# Patient Record
Sex: MALE | Race: WHITE | Employment: FULL TIME | URBAN - NONMETROPOLITAN AREA
[De-identification: names, ages, dates, MRNs, and addresses within clinical notes are randomized per-mention and may not be internally consistent; named-entity substitution may affect disease eponyms.]

---

## 2019-05-09 ENCOUNTER — OFFICE VISIT (OUTPATIENT)
Dept: FAMILY MEDICINE CLINIC | Age: 49
End: 2019-05-09

## 2019-05-09 VITALS
TEMPERATURE: 98.5 F | DIASTOLIC BLOOD PRESSURE: 100 MMHG | OXYGEN SATURATION: 95 % | SYSTOLIC BLOOD PRESSURE: 170 MMHG | HEIGHT: 71 IN | BODY MASS INDEX: 36.68 KG/M2 | WEIGHT: 262 LBS | HEART RATE: 105 BPM | RESPIRATION RATE: 16 BRPM

## 2019-05-09 DIAGNOSIS — L03.011 CELLULITIS OF RIGHT INDEX FINGER: ICD-10-CM

## 2019-05-09 DIAGNOSIS — T14.8XXA BLOOD BLISTER: Primary | ICD-10-CM

## 2019-05-09 PROCEDURE — 99212 OFFICE O/P EST SF 10 MIN: CPT | Performed by: NURSE PRACTITIONER

## 2019-05-09 RX ORDER — SULFAMETHOXAZOLE AND TRIMETHOPRIM 800; 160 MG/1; MG/1
1 TABLET ORAL 2 TIMES DAILY
Qty: 14 TABLET | Refills: 0 | Status: SHIPPED | OUTPATIENT
Start: 2019-05-09 | End: 2019-05-09 | Stop reason: CLARIF

## 2019-05-09 RX ORDER — CLINDAMYCIN HYDROCHLORIDE 300 MG/1
300 CAPSULE ORAL 3 TIMES DAILY
Qty: 30 CAPSULE | Refills: 0 | Status: SHIPPED | OUTPATIENT
Start: 2019-05-09 | End: 2019-05-19

## 2019-05-09 ASSESSMENT — PATIENT HEALTH QUESTIONNAIRE - PHQ9
SUM OF ALL RESPONSES TO PHQ QUESTIONS 1-9: 0
SUM OF ALL RESPONSES TO PHQ9 QUESTIONS 1 & 2: 0
1. LITTLE INTEREST OR PLEASURE IN DOING THINGS: 0
SUM OF ALL RESPONSES TO PHQ QUESTIONS 1-9: 0
2. FEELING DOWN, DEPRESSED OR HOPELESS: 0

## 2019-05-09 NOTE — PROGRESS NOTES
Subjective  Wilfred Weiss, 51 y.o. male presents today with:  Chief Complaint   Patient presents with    Finger Injury     infection on left pointer finger started off as an ingrown nail and oww has a bubble around finger . HPI   Patient is here index finger on right hand. It is full of fluid, pain and can feel heartbeat. Started off as an ingrown nail, got full of fluid and turning red over the past week. Patient has no systemic symptoms at this time. Has not done anything to treat finger. A little fluid came out, but it closed up again. Review of Systems   Constitutional: Negative for activity change, appetite change, chills, diaphoresis and fever. Objective    Vitals:    05/09/19 1427   BP: (!) 170/100   Site: Left Upper Arm   Position: Sitting   Cuff Size: Large Adult   Pulse: 105   Resp: 16   Temp: 98.5 °F (36.9 °C)   TempSrc: Tympanic   SpO2: 95%   Weight: 262 lb (118.8 kg)   Height: 5' 10.5\" (1.791 m)       Physical Exam   Constitutional: He appears well-developed and well-nourished. No distress. HENT:   Right Ear: External ear normal.   Left Ear: External ear normal.   Mouth/Throat: Oropharynx is clear and moist.   Eyes: Conjunctivae are normal. Right eye exhibits no discharge. Left eye exhibits no discharge. Cardiovascular: Normal rate, regular rhythm and normal heart sounds. Pulmonary/Chest: Effort normal and breath sounds normal.   Musculoskeletal: He exhibits no edema. Lymphadenopathy:     He has no cervical adenopathy. Skin: No rash noted. He is not diaphoretic. No erythema. No pallor. Right index finger from DIP joint to tip filled with dark brownish, malodorous fluid on palmar side. Erythema and warmth to dorsal side of hand. Vitals reviewed. POC Testing Today:No results found for this visit on 05/09/19. Assessment & Plan    Diagnosis Orders   1.  Blood blister  DISCONTINUED: sulfamethoxazole-trimethoprim (BACTRIM DS;SEPTRA DS) 800-160 MG per tablet   2. Cellulitis of right index finger  clindamycin (CLEOCIN) 300 MG capsule    DISCONTINUED: sulfamethoxazole-trimethoprim (BACTRIM DS;SEPTRA DS) 800-160 MG per tablet       No orders of the defined types were placed in this encounter. Finger cleansed with iodine, sprayed with pain ease , then puncture with sterile 18g needle. Pressure applied, large amount of malodorous drainage expressed approx 3cc. Nonadherent gauze applied and finger wrapped in Keflex. Recommended keeping finger open to air unless exposed to possible sources of infection. Patient verbalized understanding. Antibiotic Instructions:   Complete the full course of antibiotics as ordered. To prevent antibiotic resistances please take medication as ordered and for the full duration even if you start to feel better. Take each dose with a small snack or meal to lessen potential GI upset. Consider intake of yogurt or probiotic during antibiotic use and for a few days after to help reduce the risk of developing a secondary infection. Take the yogurt or probiotic at least 2 hours after taking the antibiotic. Avoid alcohol while taking antibiotics. Return if symptoms worsen or fail to improve, for follow up with your PCP. Side effects and adverse effects of any medication prescribed today, as well as treatment plan/rationale, follow-up care, and result expectations have been discussed with the patient. Expresses understanding and desires to proceed with treatment plan. Discussed signs and symptoms which require immediate follow-up in ED/call to 911. Understanding verbalized. I have reviewed and updated the electronic medical record.     Chikis Daniel, APRN - CNP

## 2021-05-11 LAB
ALBUMIN SERPL-MCNC: 4.2 G/DL
ALP BLD-CCNC: 67 U/L
ALT SERPL-CCNC: 23 U/L
ANION GAP SERPL CALCULATED.3IONS-SCNC: 13 MMOL/L
AST SERPL-CCNC: 14 U/L
AVERAGE GLUCOSE: NORMAL
BILIRUB SERPL-MCNC: 0.3 MG/DL (ref 0.1–1.4)
BUN BLDV-MCNC: 22 MG/DL
CALCIUM SERPL-MCNC: 23 MG/DL
CHLORIDE BLD-SCNC: 98 MMOL/L
CHOLESTEROL, TOTAL: 176 MG/DL
CHOLESTEROL/HDL RATIO: NORMAL
CO2: 24 MMOL/L
CREAT SERPL-MCNC: NORMAL MG/DL
GFR CALCULATED: >60
GLUCOSE BLD-MCNC: 318 MG/DL
HBA1C MFR BLD: 11.8 %
HDLC SERPL-MCNC: 42 MG/DL (ref 35–70)
LDL CHOLESTEROL CALCULATED: 98 MG/DL (ref 0–160)
NONHDLC SERPL-MCNC: NORMAL MG/DL
POTASSIUM SERPL-SCNC: 4.7 MMOL/L
SODIUM BLD-SCNC: 135 MMOL/L
TOTAL PROTEIN: 7.1
TRIGL SERPL-MCNC: 182 MG/DL
TSH SERPL DL<=0.05 MIU/L-ACNC: 1.34 UIU/ML
VLDLC SERPL CALC-MCNC: NORMAL MG/DL

## 2021-07-07 ENCOUNTER — OFFICE VISIT (OUTPATIENT)
Dept: FAMILY MEDICINE CLINIC | Age: 51
End: 2021-07-07

## 2021-07-07 VITALS
HEIGHT: 69 IN | WEIGHT: 242 LBS | HEART RATE: 98 BPM | BODY MASS INDEX: 35.84 KG/M2 | DIASTOLIC BLOOD PRESSURE: 80 MMHG | OXYGEN SATURATION: 98 % | SYSTOLIC BLOOD PRESSURE: 160 MMHG

## 2021-07-07 DIAGNOSIS — E11.42 TYPE 2 DIABETES MELLITUS WITH DIABETIC POLYNEUROPATHY, WITHOUT LONG-TERM CURRENT USE OF INSULIN (HCC): Primary | ICD-10-CM

## 2021-07-07 DIAGNOSIS — I10 ESSENTIAL HYPERTENSION: ICD-10-CM

## 2021-07-07 PROCEDURE — 99203 OFFICE O/P NEW LOW 30 MIN: CPT | Performed by: NURSE PRACTITIONER

## 2021-07-07 RX ORDER — PRAVASTATIN SODIUM 20 MG
20 TABLET ORAL DAILY
Qty: 30 TABLET | Refills: 5 | Status: SHIPPED | OUTPATIENT
Start: 2021-07-07 | End: 2021-08-02 | Stop reason: SDUPTHER

## 2021-07-07 RX ORDER — LISINOPRIL 10 MG/1
10 TABLET ORAL DAILY
Qty: 30 TABLET | Refills: 5 | Status: SHIPPED | OUTPATIENT
Start: 2021-07-07 | End: 2021-08-02 | Stop reason: SDUPTHER

## 2021-07-07 SDOH — ECONOMIC STABILITY: TRANSPORTATION INSECURITY
IN THE PAST 12 MONTHS, HAS THE LACK OF TRANSPORTATION KEPT YOU FROM MEDICAL APPOINTMENTS OR FROM GETTING MEDICATIONS?: NO

## 2021-07-07 SDOH — ECONOMIC STABILITY: FOOD INSECURITY: WITHIN THE PAST 12 MONTHS, YOU WORRIED THAT YOUR FOOD WOULD RUN OUT BEFORE YOU GOT MONEY TO BUY MORE.: NEVER TRUE

## 2021-07-07 SDOH — ECONOMIC STABILITY: TRANSPORTATION INSECURITY
IN THE PAST 12 MONTHS, HAS LACK OF TRANSPORTATION KEPT YOU FROM MEETINGS, WORK, OR FROM GETTING THINGS NEEDED FOR DAILY LIVING?: NO

## 2021-07-07 SDOH — ECONOMIC STABILITY: FOOD INSECURITY: WITHIN THE PAST 12 MONTHS, THE FOOD YOU BOUGHT JUST DIDN'T LAST AND YOU DIDN'T HAVE MONEY TO GET MORE.: NEVER TRUE

## 2021-07-07 ASSESSMENT — ENCOUNTER SYMPTOMS
SHORTNESS OF BREATH: 0
DIARRHEA: 0
COUGH: 0
CONSTIPATION: 0

## 2021-07-07 ASSESSMENT — PATIENT HEALTH QUESTIONNAIRE - PHQ9
1. LITTLE INTEREST OR PLEASURE IN DOING THINGS: 0
2. FEELING DOWN, DEPRESSED OR HOPELESS: 0
SUM OF ALL RESPONSES TO PHQ QUESTIONS 1-9: 0
SUM OF ALL RESPONSES TO PHQ9 QUESTIONS 1 & 2: 0

## 2021-07-07 ASSESSMENT — SOCIAL DETERMINANTS OF HEALTH (SDOH): HOW HARD IS IT FOR YOU TO PAY FOR THE VERY BASICS LIKE FOOD, HOUSING, MEDICAL CARE, AND HEATING?: NOT HARD AT ALL

## 2021-07-07 NOTE — PROGRESS NOTES
Subjective  Chief Complaint   Patient presents with   [facility] Maintenance     pt declined colonoscopy     Established New Doctor     pt states sugar has been running high x 3 months, has been exercising and dieting. HPI     Diabetes- has been trying to control with diet and exercise. Has lost some weight since watching diet. First diagnosis in 2015. Lost insurance. Was on medication for short period of time. Peripheral neuropathy present. No vision. No polydipsia, polyphagia. States that he is ready to \"get this under control\"    Bp has been running 140's-150's/80's. Denies any symptoms     Came in with [facility] blood work done in May. No other current complaints    There are no problems to display for this patient. Past Medical History:   Diagnosis Date    Hypertension     Type 2 diabetes mellitus ([address]      No past surgical history on file.   Family History   Problem Relation Age of Onset    Rheum Arthritis Mother     Osteoarthritis Mother     Hypertension Mother     Cancer Mother         uterine cancer    Other Father         myocardial infarction    Diabetes Father      Social History     Socioeconomic History    Marital status: Single     Spouse name: None    Number of children: None    Years of education: None    Highest education level: None   Occupational History    None   Tobacco Use    Smoking status: Never Smoker    Smokeless tobacco: Never Used   Substance and Sexual Activity    Alcohol use: No     Alcohol/week: 0.0 standard drinks    Drug use: No    Sexual activity: None   Other Topics Concern    None   Social History Narrative    None     Social Determinants of Health     Financial Resource Strain: Low Risk     Difficulty of Paying Living Expenses: Not hard at all   Food Insecurity: No Food Insecurity    Worried About Running Out of Food in the Last Year: Never true    Tai of Food in the Last Year: Never true   Transportation Needs: No regular rhythm. Pulses: Normal pulses. Heart sounds: Normal heart sounds. Pulmonary:      Effort: Pulmonary effort is normal.      Breath sounds: Normal breath sounds. Skin:     General: Skin is warm. Neurological:      General: No focal deficit present. Mental Status: He is alert and oriented to person, place, and time. Mental status is at baseline. Psychiatric:         Mood and Affect: Mood normal.         Behavior: Behavior normal.         Thought Content: Thought content normal.         Judgment: Judgment normal.         Assessment & Plan     Diagnosis Orders   1. Type 2 diabetes mellitus with diabetic polyneuropathy, without long-term current use of insulin (HCC)  metFORMIN (GLUCOPHAGE) 500 MG tablet    pravastatin (PRAVACHOL) 20 MG tablet    lisinopril (PRINIVIL;ZESTRIL) 10 MG tablet   2. Essential hypertension  lisinopril (PRINIVIL;ZESTRIL) 10 MG tablet       No orders of the defined types were placed in this encounter. Orders Placed This Encounter   Medications    metFORMIN (GLUCOPHAGE) 500 MG tablet     Sig: Take 2 tablets by mouth 2 times daily (with meals)     Dispense:  120 tablet     Refill:  5    pravastatin (PRAVACHOL) 20 MG tablet     Sig: Take 1 tablet by mouth daily     Dispense:  30 tablet     Refill:  5    lisinopril (PRINIVIL;ZESTRIL) 10 MG tablet     Sig: Take 1 tablet by mouth daily     Dispense:  30 tablet     Refill:  5     Side effects, adverse effects of the medication prescribed today, as well as treatment plan/ rationale and result expectations have been discussed with the patient who expresses understanding and desires to proceed. Close follow up to evaluate treatment results and for coordination of care. I have reviewed the patient's medical history in detail and updated the computerized patient record. As always, patient is advised that if symptoms worsen in any way they will proceed to the nearest emergency room.      Diabetes education provided today:    Diabetic Neuropathy: signs and therapy. Diabetic retinopathy: the most frequent cause of blindness in [place] currently. Measures to prevent that. Diabetic nephropathy: Kidney function, microalbumin as a sign of diabetic nephropathy. Stages of kidney disease. Different diabetic medications. Return in about 4 weeks (around 8/4/2021).     SALTY Mann - CNP

## 2021-08-02 ENCOUNTER — OFFICE VISIT (OUTPATIENT)
Dept: FAMILY MEDICINE CLINIC | Age: 51
End: 2021-08-02

## 2021-08-02 VITALS
DIASTOLIC BLOOD PRESSURE: 70 MMHG | BODY MASS INDEX: 33.08 KG/M2 | SYSTOLIC BLOOD PRESSURE: 140 MMHG | HEART RATE: 94 BPM | WEIGHT: 224 LBS | OXYGEN SATURATION: 97 % | TEMPERATURE: 99 F

## 2021-08-02 DIAGNOSIS — J06.9 VIRAL URI: ICD-10-CM

## 2021-08-02 DIAGNOSIS — I10 ESSENTIAL HYPERTENSION: ICD-10-CM

## 2021-08-02 DIAGNOSIS — J06.9 VIRAL URI: Primary | ICD-10-CM

## 2021-08-02 DIAGNOSIS — E11.42 TYPE 2 DIABETES MELLITUS WITH DIABETIC POLYNEUROPATHY, WITHOUT LONG-TERM CURRENT USE OF INSULIN (HCC): ICD-10-CM

## 2021-08-02 PROCEDURE — 99213 OFFICE O/P EST LOW 20 MIN: CPT

## 2021-08-02 RX ORDER — LISINOPRIL 10 MG/1
10 TABLET ORAL DAILY
Qty: 90 TABLET | Refills: 1 | Status: SHIPPED | OUTPATIENT
Start: 2021-08-02 | End: 2021-08-18

## 2021-08-02 RX ORDER — BENZONATATE 100 MG/1
100-200 CAPSULE ORAL 3 TIMES DAILY PRN
Qty: 42 CAPSULE | Refills: 0 | Status: SHIPPED | OUTPATIENT
Start: 2021-08-02 | End: 2021-08-09

## 2021-08-02 RX ORDER — PRAVASTATIN SODIUM 20 MG
20 TABLET ORAL DAILY
Qty: 90 TABLET | Refills: 1 | Status: ON HOLD | OUTPATIENT
Start: 2021-08-02 | End: 2022-01-05 | Stop reason: HOSPADM

## 2021-08-02 RX ORDER — FLUTICASONE PROPIONATE 50 MCG
2 SPRAY, SUSPENSION (ML) NASAL DAILY
Qty: 1 BOTTLE | Refills: 0 | Status: SHIPPED | OUTPATIENT
Start: 2021-08-02

## 2021-08-02 RX ORDER — LORATADINE 10 MG
2 CAPSULE ORAL 4 TIMES DAILY
Qty: 168 CAPSULE | Refills: 0 | Status: SHIPPED | OUTPATIENT
Start: 2021-08-02 | End: 2021-12-30

## 2021-08-02 ASSESSMENT — ENCOUNTER SYMPTOMS
EYE DISCHARGE: 0
EYE REDNESS: 0
COLOR CHANGE: 0
CHEST TIGHTNESS: 1
COUGH: 1
BACK PAIN: 0
ABDOMINAL PAIN: 0
EYE ITCHING: 0
SHORTNESS OF BREATH: 0
SINUS PAIN: 0
EYE PAIN: 0
FLU SYMPTOMS: 1
NAUSEA: 0
SINUS PRESSURE: 0
SORE THROAT: 0
SWOLLEN GLANDS: 0
RHINORRHEA: 1

## 2021-08-02 NOTE — PATIENT INSTRUCTIONS
Patient Education        Viral Respiratory Infection: Care Instructions  Your Care Instructions     Viruses are very small organisms. They grow in number after they enter your body. There are many types that cause different illnesses, such as colds and the mumps. The symptoms of a viral respiratory infection often start quickly. They include a fever, sore throat, and runny nose. You may also just not feel well. Or you may not want to eat much. Most viral respiratory infections are not serious. They usually get better with time and self-care. Antibiotics are not used to treat a viral infection. That's because antibiotics will not help cure a viral illness. In some cases, antiviral medicine can help your body fight a serious viral infection. Follow-up care is a key part of your treatment and safety. Be sure to make and go to all appointments, and call your doctor if you are having problems. It's also a good idea to know your test results and keep a list of the medicines you take. How can you care for yourself at home? · Rest as much as possible until you feel better. · Be safe with medicines. Take your medicine exactly as prescribed. Call your doctor if you think you are having a problem with your medicine. You will get more details on the specific medicine your doctor prescribes. · Take an over-the-counter pain medicine, such as acetaminophen (Tylenol), ibuprofen (Advil, Motrin), or naproxen (Aleve), as needed for pain and fever. Read and follow all instructions on the label. Do not give aspirin to anyone younger than 20. It has been linked to Reye syndrome, a serious illness. · Drink plenty of fluids. Hot fluids, such as tea or soup, may help relieve congestion in your nose and throat. If you have kidney, heart, or liver disease and have to limit fluids, talk with your doctor before you increase the amount of fluids you drink. · Try to clear mucus from your lungs by breathing deeply and coughing.   · Gargle with warm salt water once an hour. This can help reduce swelling and throat pain. Use 1 teaspoon of salt mixed in 1 cup of warm water. · Do not smoke or allow others to smoke around you. If you need help quitting, talk to your doctor about stop-smoking programs and medicines. These can increase your chances of quitting for good. To avoid spreading the virus  · Cough or sneeze into a tissue. Then throw the tissue away. · If you don't have a tissue, use your hand to cover your cough or sneeze. Then clean your hand. You can also cough into your sleeve. · Wash your hands often. Use soap and warm water. Wash for 15 to 20 seconds each time. · If you don't have soap and water near you, you can clean your hands with alcohol wipes or gel. When should you call for help? Call your doctor now or seek immediate medical care if:    · You have a new or higher fever.     · Your fever lasts more than 48 hours.     · You have trouble breathing.     · You have a fever with a stiff neck or a severe headache.     · You are sensitive to light.     · You feel very sleepy or confused. Watch closely for changes in your health, and be sure to contact your doctor if:    · You do not get better as expected. Where can you learn more? Go to [email or web address] and sign in to your [employer] account. Enter I661 in the [facility] box to learn more about \"Viral Respiratory Infection: Care Instructions. \"     If you do not have an account, please click on the \"Sign Up Now\" link. Current as of: October 26, 2020               Content Version: 12.9  © 5139-0138 [employer]. Care instructions adapted under license by [facility] ([facility]). If you have questions about a medical condition or this instruction, always ask your healthcare professional. [place] [address] any warranty or liability for your use of this information.

## 2021-08-02 NOTE — TELEPHONE ENCOUNTER
Pt calling for refills will be using [facility] no ins      Metformin and lisinopril are for 180 day  Pravastatin 90 day

## 2021-08-02 NOTE — PROGRESS NOTES
[facility] Encounter  CHIEF COMPLAINT       Chief Complaint   Patient presents with    Influenza     chills, some fever, no taste, smell seems to be ok    Nasal Congestion     since last wed    Chest Congestion     non productive cough       HISTORY OF PRESENT ILLNESS   Edna Oliveira is a 45 y.o. male who presents with: Influenza  This is a new problem. Episode onset: Starting wednesday. The problem occurs constantly. The problem has been gradually improving. Associated symptoms include arthralgias, chills, congestion, coughing, fatigue, a fever, headaches and myalgias. Pertinent negatives include no abdominal pain, anorexia, chest pain, diaphoresis, joint swelling, nausea, numbness, rash, sore throat, swollen glands or weakness. Nothing aggravates the symptoms. Treatments tried: mucinex. The treatment provided mild relief. REVIEW OF SYSTEMS     Review of Systems   Constitutional: Positive for chills, fatigue and fever. Negative for diaphoresis. HENT: Positive for congestion and rhinorrhea. Negative for ear discharge, ear pain, postnasal drip, sinus pressure, sinus pain and sore throat. Eyes: Negative for pain, discharge, redness and itching. Respiratory: Positive for cough and chest tightness. Negative for shortness of breath. Cardiovascular: Negative for chest pain and leg swelling. Gastrointestinal: Negative for abdominal pain, anorexia and nausea. Endocrine: Negative for polydipsia, polyphagia and polyuria. Genitourinary: Negative for difficulty urinating, enuresis and flank pain. Musculoskeletal: Positive for arthralgias and myalgias. Negative for back pain, gait problem, joint swelling and neck stiffness. Skin: Negative for color change and rash. Neurological: Positive for light-headedness and headaches. Negative for dizziness, seizures, speech difficulty, weakness and numbness. Psychiatric/Behavioral: Negative.   Negative for agitation. PAST MEDICAL HISTORY         Diagnosis Date    Hypertension     Type 2 diabetes mellitus ([facility] [address]      SURGICAL HISTORY     Patient  has no past surgical history on file. CURRENT MEDICATIONS       Previous Medications    No medications on file     ALLERGIES     Patient is is allergic to penicillins. FAMILY HISTORY     Patient'sfamily history includes Cancer in his mother; Diabetes in his father; Hypertension in his mother; Osteoarthritis in his mother; Other in his father; Rheum Arthritis in his mother. HISTORY     Patient  reports that he has never smoked. He has never used smokeless tobacco. He reports that he does not drink alcohol and does not use drugs. PHYSICAL EXAM     VITALS  BP: (!) 140/70, Temp: 99 °F (37.2 °C), Pulse: 94,  , SpO2: 97 %  Physical Exam  Constitutional:       General: He is not in acute distress. Appearance: Normal appearance. He is ill-appearing. He is not toxic-appearing. HENT:      Head: Normocephalic. Right Ear: Tympanic membrane, ear canal and external ear normal. There is no impacted cerumen. Left Ear: Tympanic membrane, ear canal and external ear normal. There is no impacted cerumen. Nose: Rhinorrhea present. No congestion. Right Turbinates: Not swollen. Left Turbinates: Not swollen. Mouth/Throat:      Mouth: Mucous membranes are moist.      Pharynx: Posterior oropharyngeal erythema present. Tonsils: No tonsillar exudate or tonsillar abscesses. 1+ on the right. 1+ on the left. Eyes:      General:         Right eye: No discharge. Left eye: No discharge. Conjunctiva/sclera: Conjunctivae normal.   Cardiovascular:      Rate and Rhythm: Normal rate and regular rhythm. Pulses: Normal pulses. Heart sounds: Normal heart sounds. No murmur heard. No gallop. Pulmonary:      Effort: Pulmonary effort is normal. No respiratory distress. Breath sounds: Normal breath sounds. No stridor.  No wheezing, rhonchi or rales. Chest:      Chest wall: No tenderness. Abdominal:      General: Abdomen is flat. Palpations: Abdomen is soft. Musculoskeletal:         General: Normal range of motion. Cervical back: Normal range of motion and neck supple. No rigidity or tenderness. Lymphadenopathy:      Cervical: No cervical adenopathy. Skin:     General: Skin is warm and dry. Capillary Refill: Capillary refill takes less than 2 seconds. Coloration: Skin is not pale. Neurological:      Mental Status: He is alert and oriented to person, place, and time. Mental status is at baseline. Psychiatric:         Mood and Affect: Mood normal.       READY CARE COURSE     Orders Placed This Encounter   Procedures    COVID-19     Standing Status:   Future     Standing Expiration Date:   8/2/2022     Scheduling Instructions:      1) Due to current limited availability of the COVID-19 test, tests will be prioritized based on responses to questions above. Testing may be delayed due to volume. 2) Print and instruct patient to adhere to CDC home isolation program. (Link Above)              3) Set up or refer patient for a monitoring program.              4) Have patient sign up for and leverage [employer] (if not previously done). Order Specific Question:   Is this test for diagnosis or screening? Answer:   Diagnosis of ill patient     Order Specific Question:   Symptomatic for COVID-19 as defined by CDC? Answer:   Yes     Order Specific Question:   Date of Symptom Onset     Answer:   7/28/2021     Order Specific Question:   Hospitalized for COVID-19? Answer:   No     Order Specific Question:   Admitted to ICU for COVID-19? Answer:   No     Order Specific Question:   Employed in healthcare setting? Answer:   No     Order Specific Question:   Resident in a congregate (group) care setting? Answer:   No     Order Specific Question:   Pregnant:      Answer:   No     Order Specific Question: Previously tested for COVID-19? Answer:   No        Labs:  No results found for this visit on 08/02/21. IMAGING:  No orders to display     Scheduled Meds:  Continuous Infusions:  PRN Meds:. PROCEDURES:  FINAL IMPRESSION      1. Viral URI        DISPOSITION/PLAN   46 YO male reporting congestion cough starting last Wednesday. Patient has not had COVID vaccination and was out of town last week. Physical exam significant for rhinorrhea enlarged nasal turbinates erythema to pharynx without tonsillar enlargement or exudate. NO cervical adenopathy LS clear. COVID test performed. Pt will be discharged with symptom relief. Coricidin HBP cough and cold and Tessalon capsules. Patient started on Flonase nasal spray daily. Educated to return if symptoms are not improving in 2-4 days or if they worsen. Advised to monitor for respiratory symptoms such as chest pain, SOB or fever that can not be controled. PATIENT REFERRED TO:  Return if symptoms worsen or fail to improve, for Follow up with PCP. DISCHARGE MEDICATIONS:  New Prescriptions    BENZONATATE (TESSALON) 100 MG CAPSULE    Take 1-2 capsules by mouth 3 times daily as needed for Cough    DEXTROMETHORPHAN-GUAIFENESIN (CORICIDIN HBP CONGESTION/COUGH)  MG CAPS    Take 2 tablets by mouth 4 times daily    FLUTICASONE (FLONASE) 50 MCG/ACT NASAL SPRAY    2 sprays by Each Nostril route daily     Cannot display discharge medications since this is not an admission.        Brandon Fuentes, APRN - CNP

## 2021-08-03 LAB — SARS-COV-2, PCR: DETECTED

## 2021-08-18 ENCOUNTER — OFFICE VISIT (OUTPATIENT)
Dept: FAMILY MEDICINE CLINIC | Age: 51
End: 2021-08-18

## 2021-08-18 VITALS
HEART RATE: 96 BPM | DIASTOLIC BLOOD PRESSURE: 86 MMHG | SYSTOLIC BLOOD PRESSURE: 146 MMHG | BODY MASS INDEX: 34.66 KG/M2 | HEIGHT: 69 IN | WEIGHT: 234 LBS | OXYGEN SATURATION: 96 %

## 2021-08-18 DIAGNOSIS — I10 ESSENTIAL HYPERTENSION: Primary | ICD-10-CM

## 2021-08-18 DIAGNOSIS — E11.42 TYPE 2 DIABETES MELLITUS WITH DIABETIC POLYNEUROPATHY, WITHOUT LONG-TERM CURRENT USE OF INSULIN (HCC): ICD-10-CM

## 2021-08-18 PROCEDURE — 99213 OFFICE O/P EST LOW 20 MIN: CPT | Performed by: NURSE PRACTITIONER

## 2021-08-18 RX ORDER — LISINOPRIL 20 MG/1
20 TABLET ORAL DAILY
Qty: 30 TABLET | Refills: 5 | Status: SHIPPED | OUTPATIENT
Start: 2021-08-18 | End: 2021-12-20 | Stop reason: DRUGHIGH

## 2021-08-18 ASSESSMENT — ENCOUNTER SYMPTOMS
DIARRHEA: 0
COUGH: 0
CONSTIPATION: 0
SHORTNESS OF BREATH: 0

## 2021-08-18 NOTE — PROGRESS NOTES
Subjective  Chief Complaint   Patient presents with   [facility] Maintenance     pt declined colonoscopy     Follow-up     diabetes visit. HPI    Had covid since I saw him last. He notes that he was quite sick. HTN- 140's/80's. Taking lisionpril daily. Tolerating it ok. No symptoms of high BP    DM- Has not been able to check his BS on his own. Has noticed that his neuropathy has improved significantly since starting his medications. Otherwise, no current concerns or questions. Overall he is feeling well    There are no problems to display for this patient. Past Medical History:   Diagnosis Date    Hypertension     Type 2 diabetes mellitus ([address]      No past surgical history on file. Family History   Problem Relation Age of Onset    Rheum Arthritis Mother     Osteoarthritis Mother     Hypertension Mother     Cancer Mother         uterine cancer    Other Father         myocardial infarction    Diabetes Father      Social History     Socioeconomic History    Marital status: Single     Spouse name: None    Number of children: None    Years of education: None    Highest education level: None   Occupational History    None   Tobacco Use    Smoking status: Never Smoker    Smokeless tobacco: Never Used   Substance and Sexual Activity    Alcohol use: No     Alcohol/week: 0.0 standard drinks    Drug use: No    Sexual activity: None   Other Topics Concern    None   Social History Narrative    None     Social Determinants of Health     Financial Resource Strain: Low Risk     Difficulty of Paying Living Expenses: Not hard at all   Food Insecurity: No Food Insecurity    Worried About Running Out of Food in the Last Year: Never true    Tai of Food in the Last Year: Never true   Transportation Needs: No Transportation Needs    Lack of Transportation (Medical): No    Lack of Transportation (Non-Medical):  No   Physical Activity:     Days of Exercise per Week:     Minutes of Exercise per Session:    Stress:     Feeling of Stress :    Social Connections:     Frequency of Communication with Friends and Family:     Frequency of Social Gatherings with Friends and Family:     Attends Islam Services:     Active Member of Clubs or Organizations:     Attends Club or Organization Meetings:     Marital Status:    Intimate Partner Violence:     Fear of Current or Ex-Partner:     Emotionally Abused:     Physically Abused:     Sexually Abused:      Current Outpatient Medications on File Prior to Visit   Medication Sig Dispense Refill    metFORMIN (GLUCOPHAGE) 500 MG tablet Take 2 tablets by mouth 2 times daily (with meals) 360 tablet 1    pravastatin (PRAVACHOL) 20 MG tablet Take 1 tablet by mouth daily 90 tablet 1    Dextromethorphan-guaiFENesin (CORICIDIN HBP CONGESTION/COUGH)  MG CAPS Take 2 tablets by mouth 4 times daily 168 capsule 0    fluticasone (FLONASE) 50 MCG/ACT nasal spray 2 sprays by Each Nostril route daily 1 Bottle 0     No current facility-administered medications on file prior to visit. Allergies   Allergen Reactions    Penicillins Hives and Swelling       Review of Systems   Constitutional: Negative for fatigue. Respiratory: Negative for cough and shortness of breath. Cardiovascular: Negative for chest pain. Gastrointestinal: Negative for constipation and diarrhea. Objective  Vitals:    08/18/21 1251 08/18/21 1254   BP: (!) 152/84 (!) 146/86   Site: Right Upper Arm Left Upper Arm   Position: Sitting Sitting   Cuff Size: Large Adult Large Adult   Pulse: 96    SpO2: 96%    Weight: 234 lb (106.1 kg)    Height: 5' 9\" (1.753 m)      Physical Exam  Vitals and nursing note reviewed. Constitutional:       Appearance: Normal appearance. He is normal weight. HENT:      Head: Normocephalic. Nose: Nose normal.      Mouth/Throat:      Mouth: Mucous membranes are moist.   Eyes:      Extraocular Movements: Extraocular movements intact. Conjunctiva/sclera: Conjunctivae normal.      Pupils: Pupils are equal, round, and reactive to light. Cardiovascular:      Rate and Rhythm: Normal rate and regular rhythm. Pulses: Normal pulses. Heart sounds: Normal heart sounds. Pulmonary:      Effort: Pulmonary effort is normal.      Breath sounds: Normal breath sounds. Skin:     General: Skin is warm. Neurological:      General: No focal deficit present. Mental Status: He is alert and oriented to person, place, and time. Mental status is at baseline. Psychiatric:         Mood and Affect: Mood normal.         Behavior: Behavior normal.         Thought Content: Thought content normal.         Judgment: Judgment normal.       Assessment & Plan     Diagnosis Orders   1. Essential hypertension  lisinopril (PRINIVIL;ZESTRIL) 20 MG tablet   2. Type 2 diabetes mellitus with diabetic polyneuropathy, without long-term current use of insulin (HCC)  Continue on current metformin dose. Try to check BS on his own. No orders of the defined types were placed in this encounter. Orders Placed This Encounter   Medications    lisinopril (PRINIVIL;ZESTRIL) 20 MG tablet     Sig: Take 1 tablet by mouth daily     Dispense:  30 tablet     Refill:  5     Side effects, adverse effects of the medication prescribed today, as well as treatment plan/ rationale and result expectations have been discussed with the patient who expresses understanding and desires to proceed. Close follow up to evaluate treatment results and for coordination of care. I have reviewed the patient's medical history in detail and updated the computerized patient record. As always, patient is advised that if symptoms worsen in any way they will proceed to the nearest emergency room. Fu in October for next A1c.     SALTY Dueñas - ROJELIO

## 2021-10-20 ENCOUNTER — OFFICE VISIT (OUTPATIENT)
Dept: FAMILY MEDICINE CLINIC | Age: 51
End: 2021-10-20

## 2021-10-20 VITALS
DIASTOLIC BLOOD PRESSURE: 88 MMHG | SYSTOLIC BLOOD PRESSURE: 138 MMHG | BODY MASS INDEX: 36.29 KG/M2 | HEIGHT: 69 IN | OXYGEN SATURATION: 97 % | HEART RATE: 88 BPM | WEIGHT: 245 LBS

## 2021-10-20 DIAGNOSIS — E11.42 TYPE 2 DIABETES MELLITUS WITH DIABETIC POLYNEUROPATHY, WITHOUT LONG-TERM CURRENT USE OF INSULIN (HCC): Primary | ICD-10-CM

## 2021-10-20 LAB — HBA1C MFR BLD: 10.1 %

## 2021-10-20 PROCEDURE — 99213 OFFICE O/P EST LOW 20 MIN: CPT | Performed by: NURSE PRACTITIONER

## 2021-10-20 PROCEDURE — 83036 HEMOGLOBIN GLYCOSYLATED A1C: CPT | Performed by: NURSE PRACTITIONER

## 2021-10-20 RX ORDER — GLIPIZIDE 5 MG/1
5 TABLET ORAL 2 TIMES DAILY
Qty: 60 TABLET | Refills: 5 | Status: SHIPPED | OUTPATIENT
Start: 2021-10-20 | End: 2021-12-20

## 2021-10-20 ASSESSMENT — ENCOUNTER SYMPTOMS
COUGH: 0
SHORTNESS OF BREATH: 0

## 2021-10-20 NOTE — PROGRESS NOTES
Subjective  Chief Complaint   Patient presents with    Follow-up     2 MONTH F/U    Health Maintenance     PT DECLINED FLU VACCINE, COLON SCREENING. HPI    Here for diabetes follow up. We had increased the metformin at the last visit. Blood sugar has been trending down. Foot tingling has improved some. Still running consistently in the 250's. No other issues/concerns currently. There are no problems to display for this patient. Past Medical History:   Diagnosis Date    Hypertension     Type 2 diabetes mellitus ([address]      No past surgical history on file. Family History   Problem Relation Age of Onset    Rheum Arthritis Mother     Osteoarthritis Mother     Hypertension Mother     Cancer Mother         uterine cancer    Other Father         myocardial infarction    Diabetes Father      Social History     Socioeconomic History    Marital status: Single     Spouse name: None    Number of children: None    Years of education: None    Highest education level: None   Occupational History    None   Tobacco Use    Smoking status: Never Smoker    Smokeless tobacco: Never Used   Substance and Sexual Activity    Alcohol use: No     Alcohol/week: 0.0 standard drinks    Drug use: No    Sexual activity: None   Other Topics Concern    None   Social History Narrative    None     Social Determinants of Health     Financial Resource Strain: Low Risk     Difficulty of Paying Living Expenses: Not hard at all   Food Insecurity: No Food Insecurity    Worried About Running Out of Food in the Last Year: Never true    Tai of Food in the Last Year: Never true   Transportation Needs: No Transportation Needs    Lack of Transportation (Medical): No    Lack of Transportation (Non-Medical):  No   Physical Activity:     Days of Exercise per Week:     Minutes of Exercise per Session:    Stress:     Feeling of Stress :    Social Connections:     Frequency of Communication with Friends and Family:     Frequency of Social Gatherings with Friends and Family:     Attends [employer] Services:     Active Member of Clubs or Organizations:     Attends Club or Organization Meetings:     Marital Status:    Intimate Partner Violence:     Fear of Current or Ex-Partner:     Emotionally Abused:     Physically Abused:     Sexually Abused:      Current Outpatient Medications on File Prior to Visit   Medication Sig Dispense Refill    lisinopril (PRINIVIL;ZESTRIL) 20 MG tablet Take 1 tablet by mouth daily 30 tablet 5    metFORMIN (GLUCOPHAGE) 500 MG tablet Take 2 tablets by mouth 2 times daily (with meals) 360 tablet 1    pravastatin (PRAVACHOL) 20 MG tablet Take 1 tablet by mouth daily 90 tablet 1    fluticasone (FLONASE) 50 MCG/ACT nasal spray 2 sprays by Each Nostril route daily 1 Bottle 0    Dextromethorphan-guaiFENesin (CORICIDIN HBP CONGESTION/COUGH)  MG CAPS Take 2 tablets by mouth 4 times daily (Patient not taking: Reported on 10/20/2021) 168 capsule 0     No current facility-administered medications on file prior to visit. Allergies   Allergen Reactions    Penicillins Hives and Swelling       Review of Systems   Constitutional: Negative for fatigue. Respiratory: Negative for cough and shortness of breath. Cardiovascular: Negative for chest pain. Objective  Vitals:    10/20/21 1249   BP: 138/88   Pulse: 88   SpO2: 97%   Weight: 245 lb (111.1 kg)   Height: 5' 9\" (1.753 m)     Physical Exam  Vitals and nursing note reviewed. Constitutional:       Appearance: Normal appearance. HENT:      Head: Normocephalic. Nose: Nose normal.      Mouth/Throat:      Mouth: Mucous membranes are moist.      Pharynx: Oropharynx is clear. Cardiovascular:      Rate and Rhythm: Normal rate and regular rhythm. Pulses: Normal pulses. Heart sounds: Normal heart sounds. Pulmonary:      Effort: Pulmonary effort is normal.      Breath sounds: Normal breath sounds.    Skin: General: Skin is warm. Neurological:      General: No focal deficit present. Mental Status: He is alert and oriented to person, place, and time. Mental status is at baseline. Psychiatric:         Mood and Affect: Mood normal.         Behavior: Behavior normal.         Thought Content: Thought content normal.         Judgment: Judgment normal.         Assessment & Plan     Diagnosis Orders   1. Type 2 diabetes mellitus with diabetic polyneuropathy, without long-term current use of insulin (HCC)  Hemoglobin A1C    glipiZIDE (GLUCOTROL) 5 MG tablet    POCT glycosylated hemoglobin (Hb A1C)       Orders Placed This Encounter   Procedures    Hemoglobin A1C     Standing Status:   Future     Standing Expiration Date:   10/20/2022    POCT glycosylated hemoglobin (Hb A1C)       Orders Placed This Encounter   Medications    glipiZIDE (GLUCOTROL) 5 MG tablet     Sig: Take 1 tablet by mouth 2 times daily     Dispense:  60 tablet     Refill:  5     Side effects, adverse effects of the medication prescribed today, as well as treatment plan/ rationale and result expectations have been discussed with the patient who expresses understanding and desires to proceed. Close follow up to evaluate treatment results and for coordination of care. I have reviewed the patient's medical history in detail and updated the computerized patient record. As always, patient is advised that if symptoms worsen in any way they will proceed to the nearest emergency room. Return in about 2 months (around 12/20/2021).     SALTY Ozuna - CNP

## 2021-12-20 ENCOUNTER — OFFICE VISIT (OUTPATIENT)
Dept: FAMILY MEDICINE CLINIC | Age: 51
End: 2021-12-20

## 2021-12-20 VITALS
TEMPERATURE: 97.7 F | HEART RATE: 92 BPM | SYSTOLIC BLOOD PRESSURE: 138 MMHG | OXYGEN SATURATION: 95 % | DIASTOLIC BLOOD PRESSURE: 82 MMHG

## 2021-12-20 DIAGNOSIS — E11.42 TYPE 2 DIABETES MELLITUS WITH DIABETIC POLYNEUROPATHY, WITHOUT LONG-TERM CURRENT USE OF INSULIN (HCC): Primary | ICD-10-CM

## 2021-12-20 DIAGNOSIS — I10 ESSENTIAL HYPERTENSION: ICD-10-CM

## 2021-12-20 LAB — HBA1C MFR BLD: 9.1 %

## 2021-12-20 PROCEDURE — 99213 OFFICE O/P EST LOW 20 MIN: CPT | Performed by: NURSE PRACTITIONER

## 2021-12-20 PROCEDURE — 83036 HEMOGLOBIN GLYCOSYLATED A1C: CPT | Performed by: NURSE PRACTITIONER

## 2021-12-20 RX ORDER — GLIPIZIDE 10 MG/1
10 TABLET ORAL 2 TIMES DAILY
Qty: 180 TABLET | Refills: 1 | Status: SHIPPED | OUTPATIENT
Start: 2021-12-20 | End: 2021-12-30 | Stop reason: SDUPTHER

## 2021-12-20 RX ORDER — LISINOPRIL 10 MG/1
20 TABLET ORAL DAILY
COMMUNITY
Start: 2021-11-29 | End: 2021-12-30

## 2021-12-20 ASSESSMENT — ENCOUNTER SYMPTOMS
CONSTIPATION: 0
COUGH: 0
SHORTNESS OF BREATH: 0
DIARRHEA: 0

## 2021-12-20 NOTE — PROGRESS NOTES
Subjective  Chief Complaint   Patient presents with    Follow-up     2 month f/u    [facility] Maintenance     pt declined flu, colon screening        Diabetes  He presents for his follow-up diabetic visit. He has type 2 diabetes mellitus. His disease course has been improving. There are no hypoglycemic associated symptoms. Associated symptoms include foot paresthesias. Pertinent negatives for diabetes include no chest pain and no fatigue. There are no hypoglycemic complications. Symptoms are stable. Diabetic complications include peripheral neuropathy. Risk factors for coronary artery disease include male sex. Current diabetic treatment includes oral agent (dual therapy). He is compliant with treatment all of the time. There is no change in his home blood glucose trend. Lab Results   Component Value Date    CHOL 176 05/11/2021    TRIG 182 05/11/2021    HDL 42 05/11/2021    ALT 23 05/11/2021    AST 14 05/11/2021     05/11/2021    K 4.7 05/11/2021    CL 98 05/11/2021    CREATININE .0.66 05/11/2021    BUN 22 05/11/2021    CO2 24 05/11/2021    TSH 1.340 05/11/2021    LABA1C 9.1 12/20/2021         There are no problems to display for this patient. Past Medical History:   Diagnosis Date    Hypertension     Type 2 diabetes mellitus ([address]      No past surgical history on file.   Family History   Problem Relation Age of Onset    Rheum Arthritis Mother     Osteoarthritis Mother     Hypertension Mother     Cancer Mother         uterine cancer    Other Father         myocardial infarction    Diabetes Father      Social History     Socioeconomic History    Marital status: Single     Spouse name: Not on file    Number of children: Not on file    Years of education: Not on file    Highest education level: Not on file   Occupational History    Not on file   Tobacco Use    Smoking status: Never Smoker    Smokeless tobacco: Never Used   Substance and Sexual Activity    Alcohol use: No     Alcohol/week: 0.0 standard drinks    Drug use: No    Sexual activity: Not on file   Other Topics Concern    Not on file   Social History Narrative    Not on file     Social Determinants of Health     Financial Resource Strain: Low Risk     Difficulty of Paying Living Expenses: Not hard at all   Food Insecurity: No Food Insecurity    Worried About Running Out of Food in the Last Year: Never true    Tai of Food in the Last Year: Never true   Transportation Needs: No Transportation Needs    Lack of Transportation (Medical): No    Lack of Transportation (Non-Medical):  No   Physical Activity:     Days of Exercise per Week: Not on file    Minutes of Exercise per Session: Not on file   Stress:     Feeling of Stress : Not on file   Social Connections:     Frequency of Communication with Friends and Family: Not on file    Frequency of Social Gatherings with Friends and Family: Not on file    Attends Yarsani Services: Not on file    Active Member of [address] [place] [address] or Organizations: Not on file    Attends Club or Organization Meetings: Not on file    Marital Status: Not on file   Intimate Partner Violence:     Fear of Current or Ex-Partner: Not on file    Emotionally Abused: Not on file    Physically Abused: Not on file    Sexually Abused: Not on file   Housing Stability:     Unable to Pay for Housing in the Last Year: Not on file    Number of [place] in the Last Year: Not on file    Unstable Housing in the Last Year: Not on file     Current Outpatient Medications on File Prior to Visit   Medication Sig Dispense Refill    lisinopril (PRINIVIL;ZESTRIL) 10 MG tablet TAKE ONE TABLET BY MOUTH DAILY      metFORMIN (GLUCOPHAGE) 500 MG tablet Take 2 tablets by mouth 2 times daily (with meals) 360 tablet 1    pravastatin (PRAVACHOL) 20 MG tablet Take 1 tablet by mouth daily 90 tablet 1    fluticasone (FLONASE) 50 MCG/ACT nasal spray 2 sprays by Each Nostril route daily 1 Bottle 0    Dextromethorphan-guaiFENesin (CORICIDIN HBP CONGESTION/COUGH)  MG CAPS Take 2 tablets by mouth 4 times daily (Patient not taking: Reported on 10/20/2021) 168 capsule 0     No current facility-administered medications on file prior to visit. Allergies   Allergen Reactions    Penicillins Hives and Swelling       Review of Systems   Constitutional: Negative for fatigue. Respiratory: Negative for cough and shortness of breath. Cardiovascular: Negative for chest pain. Gastrointestinal: Negative for constipation and diarrhea. Objective  Vitals:    12/20/21 1343   BP: 138/82   Pulse: 92   Temp: 97.7 °F (36.5 °C)   SpO2: 95%     Physical Exam  Vitals and nursing note reviewed. Constitutional:       Appearance: Normal appearance. HENT:      Head: Normocephalic. Nose: Nose normal.      Mouth/Throat:      Mouth: Mucous membranes are moist.      Pharynx: Oropharynx is clear. Eyes:      Extraocular Movements: Extraocular movements intact. Conjunctiva/sclera: Conjunctivae normal.      Pupils: Pupils are equal, round, and reactive to light. Cardiovascular:      Rate and Rhythm: Normal rate and regular rhythm. Pulses: Normal pulses. Heart sounds: Normal heart sounds. Pulmonary:      Effort: Pulmonary effort is normal.      Breath sounds: Normal breath sounds. Skin:     General: Skin is warm. Neurological:      General: No focal deficit present. Mental Status: He is alert and oriented to person, place, and time. Mental status is at baseline. Psychiatric:         Mood and Affect: Mood normal.         Behavior: Behavior normal.         Thought Content: Thought content normal.         Judgment: Judgment normal.           Assessment & Plan     Diagnosis Orders   1. Type 2 diabetes mellitus with diabetic polyneuropathy, without long-term current use of insulin ([facility])  POCT glycosylated hemoglobin (Hb A1C)    glipiZIDE (GLUCOTROL) 10 MG tablet   2.  Essential hypertension         Orders Placed This

## 2021-12-30 ENCOUNTER — HOSPITAL ENCOUNTER (INPATIENT)
Age: 51
LOS: 5 days | Discharge: HOME | DRG: 065 | End: 2022-01-05
Attending: INTERNAL MEDICINE | Admitting: INTERNAL MEDICINE
Payer: MEDICAID

## 2021-12-30 ENCOUNTER — APPOINTMENT (OUTPATIENT)
Dept: CT IMAGING | Age: 51
DRG: 065 | End: 2021-12-30

## 2021-12-30 ENCOUNTER — APPOINTMENT (OUTPATIENT)
Dept: ULTRASOUND IMAGING | Age: 51
DRG: 065 | End: 2021-12-30

## 2021-12-30 ENCOUNTER — APPOINTMENT (OUTPATIENT)
Dept: GENERAL RADIOLOGY | Age: 51
DRG: 065 | End: 2021-12-30

## 2021-12-30 DIAGNOSIS — E11.42 TYPE 2 DIABETES MELLITUS WITH DIABETIC POLYNEUROPATHY, WITHOUT LONG-TERM CURRENT USE OF INSULIN (HCC): ICD-10-CM

## 2021-12-30 DIAGNOSIS — I63.9 ACUTE CVA (CEREBROVASCULAR ACCIDENT) (HCC): ICD-10-CM

## 2021-12-30 DIAGNOSIS — R47.01 APHASIA: ICD-10-CM

## 2021-12-30 DIAGNOSIS — G45.9 TIA (TRANSIENT ISCHEMIC ATTACK): Primary | ICD-10-CM

## 2021-12-30 DIAGNOSIS — I63.232 CEREBROVASCULAR ACCIDENT (CVA) DUE TO STENOSIS OF LEFT CAROTID ARTERY (HCC): ICD-10-CM

## 2021-12-30 DIAGNOSIS — R27.8 INCOORDINATION OF EXTREMITY: ICD-10-CM

## 2021-12-30 LAB
ALBUMIN SERPL-MCNC: 4.4 G/DL (ref 3.5–4.6)
ALP BLD-CCNC: 57 U/L (ref 35–104)
ALT SERPL-CCNC: 25 U/L (ref 0–41)
ANION GAP SERPL CALCULATED.3IONS-SCNC: 10 MEQ/L (ref 9–15)
APTT: 33.8 SEC (ref 24.4–36.8)
AST SERPL-CCNC: 19 U/L (ref 0–40)
BACTERIA: NEGATIVE /HPF
BASOPHILS ABSOLUTE: 0 K/UL (ref 0–0.2)
BASOPHILS RELATIVE PERCENT: 0.8 %
BILIRUB SERPL-MCNC: 0.3 MG/DL (ref 0.2–0.7)
BILIRUBIN URINE: NEGATIVE
BLOOD, URINE: NEGATIVE
BUN BLDV-MCNC: 15 MG/DL (ref 6–20)
CALCIUM SERPL-MCNC: 9.9 MG/DL (ref 8.5–9.9)
CHLORIDE BLD-SCNC: 100 MEQ/L (ref 95–107)
CLARITY: CLEAR
CO2: 28 MEQ/L (ref 20–31)
COLOR: YELLOW
CREAT SERPL-MCNC: 0.62 MG/DL (ref 0.7–1.2)
EOSINOPHILS ABSOLUTE: 0.1 K/UL (ref 0–0.7)
EOSINOPHILS RELATIVE PERCENT: 2.5 %
EPITHELIAL CELLS, UA: NORMAL /HPF (ref 0–5)
GFR AFRICAN AMERICAN: >60
GFR NON-AFRICAN AMERICAN: >60
GLOBULIN: 3.3 G/DL (ref 2.3–3.5)
GLUCOSE BLD-MCNC: 228 MG/DL (ref 70–99)
GLUCOSE URINE: 500 MG/DL
HCT VFR BLD CALC: 42.5 % (ref 42–52)
HEMOGLOBIN: 14.5 G/DL (ref 14–18)
HYALINE CASTS: NORMAL /HPF (ref 0–5)
INR BLD: 1
KETONES, URINE: NEGATIVE MG/DL
LACTIC ACID: 2.4 MMOL/L (ref 0.5–2.2)
LEUKOCYTE ESTERASE, URINE: NEGATIVE
LYMPHOCYTES ABSOLUTE: 1.1 K/UL (ref 1–4.8)
LYMPHOCYTES RELATIVE PERCENT: 20.7 %
MAGNESIUM: 1.7 MG/DL (ref 1.7–2.4)
MCH RBC QN AUTO: 30 PG (ref 27–31.3)
MCHC RBC AUTO-ENTMCNC: 34 % (ref 33–37)
MCV RBC AUTO: 88.1 FL (ref 80–100)
MONOCYTES ABSOLUTE: 0.8 K/UL (ref 0.2–0.8)
MONOCYTES RELATIVE PERCENT: 14.1 %
NEUTROPHILS ABSOLUTE: 3.4 K/UL (ref 1.4–6.5)
NEUTROPHILS RELATIVE PERCENT: 61.9 %
NITRITE, URINE: NEGATIVE
PDW BLD-RTO: 12.5 % (ref 11.5–14.5)
PH UA: 5.5 (ref 5–9)
PLATELET # BLD: 209 K/UL (ref 130–400)
POTASSIUM SERPL-SCNC: 4.6 MEQ/L (ref 3.4–4.9)
PROTEIN UA: 100 MG/DL
PROTHROMBIN TIME: 13.1 SEC (ref 12.3–14.9)
RBC # BLD: 4.82 M/UL (ref 4.7–6.1)
RBC UA: NORMAL /HPF (ref 0–2)
SODIUM BLD-SCNC: 138 MEQ/L (ref 135–144)
SPECIFIC GRAVITY UA: 1.03 (ref 1–1.03)
TOTAL CK: 88 U/L (ref 0–190)
TOTAL PROTEIN: 7.7 G/DL (ref 6.3–8)
TROPONIN: <0.01 NG/ML (ref 0–0.01)
URINE REFLEX TO CULTURE: ABNORMAL
UROBILINOGEN, URINE: 1 E.U./DL
WBC # BLD: 5.4 K/UL (ref 4.8–10.8)
WBC UA: NORMAL /HPF (ref 0–5)

## 2021-12-30 PROCEDURE — 83605 ASSAY OF LACTIC ACID: CPT

## 2021-12-30 PROCEDURE — 85730 THROMBOPLASTIN TIME PARTIAL: CPT

## 2021-12-30 PROCEDURE — 71046 X-RAY EXAM CHEST 2 VIEWS: CPT

## 2021-12-30 PROCEDURE — 70450 CT HEAD/BRAIN W/O DYE: CPT

## 2021-12-30 PROCEDURE — 85610 PROTHROMBIN TIME: CPT

## 2021-12-30 PROCEDURE — 84484 ASSAY OF TROPONIN QUANT: CPT

## 2021-12-30 PROCEDURE — 99283 EMERGENCY DEPT VISIT LOW MDM: CPT

## 2021-12-30 PROCEDURE — 81001 URINALYSIS AUTO W/SCOPE: CPT

## 2021-12-30 PROCEDURE — 93880 EXTRACRANIAL BILAT STUDY: CPT

## 2021-12-30 PROCEDURE — 82550 ASSAY OF CK (CPK): CPT

## 2021-12-30 PROCEDURE — 6370000000 HC RX 637 (ALT 250 FOR IP): Performed by: PHYSICIAN ASSISTANT

## 2021-12-30 PROCEDURE — 83036 HEMOGLOBIN GLYCOSYLATED A1C: CPT

## 2021-12-30 PROCEDURE — 36415 COLL VENOUS BLD VENIPUNCTURE: CPT

## 2021-12-30 PROCEDURE — 83735 ASSAY OF MAGNESIUM: CPT

## 2021-12-30 PROCEDURE — G0378 HOSPITAL OBSERVATION PER HR: HCPCS

## 2021-12-30 PROCEDURE — 80061 LIPID PANEL: CPT

## 2021-12-30 PROCEDURE — 85025 COMPLETE CBC W/AUTO DIFF WBC: CPT

## 2021-12-30 PROCEDURE — 80053 COMPREHEN METABOLIC PANEL: CPT

## 2021-12-30 PROCEDURE — 6370000000 HC RX 637 (ALT 250 FOR IP): Performed by: INTERNAL MEDICINE

## 2021-12-30 RX ORDER — HYDRALAZINE HYDROCHLORIDE 20 MG/ML
10 INJECTION INTRAMUSCULAR; INTRAVENOUS EVERY 6 HOURS PRN
Status: DISCONTINUED | OUTPATIENT
Start: 2021-12-30 | End: 2022-01-05 | Stop reason: HOSPADM

## 2021-12-30 RX ORDER — GLIPIZIDE 5 MG/1
5 TABLET ORAL
Status: ON HOLD | COMMUNITY
End: 2022-01-05 | Stop reason: HOSPADM

## 2021-12-30 RX ORDER — ATORVASTATIN CALCIUM 40 MG/1
40 TABLET, FILM COATED ORAL NIGHTLY
Status: DISCONTINUED | OUTPATIENT
Start: 2021-12-30 | End: 2022-01-05 | Stop reason: HOSPADM

## 2021-12-30 RX ORDER — ASPIRIN 325 MG
325 TABLET ORAL ONCE
Status: COMPLETED | OUTPATIENT
Start: 2021-12-30 | End: 2021-12-30

## 2021-12-30 RX ORDER — ASPIRIN 81 MG/1
81 TABLET ORAL DAILY
Status: DISCONTINUED | OUTPATIENT
Start: 2021-12-30 | End: 2022-01-05 | Stop reason: HOSPADM

## 2021-12-30 RX ORDER — LISINOPRIL 20 MG/1
20 TABLET ORAL DAILY
COMMUNITY
End: 2022-01-28

## 2021-12-30 RX ORDER — PANTOPRAZOLE SODIUM 40 MG/1
40 TABLET, DELAYED RELEASE ORAL
Status: DISCONTINUED | OUTPATIENT
Start: 2021-12-31 | End: 2022-01-05 | Stop reason: HOSPADM

## 2021-12-30 RX ORDER — GLIPIZIDE 10 MG/1
10 TABLET ORAL 2 TIMES DAILY
Qty: 180 TABLET | Refills: 1 | Status: SHIPPED | OUTPATIENT
Start: 2021-12-30 | End: 2021-12-30

## 2021-12-30 RX ADMIN — ATORVASTATIN CALCIUM 40 MG: 40 TABLET, FILM COATED ORAL at 22:00

## 2021-12-30 RX ADMIN — ASPIRIN 325 MG: 325 TABLET, FILM COATED ORAL at 18:39

## 2021-12-30 ASSESSMENT — ENCOUNTER SYMPTOMS
ABDOMINAL PAIN: 0
APNEA: 0
EYE PAIN: 0
TROUBLE SWALLOWING: 0
ALLERGIC/IMMUNOLOGIC NEGATIVE: 1
COLOR CHANGE: 0
SHORTNESS OF BREATH: 0

## 2021-12-30 NOTE — ED PROVIDER NOTES
[address] [facility] ED  EMERGENCY DEPARTMENT ENCOUNTER      Pt Name: Ambar Allan  MRN: [id number]  [place] 1970  Date of evaluation: 12/30/2021  Provider: Latasha Pereira PA-C    CHIEF COMPLAINT       Chief Complaint   Patient presents with    Extremity Weakness     pt c/o RUE/RLE weakness and slurred speech that started approx 1500 today         HISTORY OF PRESENT ILLNESS   (Location/Symptom, Timing/Onset, Context/Setting, Quality, Duration, Modifying Factors, Severity)  Note limiting factors. Ambar Allan is a 45 y.o. male who presents to the emergency department with dysarthria, right upper extremity weakness and right lower extremity weakness. Patient states that the symptoms began mildly yesterday with some intermittent dysarthria. Patient states that approximately 1-1/2 hours ago he was feeling well and went to sit in the bath and his right side became more pronouncedly weak and wife states that he was stumbling on his words. On emergency department arrival, patient's symptoms had improved with the entire event lasting approximately 1 hour. No history of the same in the past.  No change or loss of vision or hearing    HPI    Nursing Notes were reviewed. REVIEW OF SYSTEMS    (2-9 systems for level 4, 10 or more for level 5)     Review of Systems   Constitutional: Negative for diaphoresis and fever. HENT: Negative for hearing loss and trouble swallowing. Eyes: Negative for pain. Respiratory: Negative for apnea and shortness of breath. Cardiovascular: Negative for chest pain. Gastrointestinal: Negative for abdominal pain. Endocrine: Negative. Genitourinary: Negative for hematuria. Musculoskeletal: Negative for neck pain and neck stiffness. Skin: Negative for color change. Allergic/Immunologic: Negative. Neurological: Positive for speech difficulty and weakness. Negative for dizziness and numbness. Hematological: Negative. Psychiatric/Behavioral: Negative. All other systems reviewed and are negative. Except as noted above the remainder of the review of systems was reviewed and negative. PAST MEDICAL HISTORY     Past Medical History:   Diagnosis Date    Hypertension     Type 2 diabetes mellitus ([facility] [address]          SURGICAL HISTORY     No past surgical history on file.       CURRENT MEDICATIONS       Previous Medications    DEXTROMETHORPHAN-GUAIFENESIN (CORICIDIN HBP CONGESTION/COUGH)  MG CAPS    Take 2 tablets by mouth 4 times daily    FLUTICASONE (FLONASE) 50 MCG/ACT NASAL SPRAY    2 sprays by Each Nostril route daily    GLIPIZIDE (GLUCOTROL) 10 MG TABLET    Take 1 tablet by mouth 2 times daily    LISINOPRIL (PRINIVIL;ZESTRIL) 10 MG TABLET    TAKE ONE TABLET BY MOUTH DAILY    METFORMIN (GLUCOPHAGE) 500 MG TABLET    Take 2 tablets by mouth 2 times daily (with meals)    PRAVASTATIN (PRAVACHOL) 20 MG TABLET    Take 1 tablet by mouth daily       ALLERGIES     Penicillins    FAMILY HISTORY       Family History   Problem Relation Age of Onset    Rheum Arthritis Mother     Osteoarthritis Mother     Hypertension Mother     Cancer Mother         uterine cancer    Other Father         myocardial infarction    Diabetes Father           SOCIAL HISTORY       Social History     Socioeconomic History    Marital status: Single     Spouse name: Not on file    Number of children: Not on file    Years of education: Not on file    Highest education level: Not on file   Occupational History    Not on file   Tobacco Use    Smoking status: Never Smoker    Smokeless tobacco: Never Used   Substance and Sexual Activity    Alcohol use: No     Alcohol/week: 0.0 standard drinks    Drug use: No    Sexual activity: Not on file   Other Topics Concern    Not on file   Social History Narrative    Not on file     Social Determinants of Health     Financial Resource Strain: Low Risk     Difficulty of Paying Living Expenses: Not hard at all   Food Insecurity: No Food Insecurity    Worried About [address] [place] [address] in the Last Year: Never true    Tai of Food in the Last Year: Never true   Transportation Needs: No Transportation Needs    Lack of Transportation (Medical): No    Lack of Transportation (Non-Medical): No   Physical Activity:     Days of Exercise per Week: Not on file    Minutes of Exercise per Session: Not on file   Stress:     Feeling of Stress : Not on file   Social Connections:     Frequency of Communication with Friends and Family: Not on file    Frequency of Social Gatherings with Friends and Family: Not on file    Attends Yazdanism Services: Not on file    Active Member of [address] [place] [address] [employer] or Organizations: Not on file    Attends Club or Organization Meetings: Not on file    Marital Status: Not on file   Intimate Partner Violence:     Fear of Current or Ex-Partner: Not on file    Emotionally Abused: Not on file    Physically Abused: Not on file    Sexually Abused: Not on file   Housing Stability:     Unable to Pay for Housing in the Last Year: Not on file    Number of [place] in the Last Year: Not on file    Unstable Housing in the Last Year: Not on file       SCREENINGS   NIH Stroke Scale  Interval: Baseline  Level of Consciousness (1a. ): Alert  LOC Questions (1b. ):  Answers both correctly  LOC Commands (1c. ): Performs both tasks correctly  Best Gaze (2. ): Normal  Visual (3. ): No visual loss  Facial Palsy (4. ): Normal symmetrical movement  Motor Arm, Left (5a. ): No drift  Motor Arm, Right (5b. ): No drift  Motor Leg, Left (6a. ): No drift  Motor Leg, Right (6b. ): No drift  Limb Ataxia (7. ): Absent  Sensory (8. ): Normal  Best Language (9. ): No aphasia  Dysarthria (10. ): Mild to moderate, slurs some words  Extinction and Inattention (11): No abnormality  Total: 1                    PHYSICAL EXAM    (up to 7 for level 4, 8 or more for level 5)     ED Triage Vitals [12/30/21 1635]   BP Temp Temp Source Pulse Resp SpO2 Height Weight   (!) 165/89 97.4 °F (36.3 °C) Oral 102 16 98 % 5' 9\" (1.753 m) 240 lb (108.9 kg)       Physical Exam  Vitals and nursing note reviewed. Constitutional:       General: He is not in acute distress. Appearance: He is well-developed. He is not diaphoretic. HENT:      Head: Normocephalic and atraumatic. Mouth/Throat:      Pharynx: No oropharyngeal exudate. Eyes:      General: No scleral icterus. Conjunctiva/sclera: Conjunctivae normal.      Pupils: Pupils are equal, round, and reactive to light. Neck:      Trachea: No tracheal deviation. Cardiovascular:      Rate and Rhythm: Normal rate. Heart sounds: Normal heart sounds. Pulmonary:      Effort: Pulmonary effort is normal. No respiratory distress. Breath sounds: Normal breath sounds. Abdominal:      General: Bowel sounds are normal. There is no distension. Palpations: Abdomen is soft. Musculoskeletal:         General: Normal range of motion. Cervical back: Normal range of motion and neck supple. Skin:     General: Skin is warm and dry. Findings: No erythema or rash. Neurological:      Mental Status: He is alert and oriented to person, place, and time. Cranial Nerves: Dysarthria present. No cranial nerve deficit. Motor: No abnormal muscle tone. Psychiatric:         Behavior: Behavior normal.         Thought Content:  Thought content normal.         Judgment: Judgment normal.         DIAGNOSTIC RESULTS     EKG: All EKG's are interpreted by the Emergency Department Physician who either signs or Co-signs this chart in the absence of a cardiologist.      RADIOLOGY:   Non-plain film images such as CT, Ultrasound and MRI are read by the radiologist. Plain radiographic images are visualized and preliminarily interpreted by the emergency physician with the below findings:      Interpretation per the Radiologist below, if available at the time of this note:    [facility] Final Result      XR CHEST (2 VW)    (Results Pending)         ED BEDSIDE ULTRASOUND:   Performed by ED Physician - none    LABS:  Labs Reviewed   COMPREHENSIVE METABOLIC PANEL - Abnormal; Notable for the following components:       Result Value    Glucose 228 (*)     CREATININE 0.62 (*)     All other components within normal limits   LACTIC ACID, PLASMA - Abnormal; Notable for the following components:    Lactic Acid 2.4 (*)     All other components within normal limits   PROTIME-INR   APTT   CBC WITH AUTO DIFFERENTIAL   MAGNESIUM   TROPONIN   CK   URINE RT REFLEX TO CULTURE       All other labs were within normal range or not returned as of this dictation. EMERGENCY DEPARTMENT COURSE and DIFFERENTIAL DIAGNOSIS/MDM:   Vitals:    Vitals:    12/30/21 1635   BP: (!) 165/89   Pulse: 102   Resp: 16   Temp: 97.4 °F (36.3 °C)   TempSrc: Oral   SpO2: 98%   Weight: 240 lb (108.9 kg)   Height: 5' 9\" (1.753 m)           MDM      REASSESSMENT       Patient presented the emergency department after an episode of right-sided weakness and dysarthria. Symptoms had resolved on emergency department arrival other than some minimal dysarthria. CT of the head is negative and laboratory testing is normal however patient does describe. Of symptoms concerning for TIA. Patient was given 325 mg of aspirin and will be admitted to the hospital for further evaluation and care        CONSULTS:  None    PROCEDURES:  Unless otherwise noted below, none     Procedures        FINAL IMPRESSION      1. TIA (transient ischemic attack)          DISPOSITION/PLAN   DISPOSITION Decision To Admit 12/30/2021 06:24:51 PM      PATIENT REFERRED TO:  No follow-up provider specified. DISCHARGE MEDICATIONS:  New Prescriptions    No medications on file     Controlled Substances Monitoring:     No flowsheet data found.     (Please note that portions of this note were completed with a voice recognition program.  Efforts were made to edit the dictations but occasionally words are mis-transcribed.)    Franck Pretty PA-C (electronically signed)  Attending Emergency Physician            Franck Pretty PA-C  12/30/21 [id number]

## 2021-12-30 NOTE — Clinical Note
Patient Class: Observation [104]   REQUIRED: Diagnosis: TIA (transient ischemic attack) [542681]   Estimated Length of Stay: Estimated stay of less than 2 midnights   Admitting Provider: Heaven Beth [[id number]]

## 2021-12-31 ENCOUNTER — APPOINTMENT (OUTPATIENT)
Dept: CT IMAGING | Age: 51
DRG: 065 | End: 2021-12-31

## 2021-12-31 ENCOUNTER — APPOINTMENT (OUTPATIENT)
Dept: MRI IMAGING | Age: 51
DRG: 065 | End: 2021-12-31

## 2021-12-31 PROBLEM — I63.9 ACUTE CVA (CEREBROVASCULAR ACCIDENT) (HCC): Status: ACTIVE | Noted: 2021-12-31

## 2021-12-31 LAB
ALBUMIN SERPL-MCNC: 3.7 G/DL (ref 3.5–4.6)
ALBUMIN SERPL-MCNC: 3.8 G/DL (ref 3.5–4.6)
ALP BLD-CCNC: 44 U/L (ref 35–104)
ALP BLD-CCNC: 47 U/L (ref 35–104)
ALT SERPL-CCNC: 20 U/L (ref 0–41)
ALT SERPL-CCNC: 22 U/L (ref 0–41)
ANION GAP SERPL CALCULATED.3IONS-SCNC: 10 MEQ/L (ref 9–15)
ANION GAP SERPL CALCULATED.3IONS-SCNC: 12 MEQ/L (ref 9–15)
AST SERPL-CCNC: 15 U/L (ref 0–40)
AST SERPL-CCNC: 17 U/L (ref 0–40)
BASOPHILS ABSOLUTE: 0 K/UL (ref 0–0.2)
BASOPHILS ABSOLUTE: 0 K/UL (ref 0–0.2)
BASOPHILS RELATIVE PERCENT: 0.4 %
BASOPHILS RELATIVE PERCENT: 0.5 %
BILIRUB SERPL-MCNC: 0.3 MG/DL (ref 0.2–0.7)
BILIRUB SERPL-MCNC: <0.2 MG/DL (ref 0.2–0.7)
BUN BLDV-MCNC: 13 MG/DL (ref 6–20)
BUN BLDV-MCNC: 17 MG/DL (ref 6–20)
CALCIUM SERPL-MCNC: 8.5 MG/DL (ref 8.5–9.9)
CALCIUM SERPL-MCNC: 9 MG/DL (ref 8.5–9.9)
CHLORIDE BLD-SCNC: 102 MEQ/L (ref 95–107)
CHLORIDE BLD-SCNC: 104 MEQ/L (ref 95–107)
CHOLESTEROL, TOTAL: 127 MG/DL (ref 0–199)
CO2: 24 MEQ/L (ref 20–31)
CO2: 25 MEQ/L (ref 20–31)
CREAT SERPL-MCNC: 0.52 MG/DL (ref 0.7–1.2)
CREAT SERPL-MCNC: 0.56 MG/DL (ref 0.7–1.2)
EOSINOPHILS ABSOLUTE: 0.2 K/UL (ref 0–0.7)
EOSINOPHILS ABSOLUTE: 0.2 K/UL (ref 0–0.7)
EOSINOPHILS RELATIVE PERCENT: 3.2 %
EOSINOPHILS RELATIVE PERCENT: 4.1 %
GFR AFRICAN AMERICAN: >60
GFR AFRICAN AMERICAN: >60
GFR NON-AFRICAN AMERICAN: >60
GFR NON-AFRICAN AMERICAN: >60
GLOBULIN: 2.7 G/DL (ref 2.3–3.5)
GLOBULIN: 3 G/DL (ref 2.3–3.5)
GLUCOSE BLD-MCNC: 139 MG/DL (ref 60–115)
GLUCOSE BLD-MCNC: 142 MG/DL (ref 60–115)
GLUCOSE BLD-MCNC: 205 MG/DL (ref 60–115)
GLUCOSE BLD-MCNC: 211 MG/DL (ref 70–99)
GLUCOSE BLD-MCNC: 215 MG/DL (ref 70–99)
HBA1C MFR BLD: 9 % (ref 4.8–5.9)
HCT VFR BLD CALC: 36.7 % (ref 42–52)
HCT VFR BLD CALC: 38.5 % (ref 42–52)
HDLC SERPL-MCNC: 40 MG/DL (ref 40–59)
HEMOGLOBIN: 12.3 G/DL (ref 14–18)
HEMOGLOBIN: 13 G/DL (ref 14–18)
LDL CHOLESTEROL CALCULATED: 53 MG/DL (ref 0–129)
LYMPHOCYTES ABSOLUTE: 1.5 K/UL (ref 1–4.8)
LYMPHOCYTES ABSOLUTE: 1.5 K/UL (ref 1–4.8)
LYMPHOCYTES RELATIVE PERCENT: 24.8 %
LYMPHOCYTES RELATIVE PERCENT: 30.4 %
MCH RBC QN AUTO: 29.8 PG (ref 27–31.3)
MCH RBC QN AUTO: 29.9 PG (ref 27–31.3)
MCHC RBC AUTO-ENTMCNC: 33.6 % (ref 33–37)
MCHC RBC AUTO-ENTMCNC: 33.8 % (ref 33–37)
MCV RBC AUTO: 88.2 FL (ref 80–100)
MCV RBC AUTO: 88.9 FL (ref 80–100)
MONOCYTES ABSOLUTE: 0.6 K/UL (ref 0.2–0.8)
MONOCYTES ABSOLUTE: 0.7 K/UL (ref 0.2–0.8)
MONOCYTES RELATIVE PERCENT: 11.5 %
MONOCYTES RELATIVE PERCENT: 12.7 %
NEUTROPHILS ABSOLUTE: 2.6 K/UL (ref 1.4–6.5)
NEUTROPHILS ABSOLUTE: 3.6 K/UL (ref 1.4–6.5)
NEUTROPHILS RELATIVE PERCENT: 52.3 %
NEUTROPHILS RELATIVE PERCENT: 60.1 %
PDW BLD-RTO: 12.3 % (ref 11.5–14.5)
PDW BLD-RTO: 12.5 % (ref 11.5–14.5)
PERFORMED ON: ABNORMAL
PLATELET # BLD: 175 K/UL (ref 130–400)
PLATELET # BLD: 198 K/UL (ref 130–400)
POTASSIUM SERPL-SCNC: 4 MEQ/L (ref 3.4–4.9)
POTASSIUM SERPL-SCNC: 4.4 MEQ/L (ref 3.4–4.9)
RBC # BLD: 4.13 M/UL (ref 4.7–6.1)
RBC # BLD: 4.36 M/UL (ref 4.7–6.1)
SARS-COV-2, NAAT: NOT DETECTED
SODIUM BLD-SCNC: 138 MEQ/L (ref 135–144)
SODIUM BLD-SCNC: 139 MEQ/L (ref 135–144)
TOTAL PROTEIN: 6.4 G/DL (ref 6.3–8)
TOTAL PROTEIN: 6.8 G/DL (ref 6.3–8)
TRIGL SERPL-MCNC: 170 MG/DL (ref 0–150)
WBC # BLD: 5 K/UL (ref 4.8–10.8)
WBC # BLD: 6.1 K/UL (ref 4.8–10.8)

## 2021-12-31 PROCEDURE — 36415 COLL VENOUS BLD VENIPUNCTURE: CPT

## 2021-12-31 PROCEDURE — 85025 COMPLETE CBC W/AUTO DIFF WBC: CPT

## 2021-12-31 PROCEDURE — 70551 MRI BRAIN STEM W/O DYE: CPT

## 2021-12-31 PROCEDURE — 6370000000 HC RX 637 (ALT 250 FOR IP): Performed by: INTERNAL MEDICINE

## 2021-12-31 PROCEDURE — 1210000000 HC MED SURG R&B

## 2021-12-31 PROCEDURE — 6360000002 HC RX W HCPCS: Performed by: INTERNAL MEDICINE

## 2021-12-31 PROCEDURE — 70498 CT ANGIOGRAPHY NECK: CPT

## 2021-12-31 PROCEDURE — 70496 CT ANGIOGRAPHY HEAD: CPT

## 2021-12-31 PROCEDURE — 96374 THER/PROPH/DIAG INJ IV PUSH: CPT

## 2021-12-31 PROCEDURE — 80053 COMPREHEN METABOLIC PANEL: CPT

## 2021-12-31 PROCEDURE — 6360000004 HC RX CONTRAST MEDICATION: Performed by: PSYCHIATRY & NEUROLOGY

## 2021-12-31 PROCEDURE — 87635 SARS-COV-2 COVID-19 AMP PRB: CPT

## 2021-12-31 PROCEDURE — 99291 CRITICAL CARE FIRST HOUR: CPT | Performed by: PSYCHIATRY & NEUROLOGY

## 2021-12-31 PROCEDURE — 93005 ELECTROCARDIOGRAM TRACING: CPT

## 2021-12-31 RX ORDER — LORAZEPAM 2 MG/ML
1 INJECTION INTRAMUSCULAR ONCE
Status: COMPLETED | OUTPATIENT
Start: 2021-12-31 | End: 2021-12-31

## 2021-12-31 RX ORDER — GLIPIZIDE 10 MG/1
10 TABLET ORAL
Status: DISCONTINUED | OUTPATIENT
Start: 2021-12-31 | End: 2022-01-05 | Stop reason: HOSPADM

## 2021-12-31 RX ORDER — LISINOPRIL 10 MG/1
10 TABLET ORAL DAILY
Status: DISCONTINUED | OUTPATIENT
Start: 2021-12-31 | End: 2022-01-05

## 2021-12-31 RX ORDER — GLIPIZIDE 5 MG/1
5 TABLET ORAL
Status: DISCONTINUED | OUTPATIENT
Start: 2021-12-31 | End: 2021-12-31

## 2021-12-31 RX ADMIN — LISINOPRIL 10 MG: 10 TABLET ORAL at 18:08

## 2021-12-31 RX ADMIN — LORAZEPAM 1 MG: 2 INJECTION INTRAMUSCULAR; INTRAVENOUS at 10:51

## 2021-12-31 RX ADMIN — PANTOPRAZOLE SODIUM 40 MG: 40 TABLET, DELAYED RELEASE ORAL at 08:29

## 2021-12-31 RX ADMIN — IOPAMIDOL 100 ML: 612 INJECTION, SOLUTION INTRAVENOUS at 14:42

## 2021-12-31 RX ADMIN — ATORVASTATIN CALCIUM 40 MG: 40 TABLET, FILM COATED ORAL at 23:14

## 2021-12-31 RX ADMIN — GLIPIZIDE 10 MG: 10 TABLET ORAL at 17:13

## 2021-12-31 NOTE — PROGRESS NOTES
Progress Note  Date:2021       Room:  Patient Lyubov May     YOB: 1970     Age:51 y.o. Subjective    Subjective:  Symptoms:  No shortness of breath, malaise, cough, chest pain, weakness, headache, chest pressure, anorexia, diarrhea or anxiety. Diet:  No nausea or vomiting. Review of Systems   Respiratory: Negative for cough and shortness of breath. Cardiovascular: Negative for chest pain. Gastrointestinal: Negative for anorexia, diarrhea, nausea and vomiting. Neurological: Negative for weakness. Objective         Vitals Last 24 Hours:  TEMPERATURE:  Temp  Av.4 °F (36.3 °C)  Min: 97.4 °F (36.3 °C)  Max: 97.4 °F (36.3 °C)  RESPIRATIONS RANGE: Resp  Avg: 15.5  Min: 14  Max: 18  PULSE OXIMETRY RANGE: SpO2  Av.4 %  Min: 93 %  Max: 98 %  PULSE RANGE: Pulse  Av.1  Min: 81  Max: 102  BLOOD PRESSURE RANGE: Systolic (10OHQ), BX , Min:113 , YVN:707   ; Diastolic (02RGL), QFK:60, Min:74, Max:89    I/O (24Hr): No intake or output data in the 24 hours ending 21 1305  Objective:  General Appearance:  Comfortable, well-appearing and in no acute distress. Vital signs: (most recent): Blood pressure 132/82, pulse 90, temperature 97.4 °F (36.3 °C), temperature source Oral, resp. rate 18, height 5' 9\" (1.753 m), weight 240 lb (108.9 kg), SpO2 97 %. HEENT: Normal HEENT exam.    Lungs:  Normal effort. Heart: Normal rate. Abdomen: Abdomen is soft. Bowel sounds are normal.     Pulses: Distal pulses are intact. Pupils:  Pupils are equal, round, and reactive to light. Skin:  Warm.       Labs/Imaging/Diagnostics    Labs:  CBC:Recent Labs     21  1654 21  0600   WBC 5.4 5.0   RBC 4.82 4.13*   HGB 14.5 12.3*   HCT 42.5 36.7*   MCV 88.1 88.9   RDW 12.5 12.5    175     CHEMISTRIES:  Recent Labs     21  1654 21  0600    139   K 4.6 4.0    104   CO2 28 25   BUN 15 17   CREATININE 0.62* 0.52* GLUCOSE 228* 215*   MG 1.7  --      PT/INR:  Recent Labs     12/30/21  1654   PROTIME 13.1   INR 1.0     APTT:  Recent Labs     12/30/21  1654   APTT 33.8     LIVER PROFILE:  Recent Labs     12/30/21  1654 12/31/21  0600   AST 19 15   ALT 25 20   BILITOT 0.3 <0.2   ALKPHOS 57 44       Imaging Last 24 Hours:  XR CHEST (2 VW)    Result Date: 12/31/2021  EXAMINATION: XR CHEST (2 VW) CLINICAL HISTORY: DYSARTHRIA COMPARISONS: None available. FINDINGS: Osseous structures are intact. Cardiopericardial silhouette is normal. Pulmonary vasculature is normal. Lungs are clear. NO ACUTE CARDIOPULMONARY DISEASE. CT HEAD WO CONTRAST    Result Date: 12/30/2021  EXAMINATION: CT of the brain without contrast HISTORY: Stroke. Dizziness. Slurred speech. COMPARISON: None available TECHNIQUE: Multiple contiguous axial images were obtained of the brain from the skull base through the vertex. Multiplanar reformats were obtained. FINDINGS: Prominence of the sulci and ventricles compatible with mild generalized parenchymal volume loss. Areas of bilateral supratentorial white matter hypoattenuation are nonspecific but most likely related to chronic small vessel ischemic changes in a patient of this age. Gray-white matter differentiation is preserved. No acute hemorrhage or abnormal extra-axial fluid collection. No mass effect or midline shift. Mild paranasal sinus mucosal thickening. The mastoid air cells are clear. Calvarium is intact. Two right frontal scalp lesions measure approximately 10 mm and 15 mm respectively. The posterior left scalp lesion measures approximately 5 mm and a left frontal scalp lesion measures approximately 5 mm. Clinical correlation is recommended. No acute intracranial process. All CT scans at this facility use dose modulation, iterative reconstruction, and/or weight based dosing when appropriate to reduce radiation dose to as low as reasonably achievable.     MRI BRAIN WO CONTRAST    Result Date: 12/31/2021  EXAMINATION:  MRI BRAIN WO CONTRAST HISTORY:  New onset slurred speech TECHNIQUE:  MRI brain routine protocol without contrast. COMPARISON:  CT brain 12/30/2021 RESULT: Acute Change:  Multiple foci of restricted diffusion along the left centrum semiovale, corona radiata and frontal operculum compatible with acute/subacute left MCA distribution infarcts. Hemorrhage:  No evidence of prior parenchymal hemorrhage on the susceptibility weighted sequences. Mass Lesion/ Mass Effect:  No evidence of an intracranial mass or extra-axial fluid collection. No significant mass effect. Chronic Change:  Increased T2/FLAIR signal within the area of acute infarct in the left frontal lobe. Parenchyma:  No significant parenchymal volume loss for age. Ventricles:  Normal caliber and morphology. Skull Base:  Hypothalamic and pituitary region are grossly normal. Craniocervical junction is normal. No significant marrow replacement process. Vasculature:  Major intracranial arteries and dural venous sinuses demonstrate typical flow voids, suggesting patency by spin echo criteria. Other:  Bilateral maxillary sinus mucus retention cysts. Mild paranasal sinus mucosal thickening otherwise. Mastoid air cells are clear. The orbits are unremarkable. Multiple cutaneous soft tissue lesions predominantly along the right face. Multiple foci of left frontal lobe restricted diffusion compatible with acute/subacute Left MCA distribution infarct. No hemorrhage. COMMUNICATION:  Communicated with: Abdullahi Quinn , ER nurse on 12/31/2021 at 12:31 PM 12:31 PM.       US CAROTID ARTERY BILATERAL    Result Date: 12/31/2021  EXAMINATION: US CAROTID ARTERY BILATERAL HISTORY:   TIA vs CVA . Slurred speech, right-sided weakness COMPARISON:None TECHNIQUE: Carotid duplex sonograms which include gray scale and color flow evaluation are complimented with spectral waveform analysis.  Please refer to chart below for specific carotid velocity measurements. The degree of stenosis recorded on this exam uses the same method of stratification used in the NASCET trials. This complies with ACR practice guidelines and the Society of radiology in ultrasound consensus statement and provides adequate information for clinical decision making. Society of Radiologists in Ultrasound (SRU) consensus statement was used to estimate internal carotid artery stenosis. RESULT: There is moderate to severe plaque in the left internal carotid artery, predominantly involving the proximal segment. There is mild plaque in right internal carotid artery. There is turbulent flow at the level of the proximal left internal carotid artery indicating significant stenosis, with high resistant waveforms seen in the mid to distal left internal carotid artery. There is antegrade flow identified in both vertebral arteries. ARTERIAL BLOOD FLOW VELOCITY RIGHT PEAK SYSTOLIC VELOCITIES (PSV)                                               Prox CCA    65 cm/s             Mid CCA     79 cm/s              Dist CCA    56 cm/s           Prox ICA    46 cm/s               Mid ICA     106 cm/s            Dist ICA    81 cm/s             Prox ECA    75 cm/s             Prox VERT   76 cm/s              ICA/CCA     1.3                        LEFT PEAK SYSTOLIC VELOCITIES (PSV)  Prox CCA    101 cm/s Mid CCA     70 cm/s Dist CCA    54 cm/s Prox ICA    31 cm/s Mid ICA     46 cm/s Dist ICA    48 cm/s Prox ECA    131 cm/s Prox VERT   44 cm/s ICA/CCA     0.7     HIGH-GRADE STENOSIS OF THE PROXIMAL LEFT INTERNAL CAROTID ARTERY. <50  % STENOSIS IN THE RIGHT ICA ANTEGRADE FLOW IN THE BILATERAL VERTEBRAL ARTERIES     Assessment//Plan           Hospital Problems           Last Modified POA    TIA (transient ischemic attack) 12/30/2021 Yes    Acute CVA (cerebrovascular accident) ([address] 12/31/2021 Yes      Carotid vascular dz    Assessment & Plan  12/31: Follow-up MRI, follow-up official report of CT angio head and neck. Patient has carotid vascular disease. Hemoglobin A1c is 9 counseling was given, made patient inpatient as per physician advisor recommendation.  [facility] neuro recommendation  Electronically signed by Georgia Mandujano MD on 12/31/21 at 1:05 PM EST

## 2021-12-31 NOTE — ED NOTES
Pt home medications updated from medications brought in by pt. Meds will be sent home with wife.      Vicky Dougherty RN  12/30/21 [id number]

## 2021-12-31 NOTE — CONSULTS
Subjective:      Patient ID: Parish Rubi is a 45 y.o. male who presents today for stroke. HPI 45 a right-handed woman who I am seeing in the emergency room with new onset of strokelike symptoms. Yesterday in the afternoon patient appeared to become slightly confused and then had some slurred speech with word finding difficulty. Patient came to the hospital at least 24 hours later after his symptoms. He still has some residual word finding difficulty. He had reported some weakness on the right side. Patient has not any past history of cerebrovascular disease or events. Patient does have multiple risk factors for some vascular disease including diabetes and hypertension. He does not have hypercholesterolemia and is a non-smoker. 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 Patient reports no headaches rash joint swelling. Patient was not on antiplatelet agent    Review of Systems   Constitutional: Negative for fever. HENT: Negative for ear pain, tinnitus and trouble swallowing. Eyes: Negative for photophobia and visual disturbance. Respiratory: Negative for choking and shortness of breath. Cardiovascular: Negative for chest pain and palpitations. Gastrointestinal: Negative for nausea and vomiting. Musculoskeletal: Negative for back pain, gait problem, joint swelling, myalgias, neck pain and neck stiffness. Skin: Negative for color change. Allergic/Immunologic: Negative for food allergies. Neurological: Positive for speech difficulty and weakness. Negative for dizziness, tremors, seizures, syncope, facial asymmetry, light-headedness, numbness and headaches. Psychiatric/Behavioral: Negative for behavioral problems, confusion, hallucinations and sleep disturbance. Past Medical History:   Diagnosis Date    Hypertension     Type 2 diabetes mellitus ([facility] [address]      No past surgical history on file.   Social History     Socioeconomic History    Marital status: Single Spouse name: Not on file    Number of children: Not on file    Years of education: Not on file    Highest education level: Not on file   Occupational History    Not on file   Tobacco Use    Smoking status: Never Smoker    Smokeless tobacco: Never Used   Substance and Sexual Activity    Alcohol use: No     Alcohol/week: 0.0 standard drinks    Drug use: No    Sexual activity: Not on file   Other Topics Concern    Not on file   Social History Narrative    Not on file     Social Determinants of Health     Financial Resource Strain: Low Risk     Difficulty of Paying Living Expenses: Not hard at all   Food Insecurity: No Food Insecurity    Worried About [address] [place] [address] in the Last Year: Never true    Tai of Food in the Last Year: Never true   Transportation Needs: No Transportation Needs    Lack of Transportation (Medical): No    Lack of Transportation (Non-Medical):  No   Physical Activity:     Days of Exercise per Week: Not on file    Minutes of Exercise per Session: Not on file   Stress:     Feeling of Stress : Not on file   Social Connections:     Frequency of Communication with Friends and Family: Not on file    Frequency of Social Gatherings with Friends and Family: Not on file    Attends Rastafari Services: Not on file    Active Member of [address] [place] [address] or Organizations: Not on file    Attends Club or Organization Meetings: Not on file    Marital Status: Not on file   Intimate Partner Violence:     Fear of Current or Ex-Partner: Not on file    Emotionally Abused: Not on file    Physically Abused: Not on file    Sexually Abused: Not on file   Housing Stability:     Unable to Pay for Housing in the Last Year: Not on file    Number of [place] in the Last Year: Not on file    Unstable Housing in the Last Year: Not on file     Family History   Problem Relation Age of Onset    Rheum Arthritis Mother     Osteoarthritis Mother     Hypertension Mother     Cancer Mother         uterine cancer    Other Father         myocardial infarction    Diabetes Father      Allergies   Allergen Reactions    Penicillins Hives and Swelling     Current Facility-Administered Medications   Medication Dose Route Frequency Provider Last Rate Last Admin    glipiZIDE (GLUCOTROL) tablet 5 mg  5 mg Oral BID AC Manuel Newman MD        metFORMIN (GLUCOPHAGE) tablet 750 mg  750 mg Oral TID Manuel Newman MD        LORazepam (ATIVAN) injection 1 mg  1 mg IntraVENous Once Manuel Newman MD        insulin lispro (HUMALOG) injection vial 0-6 Units  0-6 Units SubCUTAneous TID WC Manuel Newman MD        insulin lispro (HUMALOG) injection vial 0-3 Units  0-3 Units SubCUTAneous Nightly Manuel Newman MD        hydrALAZINE (APRESOLINE) injection 10 mg  10 mg IntraVENous Q6H PRN Manuel Newman MD        aspirin EC tablet 81 mg  81 mg Oral Daily Manuel Newman MD        atorvastatin (LIPITOR) tablet 40 mg  40 mg Oral Nightly Manuel Newman MD   40 mg at 12/30/21 2200    pantoprazole (PROTONIX) tablet 40 mg  40 mg Oral QAM  Manuel Newman MD   40 mg at 12/31/21 0829    enoxaparin (LOVENOX) injection 40 mg  40 mg SubCUTAneous Daily Manuel Newman MD         Current Outpatient Medications   Medication Sig Dispense Refill    lisinopril (PRINIVIL;ZESTRIL) 20 MG tablet Take 20 mg by mouth daily      glipiZIDE (GLUCOTROL) 5 MG tablet Take 5 mg by mouth 2 times daily (before meals)      metFORMIN (GLUCOPHAGE) 500 MG tablet Take 2 tablets by mouth 2 times daily (with meals) (Patient taking differently: Take 750 mg by mouth 3 times daily 3 times daily.) 360 tablet 1    pravastatin (PRAVACHOL) 20 MG tablet Take 1 tablet by mouth daily 90 tablet 1    fluticasone (FLONASE) 50 MCG/ACT nasal spray 2 sprays by Each Nostril route daily 1 Bottle 0        Objective:   /83   Pulse 86   Temp 97.4 °F (36.3 °C) (Oral)   Resp 14   Ht 5' 9\" (1.753 m)   Wt 240 lb (108.9 kg)   SpO2 96%   BMI 35.44 kg/m²     Physical Exam  Vitals reviewed. Eyes:      Pupils: Pupils are equal, round, and reactive to light. Cardiovascular:      Rate and Rhythm: Normal rate and regular rhythm. Heart sounds: No murmur heard. Pulmonary:      Effort: Pulmonary effort is normal.      Breath sounds: Normal breath sounds. Abdominal:      General: Bowel sounds are normal.   Musculoskeletal:         General: Normal range of motion. Cervical back: Normal range of motion. Skin:     General: Skin is warm. Neurological:      Mental Status: He is alert and oriented to person, place, and time. Cranial Nerves: No cranial nerve deficit. Sensory: No sensory deficit. Motor: No abnormal muscle tone. Coordination: Coordination normal.      Deep Tendon Reflexes: Reflexes are normal and symmetric. Babinski sign absent on the right side. Babinski sign absent on the left side. Psychiatric:         Mood and Affect: Mood normal.     Minor weakness on the right side with some staggering is notable. He is still some word finding difficulty. XR CHEST (2 VW)    Result Date: 12/31/2021  EXAMINATION: XR CHEST (2 VW) CLINICAL HISTORY: DYSARTHRIA COMPARISONS: None available. FINDINGS: Osseous structures are intact. Cardiopericardial silhouette is normal. Pulmonary vasculature is normal. Lungs are clear. NO ACUTE CARDIOPULMONARY DISEASE. CT HEAD WO CONTRAST    Result Date: 12/30/2021  EXAMINATION: CT of the brain without contrast HISTORY: Stroke. Dizziness. Slurred speech. COMPARISON: None available TECHNIQUE: Multiple contiguous axial images were obtained of the brain from the skull base through the vertex. Multiplanar reformats were obtained. FINDINGS: Prominence of the sulci and ventricles compatible with mild generalized parenchymal volume loss. Areas of bilateral supratentorial white matter hypoattenuation are nonspecific but most likely related to chronic small vessel ischemic changes in a patient of this age.  Gray-white matter differentiation is preserved. No acute hemorrhage or abnormal extra-axial fluid collection. No mass effect or midline shift. Mild paranasal sinus mucosal thickening. The mastoid air cells are clear. Calvarium is intact. Two right frontal scalp lesions measure approximately 10 mm and 15 mm respectively. The posterior left scalp lesion measures approximately 5 mm and a left frontal scalp lesion measures approximately 5 mm. Clinical correlation is recommended. No acute intracranial process. All CT scans at this facility use dose modulation, iterative reconstruction, and/or weight based dosing when appropriate to reduce radiation dose to as low as reasonably achievable. US CAROTID ARTERY BILATERAL    Result Date: 12/31/2021  EXAMINATION: US CAROTID ARTERY BILATERAL HISTORY:   TIA vs CVA . Slurred speech, right-sided weakness COMPARISON:None TECHNIQUE: Carotid duplex sonograms which include gray scale and color flow evaluation are complimented with spectral waveform analysis. Please refer to chart below for specific carotid velocity measurements. The degree of stenosis recorded on this exam uses the same method of stratification used in the NASCET trials. This complies with ACR practice guidelines and the Society of radiology in ultrasound consensus statement and provides adequate information for clinical decision making. Society of Radiologists in Ultrasound (SRU) consensus statement was used to estimate internal carotid artery stenosis. RESULT: There is moderate to severe plaque in the left internal carotid artery, predominantly involving the proximal segment. There is mild plaque in right internal carotid artery. There is turbulent flow at the level of the proximal left internal carotid artery indicating significant stenosis, with high resistant waveforms seen in the mid to distal left internal carotid artery. There is antegrade flow identified in both vertebral arteries.  ARTERIAL BLOOD FLOW VELOCITY RIGHT PEAK SYSTOLIC VELOCITIES (PSV)                                               Prox CCA    65 cm/s             Mid CCA     79 cm/s              Dist CCA    56 cm/s           Prox ICA    46 cm/s               Mid ICA     106 cm/s            Dist ICA    81 cm/s             Prox ECA    75 cm/s             Prox VERT   76 cm/s              ICA/CCA     1.3                        LEFT PEAK SYSTOLIC VELOCITIES (PSV)  Prox CCA    101 cm/s Mid CCA     70 cm/s Dist CCA    54 cm/s Prox ICA    31 cm/s Mid ICA     46 cm/s Dist ICA    48 cm/s Prox ECA    131 cm/s Prox VERT   44 cm/s ICA/CCA     0.7     HIGH-GRADE STENOSIS OF THE PROXIMAL LEFT INTERNAL CAROTID ARTERY.   <50  % STENOSIS IN THE RIGHT ICA ANTEGRADE FLOW IN THE BILATERAL VERTEBRAL ARTERIES       Lab Results   Component Value Date    WBC 5.0 12/31/2021    RBC 4.13 12/31/2021    HGB 12.3 12/31/2021    HCT 36.7 12/31/2021    MCV 88.9 12/31/2021    MCH 29.9 12/31/2021    MCHC 33.6 12/31/2021    RDW 12.5 12/31/2021     12/31/2021     Lab Results   Component Value Date     12/31/2021    K 4.0 12/31/2021     12/31/2021    CO2 25 12/31/2021    BUN 17 12/31/2021    CREATININE 0.52 12/31/2021    GFRAA >60.0 12/31/2021    LABGLOM >60.0 12/31/2021    GLUCOSE 215 12/31/2021    PROT 6.4 12/31/2021    LABALBU 3.7 12/31/2021    CALCIUM 8.5 12/31/2021    BILITOT <0.2 12/31/2021    ALKPHOS 44 12/31/2021    AST 15 12/31/2021    ALT 20 12/31/2021     Lab Results   Component Value Date    PROTIME 13.1 12/30/2021    INR 1.0 12/30/2021     Lab Results   Component Value Date    TSH 1.340 05/11/2021     Lab Results   Component Value Date    TRIG 170 12/30/2021    HDL 40 12/30/2021    LDLCALC 53 12/30/2021     No results found for: Daniela Paci, CANNAB, COCAINESCRN, LABMETH, OPIATESCREENURINE, PHENCYCLIDINESCREENURINE, PPXUR, ETOH  No results found for: LITHIUM, DILFRTOT, VALPROATE    Assessment:   Left cerebral stroke with subtle right-sided findings with word finding difficulty. Patient has multiple risk factors for cerebrovascular disease. 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 Patient was not on antiplatelet agent. I recommended that we start him on splint for now and Lipitor and complete his work-up with a MRI of the brain and carotids. 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  We will hold off the Plavix for now as he may require urgent intervention. Patient seen urgently in the emergency room critical care time of 45 minutes    Jose C Auguste MD, Sandip Terrell, American Board of Psychiatry & Neurology  Board Certified in Vascular Neurology  Board Certified in Neuromuscular Medicine  Certified in [facility]:

## 2021-12-31 NOTE — H&P
Man with past medical history of hypertension and diabetes, HLD comes in with slurring speech tonight following bathroom washing up,  Patient states that approximately 1-1/2 hours ago     and yesterday it resolved on its own. CT head was negative. Patient decided not to come to the hospital yesterday because he does not have insurance. Patient is a . Denies tobacco use. Denies blurry vision, headache, chest pain or shortness of breath. Patient will get aspirin in the ER. Past Medical History:   Diagnosis Date    Hypertension     Type 2 diabetes mellitus ([facility] [address]      No past surgical history on file. Social History     Socioeconomic History    Marital status: Single     Spouse name: Not on file    Number of children: Not on file    Years of education: Not on file    Highest education level: Not on file   Occupational History    Not on file   Tobacco Use    Smoking status: Never Smoker    Smokeless tobacco: Never Used   Substance and Sexual Activity    Alcohol use: No     Alcohol/week: 0.0 standard drinks    Drug use: No    Sexual activity: Not on file   Other Topics Concern    Not on file   Social History Narrative    Not on file     Social Determinants of Health     Financial Resource Strain: Low Risk     Difficulty of Paying Living Expenses: Not hard at all   Food Insecurity: No Food Insecurity    Worried About [address] [place] [address] in the Last Year: Never true    Tai of Food in the Last Year: Never true   Transportation Needs: No Transportation Needs    Lack of Transportation (Medical): No    Lack of Transportation (Non-Medical):  No   Physical Activity:     Days of Exercise per Week: Not on file    Minutes of Exercise per Session: Not on file   Stress:     Feeling of Stress : Not on file   Social Connections:     Frequency of Communication with Friends and Family: Not on file    Frequency of Social Gatherings with Friends and Family: Not on file    Attends Tenriism Services: Not on file    Active Member of Clubs or Organizations: Not on file    Attends Club or Organization Meetings: Not on file    Marital Status: Not on file   Intimate Partner Violence:     Fear of Current or Ex-Partner: Not on file    Emotionally Abused: Not on file    Physically Abused: Not on file    Sexually Abused: Not on file   Housing Stability:     Unable to Pay for Housing in the Last Year: Not on file    Number of [place] in the Last Year: Not on file    Unstable Housing in the Last Year: Not on file     Family History   Problem Relation Age of Onset    Rheum Arthritis Mother     Osteoarthritis Mother     Hypertension Mother     Cancer Mother         uterine cancer    Other Father         myocardial infarction    Diabetes Father      No current facility-administered medications on file prior to encounter.      Current Outpatient Medications on File Prior to Encounter   Medication Sig Dispense Refill    glipiZIDE (GLUCOTROL) 10 MG tablet Take 1 tablet by mouth 2 times daily 180 tablet 1    lisinopril (PRINIVIL;ZESTRIL) 10 MG tablet TAKE ONE TABLET BY MOUTH DAILY      metFORMIN (GLUCOPHAGE) 500 MG tablet Take 2 tablets by mouth 2 times daily (with meals) 360 tablet 1    pravastatin (PRAVACHOL) 20 MG tablet Take 1 tablet by mouth daily 90 tablet 1    Dextromethorphan-guaiFENesin (CORICIDIN HBP CONGESTION/COUGH)  MG CAPS Take 2 tablets by mouth 4 times daily (Patient not taking: Reported on 10/20/2021) 168 capsule 0    fluticasone (FLONASE) 50 MCG/ACT nasal spray 2 sprays by Each Nostril route daily 1 Bottle 0     Lab Results   Component Value Date    WBC 5.4 12/30/2021    HGB 14.5 12/30/2021    HCT 42.5 12/30/2021    MCV 88.1 12/30/2021     12/30/2021     Lab Results   Component Value Date     12/30/2021    K 4.6 12/30/2021     12/30/2021    CO2 28 12/30/2021    BUN 15 12/30/2021    CREATININE 0.62 12/30/2021    GLUCOSE 228 12/30/2021    CALCIUM 9.9 12/30/2021      ROS: 12 Review of system is negative except was stated in HPI  HEENT: AT/NC, PERRLA, no JVD  HEART: s1/s2 wnl w/o s3, no m.r.g  LUNG: clear, no wheez  ABD: soft, NT, ND, +BS  EXT: no edema  SKin : no rash  Neuro:no focal deficits, CN 2 to 12 grossly intact    1) TIA  2) Dm2 with hyperglycemia  3) HTN  Admit to obs  MRI w/o contrast  Echo  Carotid u/s  [id number]  Lipid panel  Neurology eval  ASA for now  Hydralazine prn

## 2021-12-31 NOTE — ED NOTES
Went to take  Pt his lovenox and  Take a blood sugar. Pt refusing any insulin, states he h has never taken insulin and takes metformin and glipizide. I did explain to the pt that I would make sure his medications are correct in the computer. Pt then stood up and stated \"I'm just gonna leave. \"  Wife asked that I please step out so that she may talk to him alone a moment     April L Agus Subramanian, [facility]  12/31/21 [id number]

## 2022-01-01 LAB
C-REACTIVE PROTEIN: <3 MG/L (ref 0–5)
GLUCOSE BLD-MCNC: 168 MG/DL (ref 60–115)
GLUCOSE BLD-MCNC: 176 MG/DL (ref 60–115)
GLUCOSE BLD-MCNC: 213 MG/DL (ref 60–115)
GLUCOSE BLD-MCNC: 229 MG/DL (ref 60–115)
LV EF: 55 %
LVEF MODALITY: NORMAL
PERFORMED ON: ABNORMAL
SEDIMENTATION RATE, ERYTHROCYTE: 9 MM (ref 0–20)

## 2022-01-01 PROCEDURE — 99233 SBSQ HOSP IP/OBS HIGH 50: CPT | Performed by: PSYCHIATRY & NEUROLOGY

## 2022-01-01 PROCEDURE — 93306 TTE W/DOPPLER COMPLETE: CPT

## 2022-01-01 PROCEDURE — 85302 CLOT INHIBIT PROT C ANTIGEN: CPT

## 2022-01-01 PROCEDURE — 99255 IP/OBS CONSLTJ NEW/EST HI 80: CPT | Performed by: INTERNAL MEDICINE

## 2022-01-01 PROCEDURE — 1210000000 HC MED SURG R&B

## 2022-01-01 PROCEDURE — 6370000000 HC RX 637 (ALT 250 FOR IP): Performed by: INTERNAL MEDICINE

## 2022-01-01 PROCEDURE — APPSS45 APP SPLIT SHARED TIME 31-45 MINUTES: Performed by: STUDENT IN AN ORGANIZED HEALTH CARE EDUCATION/TRAINING PROGRAM

## 2022-01-01 PROCEDURE — 85300 ANTITHROMBIN III ACTIVITY: CPT

## 2022-01-01 PROCEDURE — 6360000002 HC RX W HCPCS: Performed by: INTERNAL MEDICINE

## 2022-01-01 PROCEDURE — 85730 THROMBOPLASTIN TIME PARTIAL: CPT

## 2022-01-01 PROCEDURE — 85652 RBC SED RATE AUTOMATED: CPT

## 2022-01-01 PROCEDURE — 85305 CLOT INHIBIT PROT S TOTAL: CPT

## 2022-01-01 PROCEDURE — 36415 COLL VENOUS BLD VENIPUNCTURE: CPT

## 2022-01-01 PROCEDURE — 86140 C-REACTIVE PROTEIN: CPT

## 2022-01-01 PROCEDURE — 83516 IMMUNOASSAY NONANTIBODY: CPT

## 2022-01-01 PROCEDURE — 85610 PROTHROMBIN TIME: CPT

## 2022-01-01 PROCEDURE — 83090 ASSAY OF HOMOCYSTEINE: CPT

## 2022-01-01 PROCEDURE — 6370000000 HC RX 637 (ALT 250 FOR IP): Performed by: STUDENT IN AN ORGANIZED HEALTH CARE EDUCATION/TRAINING PROGRAM

## 2022-01-01 PROCEDURE — 85613 RUSSELL VIPER VENOM DILUTED: CPT

## 2022-01-01 PROCEDURE — 86147 CARDIOLIPIN ANTIBODY EA IG: CPT

## 2022-01-01 RX ORDER — CLOPIDOGREL BISULFATE 75 MG/1
75 TABLET ORAL DAILY
Status: DISCONTINUED | OUTPATIENT
Start: 2022-01-01 | End: 2022-01-05 | Stop reason: HOSPADM

## 2022-01-01 RX ADMIN — LISINOPRIL 10 MG: 10 TABLET ORAL at 08:20

## 2022-01-01 RX ADMIN — ATORVASTATIN CALCIUM 40 MG: 40 TABLET, FILM COATED ORAL at 20:01

## 2022-01-01 RX ADMIN — ASPIRIN 81 MG: 81 TABLET, COATED ORAL at 08:20

## 2022-01-01 RX ADMIN — GLIPIZIDE 10 MG: 10 TABLET ORAL at 08:22

## 2022-01-01 RX ADMIN — PANTOPRAZOLE SODIUM 40 MG: 40 TABLET, DELAYED RELEASE ORAL at 06:16

## 2022-01-01 RX ADMIN — CLOPIDOGREL BISULFATE 75 MG: 75 TABLET ORAL at 13:14

## 2022-01-01 RX ADMIN — GLIPIZIDE 10 MG: 10 TABLET ORAL at 18:39

## 2022-01-01 RX ADMIN — ENOXAPARIN SODIUM 40 MG: 100 INJECTION SUBCUTANEOUS at 08:20

## 2022-01-01 ASSESSMENT — ENCOUNTER SYMPTOMS
DIARRHEA: 0
NAUSEA: 0
COUGH: 0
COLOR CHANGE: 0
BACK PAIN: 0
CHOKING: 0
PHOTOPHOBIA: 0
SHORTNESS OF BREATH: 0
VOMITING: 0
TROUBLE SWALLOWING: 0

## 2022-01-01 NOTE — PROGRESS NOTES
Progress Note  Date:2022       RNZO:[id number]  Patient Kellee James     YOB: 1970     Age:51 y.o. Subjective    Subjective:  Symptoms:  No shortness of breath, malaise, cough, chest pain, weakness, headache, chest pressure, anorexia, diarrhea or anxiety. Diet:  No nausea or vomiting. Review of Systems   Respiratory: Negative for cough and shortness of breath. Cardiovascular: Negative for chest pain. Gastrointestinal: Negative for anorexia, diarrhea, nausea and vomiting. Neurological: Negative for weakness. Objective         Vitals Last 24 Hours:  TEMPERATURE:  Temp  Av °F (36.7 °C)  Min: 97.5 °F (36.4 °C)  Max: 98.4 °F (36.9 °C)  RESPIRATIONS RANGE: Resp  Av.5  Min: 16  Max: 18  PULSE OXIMETRY RANGE: SpO2  Av.3 %  Min: 96 %  Max: 99 %  PULSE RANGE: Pulse  Av.5  Min: 80  Max: 84  BLOOD PRESSURE RANGE: Systolic (23JFO), APE:186 , Min:125 , JHH:773   ; Diastolic (74TKP), XKQ:37, Min:80, Max:96    I/O (24Hr): No intake or output data in the 24 hours ending 22 1322  Objective:  General Appearance:  Comfortable, well-appearing and in no acute distress. Vital signs: (most recent): Blood pressure (!) 141/96, pulse 81, temperature 97.5 °F (36.4 °C), temperature source Oral, resp. rate 16, height 5' 9\" (1.753 m), weight 240 lb (108.9 kg), SpO2 99 %. HEENT: Normal HEENT exam.    Lungs:  Normal effort. Heart: Normal rate. Abdomen: Abdomen is soft. Bowel sounds are normal.     Pulses: Distal pulses are intact. Pupils:  Pupils are equal, round, and reactive to light. Skin:  Warm.       Labs/Imaging/Diagnostics    Labs:  CBC:  Recent Labs     21  1654 21  0600 21  1842   WBC 5.4 5.0 6.1   RBC 4.82 4.13* 4.36*   HGB 14.5 12.3* 13.0*   HCT 42.5 36.7* 38.5*   MCV 88.1 88.9 88.2   RDW 12.5 12.5 12.3    175 198     CHEMISTRIES:  Recent Labs     21  1654 21  0600 21  1841    139 138   K 4.6 4.0 4.4    104 102   CO2 28 25 24   BUN 15 17 13   CREATININE 0.62* 0.52* 0.56*   GLUCOSE 228* 215* 211*   MG 1.7  --   --      PT/INR:  Recent Labs     12/30/21  1654   PROTIME 13.1   INR 1.0     APTT:  Recent Labs     12/30/21  1654   APTT 33.8     LIVER PROFILE:  Recent Labs     12/30/21  1654 12/31/21  0600 12/31/21  1841   AST 19 15 17   ALT 25 20 22   BILITOT 0.3 <0.2 0.3   ALKPHOS 57 44 47       Imaging Last 24 Hours:  XR CHEST (2 VW)    Result Date: 12/31/2021  EXAMINATION: XR CHEST (2 VW) CLINICAL HISTORY: DYSARTHRIA COMPARISONS: None available. FINDINGS: Osseous structures are intact. Cardiopericardial silhouette is normal. Pulmonary vasculature is normal. Lungs are clear. NO ACUTE CARDIOPULMONARY DISEASE. CT HEAD WO CONTRAST    Result Date: 12/30/2021  EXAMINATION: CT of the brain without contrast HISTORY: Stroke. Dizziness. Slurred speech. COMPARISON: None available TECHNIQUE: Multiple contiguous axial images were obtained of the brain from the skull base through the vertex. Multiplanar reformats were obtained. FINDINGS: Prominence of the sulci and ventricles compatible with mild generalized parenchymal volume loss. Areas of bilateral supratentorial white matter hypoattenuation are nonspecific but most likely related to chronic small vessel ischemic changes in a patient of this age. Gray-white matter differentiation is preserved. No acute hemorrhage or abnormal extra-axial fluid collection. No mass effect or midline shift. Mild paranasal sinus mucosal thickening. The mastoid air cells are clear. Calvarium is intact. Two right frontal scalp lesions measure approximately 10 mm and 15 mm respectively. The posterior left scalp lesion measures approximately 5 mm and a left frontal scalp lesion measures approximately 5 mm. Clinical correlation is recommended. No acute intracranial process.  All CT scans at this facility use dose modulation, iterative reconstruction, and/or weight based dosing when appropriate to reduce radiation dose to as low as reasonably achievable. MRI BRAIN WO CONTRAST    Result Date: 12/31/2021  EXAMINATION:  MRI BRAIN WO CONTRAST HISTORY:  New onset slurred speech TECHNIQUE:  MRI brain routine protocol without contrast. COMPARISON:  CT brain 12/30/2021 RESULT: Acute Change:  Multiple foci of restricted diffusion along the left centrum semiovale, corona radiata and frontal operculum compatible with acute/subacute left MCA distribution infarcts. Hemorrhage:  No evidence of prior parenchymal hemorrhage on the susceptibility weighted sequences. Mass Lesion/ Mass Effect:  No evidence of an intracranial mass or extra-axial fluid collection. No significant mass effect. Chronic Change:  Increased T2/FLAIR signal within the area of acute infarct in the left frontal lobe. Parenchyma:  No significant parenchymal volume loss for age. Ventricles:  Normal caliber and morphology. Skull Base:  Hypothalamic and pituitary region are grossly normal. Craniocervical junction is normal. No significant marrow replacement process. Vasculature:  Major intracranial arteries and dural venous sinuses demonstrate typical flow voids, suggesting patency by spin echo criteria. Other:  Bilateral maxillary sinus mucus retention cysts. Mild paranasal sinus mucosal thickening otherwise. Mastoid air cells are clear. The orbits are unremarkable. Multiple cutaneous soft tissue lesions predominantly along the right face. Multiple foci of left frontal lobe restricted diffusion compatible with acute/subacute Left MCA distribution infarct. No hemorrhage. COMMUNICATION:  Communicated with: Abdullahi Quinn , ER nurse on 12/31/2021 at 12:31 PM 12:31 PM.       US CAROTID ARTERY BILATERAL    Result Date: 12/31/2021  EXAMINATION: US CAROTID ARTERY BILATERAL HISTORY:   TIA vs CVA .  Slurred speech, right-sided weakness COMPARISON:None TECHNIQUE: Carotid duplex sonograms which include gray scale and color flow evaluation are complimented with spectral waveform analysis. Please refer to chart below for specific carotid velocity measurements. The degree of stenosis recorded on this exam uses the same method of stratification used in the NASCET trials. This complies with ACR practice guidelines and the Society of radiology in ultrasound consensus statement and provides adequate information for clinical decision making. Society of Radiologists in Ultrasound (SRU) consensus statement was used to estimate internal carotid artery stenosis. RESULT: There is moderate to severe plaque in the left internal carotid artery, predominantly involving the proximal segment. There is mild plaque in right internal carotid artery. There is turbulent flow at the level of the proximal left internal carotid artery indicating significant stenosis, with high resistant waveforms seen in the mid to distal left internal carotid artery. There is antegrade flow identified in both vertebral arteries. ARTERIAL BLOOD FLOW VELOCITY RIGHT PEAK SYSTOLIC VELOCITIES (PSV)                                               Prox CCA    65 cm/s             Mid CCA     79 cm/s              Dist CCA    56 cm/s           Prox ICA    46 cm/s               Mid ICA     106 cm/s            Dist ICA    81 cm/s             Prox ECA    75 cm/s             Prox VERT   76 cm/s              ICA/CCA     1.3                        LEFT PEAK SYSTOLIC VELOCITIES (PSV)  Prox CCA    101 cm/s Mid CCA     70 cm/s Dist CCA    54 cm/s Prox ICA    31 cm/s Mid ICA     46 cm/s Dist ICA    48 cm/s Prox ECA    131 cm/s Prox VERT   44 cm/s ICA/CCA     0.7     HIGH-GRADE STENOSIS OF THE PROXIMAL LEFT INTERNAL CAROTID ARTERY.   <50  % STENOSIS IN THE RIGHT ICA ANTEGRADE FLOW IN THE BILATERAL VERTEBRAL ARTERIES     Assessment//Plan           Hospital Problems           Last Modified POA    TIA (transient ischemic attack) 12/30/2021 Yes    Acute CVA (cerebrovascular accident) ([address] 12/31/2021 Yes      Carotid vascular dz    Assessment & Plan    12/31: Follow-up MRI, follow-up official report of CT angio head and neck. Patient has carotid vascular disease. Hemoglobin A1c is 9 counseling was given, made patient inpatient as per physician advisor recommendation. FU neuro recommendation  1/1: Patient was seen and evaluated. Vascular surgery is consulted for carodi vascular dz with CVA, FU cathleen, no overnight events, happy with the care.   Electronically signed by Glenn Gruber MD on 12/31/21 at 1:05 PM EST

## 2022-01-01 NOTE — CARE COORDINATION
[facility] Case Management Initial Discharge Assessment    Met with Patient to discuss discharge plan. PCP: SALTY Reynaga - [facility]                                Date of Last Visit: A FEW MONTHS    If no PCP, list provided? N/A    Discharge Planning    Living Arrangements: independently at home    Who do you live with? SPOUSE    Who helps you with your care:  self    If lives at home:     Do you have any barriers navigating in your home? no    Patient can perform ADL? Yes    Current Services (outpatient and in home) :  None    Dialysis: No    Is transportation available to get to your appointments? Yes    DME Equipment:  no    Respiratory equipment: None    Respiratory provider:  no     Pharmacy:  yes - [facility] with Medication Assistance Program?  No      Patient agreeable to [place] [id number]? Declined    Patient agreeable to SNF/Rehab? Declined    Other discharge needs identified? N/A    Does Patient Have a High-Risk for Readmission Diagnosis (CHF, PN, MI, COPD)? No        Initial Discharge Plan? (Note: please see concurrent daily documentation for any updates after initial note). RETURN HOME W/SPOUSE. DENIES NEEDS. PT DOES NOT HAVE RX COVERAGE/INSURANCE BUT STATES HE IS   ABLE TO AFFORD HIS MEDICATIONS. WILL CONTINUE TO FOLLOW FOR NEEDS.     Readmission Risk              Risk of Unplanned Readmission:  9         Electronically signed by Judah Gilliam RN on 1/1/2022 at 11:15 AM

## 2022-01-01 NOTE — PROGRESS NOTES
Subjective:      Patient seen and examined for neurology follow up for acute/subacute left multifocal frontal lobe CVA in the distribution of the left CVA. No deficits on exam.     Patient is alert and oriented x 3, cooperative, and in no acute distress. Dicussed MRI results in detail and made patient aware of plan. All questions answered. Patient does not have any deficits on exam and denies headache, visual field deficit, weakness, paresthesias, or trouble swallowing. Review of Systems   Constitutional: Negative for fever. HENT: Negative for ear pain, tinnitus and trouble swallowing. Eyes: Negative for photophobia and visual disturbance. Respiratory: Negative for choking and shortness of breath. Cardiovascular: Negative for chest pain and palpitations. Gastrointestinal: Negative for nausea and vomiting. Musculoskeletal: Negative for back pain, gait problem, joint swelling, myalgias, neck pain and neck stiffness. Skin: Negative for color change. Allergic/Immunologic: Negative for food allergies. Neurological: Negative for dizziness, tremors, seizures, syncope, facial asymmetry, speech difficulty, weakness, light-headedness, numbness and headaches. Psychiatric/Behavioral: Negative for behavioral problems, confusion, hallucinations and sleep disturbance. Patient remains mostly asymptomatic  Past Medical History:   Diagnosis Date    Hypertension     Type 2 diabetes mellitus ([facility] [address]      History reviewed. No pertinent surgical history.   Social History     Socioeconomic History    Marital status: Single     Spouse name: Not on file    Number of children: Not on file    Years of education: Not on file    Highest education level: Not on file   Occupational History    Not on file   Tobacco Use    Smoking status: Never Smoker    Smokeless tobacco: Never Used   Substance and Sexual Activity    Alcohol use: No     Alcohol/week: 0.0 standard drinks    Drug use: No    Sexual activity: Not on file Other Topics Concern    Not on file   Social History Narrative    Not on file     Social Determinants of Health     Financial Resource Strain: Low Risk     Difficulty of Paying Living Expenses: Not hard at all   Food Insecurity: No Food Insecurity    Worried About [address] in the Last Year: Never true    [address] [facility] [address] in the Last Year: Never true   Transportation Needs: No Transportation Needs    Lack of Transportation (Medical): No    Lack of Transportation (Non-Medical):  No   Physical Activity:     Days of Exercise per Week: Not on file    Minutes of Exercise per Session: Not on file   Stress:     Feeling of Stress : Not on file   Social Connections:     Frequency of Communication with Friends and Family: Not on file    Frequency of Social Gatherings with Friends and Family: Not on file    Attends Scientologist Services: Not on file    Active Member of Clubs or Organizations: Not on file    Attends Club or Organization Meetings: Not on file    Marital Status: Not on file   Intimate Partner Violence:     Fear of Current or Ex-Partner: Not on file    Emotionally Abused: Not on file    Physically Abused: Not on file    Sexually Abused: Not on file   Housing Stability:     Unable to Pay for Housing in the Last Year: Not on file    Number of [place] in the Last Year: Not on file    Unstable Housing in the Last Year: Not on file     Family History   Problem Relation Age of Onset    Rheum Arthritis Mother     Osteoarthritis Mother     Hypertension Mother     Cancer Mother         uterine cancer    Other Father         myocardial infarction    Diabetes Father      Allergies   Allergen Reactions    Penicillins Hives and Swelling     Current Facility-Administered Medications   Medication Dose Route Frequency Provider Last Rate Last Admin    [Held by provider] metFORMIN (GLUCOPHAGE) tablet 750 mg  750 mg Oral TID Nuzhat Polk MD        glipiZIDE (GLUCOTROL) tablet 10 mg  10 mg Oral BID AC Nuzhat Polk MD 10 mg at 01/01/22 3353    lisinopril (PRINIVIL;ZESTRIL) tablet 10 mg  10 mg Oral Daily Blake Epley, MD   10 mg at 01/01/22 0820    insulin lispro (HUMALOG) injection vial 0-6 Units  0-6 Units SubCUTAneous TID WC Blake Epley, MD        insulin lispro (HUMALOG) injection vial 0-3 Units  0-3 Units SubCUTAneous Nightly Blake Epley, MD        hydrALAZINE (APRESOLINE) injection 10 mg  10 mg IntraVENous Q6H PRN Blake Epley, MD        aspirin EC tablet 81 mg  81 mg Oral Daily Blake Epley, MD   81 mg at 01/01/22 0820    atorvastatin (LIPITOR) tablet 40 mg  40 mg Oral Nightly Blake Epley, MD   40 mg at 12/31/21 2314    pantoprazole (PROTONIX) tablet 40 mg  40 mg Oral QAM AC Blake Epley, MD   40 mg at 01/01/22 0616    enoxaparin (LOVENOX) injection 40 mg  40 mg SubCUTAneous Daily Blake Epley, MD   40 mg at 01/01/22 0820        Objective:   BP (!) 141/96   Pulse 81   Temp 97.5 °F (36.4 °C) (Oral)   Resp 16   Ht 5' 9\" (1.753 m)   Wt 240 lb (108.9 kg)   SpO2 99%   BMI 35.44 kg/m²     Physical Exam  Vitals reviewed. Eyes:      Pupils: Pupils are equal, round, and reactive to light. Cardiovascular:      Rate and Rhythm: Normal rate and regular rhythm. Heart sounds: No murmur heard. Pulmonary:      Effort: Pulmonary effort is normal.      Breath sounds: Normal breath sounds. Abdominal:      General: Bowel sounds are normal.   Musculoskeletal:         General: Normal range of motion. Cervical back: Normal range of motion. Skin:     General: Skin is warm. Neurological:      Mental Status: He is alert and oriented to person, place, and time. Cranial Nerves: No cranial nerve deficit. Sensory: No sensory deficit. Motor: No abnormal muscle tone. Coordination: Coordination normal.      Deep Tendon Reflexes: Reflexes are normal and symmetric. Babinski sign absent on the right side. Babinski sign absent on the left side.    Psychiatric:         Mood and Affect: Mood normal. Weakness and speech difficulty has resolved. Exam unchanged  Exam nonfocal.    XR CHEST (2 VW)    Result Date: 12/31/2021  EXAMINATION: XR CHEST (2 VW) CLINICAL HISTORY: DYSARTHRIA COMPARISONS: None available. FINDINGS: Osseous structures are intact. Cardiopericardial silhouette is normal. Pulmonary vasculature is normal. Lungs are clear. NO ACUTE CARDIOPULMONARY DISEASE. CT HEAD WO CONTRAST    Result Date: 12/30/2021  EXAMINATION: CT of the brain without contrast HISTORY: Stroke. Dizziness. Slurred speech. COMPARISON: None available TECHNIQUE: Multiple contiguous axial images were obtained of the brain from the skull base through the vertex. Multiplanar reformats were obtained. FINDINGS: Prominence of the sulci and ventricles compatible with mild generalized parenchymal volume loss. Areas of bilateral supratentorial white matter hypoattenuation are nonspecific but most likely related to chronic small vessel ischemic changes in a patient of this age. Gray-white matter differentiation is preserved. No acute hemorrhage or abnormal extra-axial fluid collection. No mass effect or midline shift. Mild paranasal sinus mucosal thickening. The mastoid air cells are clear. Calvarium is intact. Two right frontal scalp lesions measure approximately 10 mm and 15 mm respectively. The posterior left scalp lesion measures approximately 5 mm and a left frontal scalp lesion measures approximately 5 mm. Clinical correlation is recommended. No acute intracranial process. All CT scans at this facility use dose modulation, iterative reconstruction, and/or weight based dosing when appropriate to reduce radiation dose to as low as reasonably achievable. US CAROTID ARTERY BILATERAL    Result Date: 12/31/2021  EXAMINATION: US CAROTID ARTERY BILATERAL HISTORY:   TIA vs CVA .  Slurred speech, right-sided weakness COMPARISON:None TECHNIQUE: Carotid duplex sonograms which include gray scale and color flow evaluation are complimented with spectral waveform analysis. Please refer to chart below for specific carotid velocity measurements. The degree of stenosis recorded on this exam uses the same method of stratification used in the NASCET trials. This complies with ACR practice guidelines and the Society of radiology in ultrasound consensus statement and provides adequate information for clinical decision making. Society of Radiologists in Ultrasound (SRU) consensus statement was used to estimate internal carotid artery stenosis. RESULT: There is moderate to severe plaque in the left internal carotid artery, predominantly involving the proximal segment. There is mild plaque in right internal carotid artery. There is turbulent flow at the level of the proximal left internal carotid artery indicating significant stenosis, with high resistant waveforms seen in the mid to distal left internal carotid artery. There is antegrade flow identified in both vertebral arteries. ARTERIAL BLOOD FLOW VELOCITY RIGHT PEAK SYSTOLIC VELOCITIES (PSV)                                               Prox CCA    65 cm/s             Mid CCA     79 cm/s              Dist CCA    56 cm/s           Prox ICA    46 cm/s               Mid ICA     106 cm/s            Dist ICA    81 cm/s             Prox ECA    75 cm/s             Prox VERT   76 cm/s              ICA/CCA     1.3                        LEFT PEAK SYSTOLIC VELOCITIES (PSV)  Prox CCA    101 cm/s Mid CCA     70 cm/s Dist CCA    54 cm/s Prox ICA    31 cm/s Mid ICA     46 cm/s Dist ICA    48 cm/s Prox ECA    131 cm/s Prox VERT   44 cm/s ICA/CCA     0.7     HIGH-GRADE STENOSIS OF THE PROXIMAL LEFT INTERNAL CAROTID ARTERY.   <50  % STENOSIS IN THE RIGHT ICA ANTEGRADE FLOW IN THE BILATERAL VERTEBRAL ARTERIES       Lab Results   Component Value Date    WBC 6.1 12/31/2021    RBC 4.36 12/31/2021    HGB 13.0 12/31/2021    HCT 38.5 12/31/2021    MCV 88.2 12/31/2021    MCH 29.8 12/31/2021    MCHC 33.8 12/31/2021 RDW 12.3 12/31/2021     12/31/2021     Lab Results   Component Value Date     12/31/2021    K 4.4 12/31/2021     12/31/2021    CO2 24 12/31/2021    BUN 13 12/31/2021    CREATININE 0.56 12/31/2021    GFRAA >60.0 12/31/2021    LABGLOM >60.0 12/31/2021    GLUCOSE 211 12/31/2021    PROT 6.8 12/31/2021    LABALBU 3.8 12/31/2021    CALCIUM 9.0 12/31/2021    BILITOT 0.3 12/31/2021    ALKPHOS 47 12/31/2021    AST 17 12/31/2021    ALT 22 12/31/2021     Lab Results   Component Value Date    PROTIME 13.1 12/30/2021    INR 1.0 12/30/2021     Lab Results   Component Value Date    TSH 1.340 05/11/2021     Lab Results   Component Value Date    TRIG 170 12/30/2021    HDL 40 12/30/2021    LDLCALC 53 12/30/2021     No results found for: Layman Screen, CANNAB, COCAINESCRN, LABMETH, OPIATESCREENURINE, PHENCYCLIDINESCREENURINE, PPXUR, ETOH  No results found for: LITHIUM, DILFRTOT, VALPROATE    Assessment:   Left cerebral stroke with subtle right-sided findings with word finding difficulty. Patient has multiple risk factors for cerebrovascular disease. 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 Patient was not on antiplatelet agent. I recommended that we start him on splint for now and Lipitor and complete his work-up with a MRI of the brain and carotids. 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  We will hold off the Plavix for now as he may require urgent intervention. MRI of the brain showed multifocal acute/subacute CVA in left MCA distribution. CTA of the neck showed diffuse stenosis of the left internal carotid artery. Approximately 60% stenosis of the proximal internal carotid artery.    U/s of the carotid artery also showed high grade stenosis in the proximal left internal carotid artery. CTA of the head showed mild diffuse narrowing of the distal left internal carotid artery. ECHO with bubble study has not been completed. Will obtain hypercoagulable work up given patient's age. Will obtain work up for vasculitis given concern. A1c 9  LDL 53  Will add Plavix for DAPT given the severity of stenosis. Will order MEERA with bubble study and cardiology consult for PFO given age. Will consult Dr. Asif Harvey regarding left internal carotid stenosis. Likely not a candidate for endarterectomy given location in internal carotid artery, but Dr. Rodriguez Tran would like to discuss with him. I have personally performed a face to face diagnostic evaluation on this patient, reviewed all data and investigations, and am the sole provider of all clinical decisions on the neurological status of this patient. Exam remains unchanged and mostly nonfocal.  Patient has a complicated carotid stenosis which is intra and extracranial though this could be a flow artifact and ulceration is still a consideration. Patient is a high risk for continued strokes and therefore patient will require an angiogram which we have discussed and this will be done Tuesday. Patient should undergo a transesophageal echo as well given his age for strokes. Findings discussed with Dr. Asif Harvey and the patient      Jose C Tran MD, Maggie Price, American Board of Psychiatry & Neurology  Board Certified in Vascular Neurology  Board Certified in Neuromuscular Medicine  Certified in [facility] [address]:

## 2022-01-01 NOTE — CONSULTS
Consult Note  Patient: Margarito Field  Unit/Bed: E090/H507-67  YOB: 1970  MRN: [id number]  Acct: [de-identified]   Admitting Diagnosis: TIA (transient ischemic attack) [G45.9]  Acute CVA (cerebrovascular accident) [facility]) [I63.9]  Date:  12/30/2021  Hospital Day: 1      Chief Complaint:  Multi focal frontal lobe CVA in the distribution of left CVA. No definite acute deficits on exam grossly. History of Present Illness:  Patient admitted with left multifocal frontal lobe CVA. Also has significant left carotid stenosis.  Because of MRI findings a MEERA was requested    Allergies   Allergen Reactions    Penicillins Hives and Swelling       Current Facility-Administered Medications   Medication Dose Route Frequency Provider Last Rate Last Admin    clopidogrel (PLAVIX) tablet 75 mg  75 mg Oral Daily Bertha Navarro PA-C   75 mg at 01/01/22 1314    [Held by provider] metFORMIN (GLUCOPHAGE) tablet 750 mg  750 mg Oral TID Moshe Carlisle MD        glipiZIDE (GLUCOTROL) tablet 10 mg  10 mg Oral BID AC Moshe Carlisle MD   10 mg at 01/01/22 6039    lisinopril (PRINIVIL;ZESTRIL) tablet 10 mg  10 mg Oral Daily Moshe Carlisle MD   10 mg at 01/01/22 0820    insulin lispro (HUMALOG) injection vial 0-6 Units  0-6 Units SubCUTAneous TID WC Moshe Carlisle MD        insulin lispro (HUMALOG) injection vial 0-3 Units  0-3 Units SubCUTAneous Nightly Moshe Carlisle MD        hydrALAZINE (APRESOLINE) injection 10 mg  10 mg IntraVENous Q6H PRN Moshe Carlisle MD        aspirin EC tablet 81 mg  81 mg Oral Daily Moshe Carlisle MD   81 mg at 01/01/22 0820    atorvastatin (LIPITOR) tablet 40 mg  40 mg Oral Nightly Moshe Carlisle MD   40 mg at 12/31/21 2314    pantoprazole (PROTONIX) tablet 40 mg  40 mg Oral QAM AC Moshe Carlisle MD   40 mg at 01/01/22 0616    enoxaparin (LOVENOX) injection 40 mg  40 mg SubCUTAneous Daily Moshe Carlisle MD   40 mg at 01/01/22 0820       PMHx:  Past Medical History:   Diagnosis Date    Hypertension     Type 2 diabetes mellitus (HCC)        PSHx:  History reviewed. No pertinent surgical history. Social Hx:  Social History     Socioeconomic History    Marital status: Single     Spouse name: None    Number of children: None    Years of education: None    Highest education level: None   Occupational History    None   Tobacco Use    Smoking status: Never Smoker    Smokeless tobacco: Never Used   Substance and Sexual Activity    Alcohol use: No     Alcohol/week: 0.0 standard drinks    Drug use: No    Sexual activity: None   Other Topics Concern    None   Social History Narrative    None     Social Determinants of Health     Financial Resource Strain: Low Risk     Difficulty of Paying Living Expenses: Not hard at all   Food Insecurity: No Food Insecurity    Worried About Running Out of Food in the Last Year: Never true    Tai of Food in the Last Year: Never true   Transportation Needs: No Transportation Needs    Lack of Transportation (Medical): No    Lack of Transportation (Non-Medical):  No   Physical Activity:     Days of Exercise per Week: Not on file    Minutes of Exercise per Session: Not on file   Stress:     Feeling of Stress : Not on file   Social Connections:     Frequency of Communication with Friends and Family: Not on file    Frequency of Social Gatherings with Friends and Family: Not on file    Attends Mandaen Services: Not on file    Active Member of Clubs or Organizations: Not on file    Attends Club or Organization Meetings: Not on file    Marital Status: Not on file   Intimate Partner Violence:     Fear of Current or Ex-Partner: Not on file    Emotionally Abused: Not on file    Physically Abused: Not on file    Sexually Abused: Not on file   Housing Stability:     Unable to Pay for Housing in the Last Year: Not on file    Number of [place] in the Last Year: Not on file    Unstable Housing in the Last Year: Not on file       Family Hx:  Family History   Problem Relation Age of Onset    Rheum Arthritis Mother     Osteoarthritis Mother     Hypertension Mother     Cancer Mother         uterine cancer    Other Father         myocardial infarction    Diabetes Father        Review of Systems:   Review of Systems   Neurological: Positive for facial asymmetry and numbness. Physical Examination:    BP (!) 141/96   Pulse 81   Temp 97.5 °F (36.4 °C) (Oral)   Resp 16   Ht 5' 9\" (1.753 m)   Wt 240 lb (108.9 kg)   SpO2 99%   BMI 35.44 kg/m²    Physical Exam  Constitutional:       Appearance: He is well-developed. HENT:      Head: Atraumatic. Eyes:      Conjunctiva/sclera: Conjunctivae normal.      Pupils: Pupils are equal, round, and reactive to light. Neck:      Thyroid: No thyromegaly. Vascular: No JVD. Trachea: No tracheal deviation. Cardiovascular:      Rate and Rhythm: Normal rate and regular rhythm. Heart sounds: No murmur heard. No friction rub. No gallop. Pulmonary:      Effort: No respiratory distress. Breath sounds: No wheezing or rales. Chest:      Chest wall: No tenderness. Abdominal:      General: There is no distension. Palpations: Abdomen is soft. There is no mass. Tenderness: There is no abdominal tenderness. There is no guarding or rebound. Musculoskeletal:         General: Normal range of motion. Lymphadenopathy:      Cervical: No cervical adenopathy. Skin:     General: Skin is warm. Neurological:      Mental Status: He is alert and oriented to person, place, and time.          LABS:  CBC:  Lab Results   Component Value Date    WBC 6.1 12/31/2021    RBC 4.36 12/31/2021    HGB 13.0 12/31/2021    HCT 38.5 12/31/2021    MCV 88.2 12/31/2021    MCH 29.8 12/31/2021    MCHC 33.8 12/31/2021    RDW 12.3 12/31/2021     12/31/2021     CBC with Differential:   Lab Results   Component Value Date    WBC 6.1 12/31/2021    RBC 4.36 12/31/2021    HGB 13.0 12/31/2021    HCT 38.5 12/31/2021     12/31/2021    MCV 88.2 12/31/2021    MCH 29.8 12/31/2021    MCHC 33.8 12/31/2021    RDW 12.3 12/31/2021    LYMPHOPCT 24.8 12/31/2021    MONOPCT 11.5 12/31/2021    BASOPCT 0.4 12/31/2021    MONOSABS 0.7 12/31/2021    LYMPHSABS 1.5 12/31/2021    EOSABS 0.2 12/31/2021    BASOSABS 0.0 12/31/2021     CMP:    Lab Results   Component Value Date     12/31/2021    K 4.4 12/31/2021     12/31/2021    CO2 24 12/31/2021    BUN 13 12/31/2021    CREATININE 0.56 12/31/2021    GFRAA >60.0 12/31/2021    LABGLOM >60.0 12/31/2021    GLUCOSE 211 12/31/2021    PROT 6.8 12/31/2021    LABALBU 3.8 12/31/2021    CALCIUM 9.0 12/31/2021    BILITOT 0.3 12/31/2021    ALKPHOS 47 12/31/2021    AST 17 12/31/2021    ALT 22 12/31/2021     BMP:    Lab Results   Component Value Date     12/31/2021    K 4.4 12/31/2021     12/31/2021    CO2 24 12/31/2021    BUN 13 12/31/2021    LABALBU 3.8 12/31/2021    CREATININE 0.56 12/31/2021    CALCIUM 9.0 12/31/2021    GFRAA >60.0 12/31/2021    LABGLOM >60.0 12/31/2021    GLUCOSE 211 12/31/2021     Magnesium:    Lab Results   Component Value Date    MG 1.7 12/30/2021     Troponin:    Lab Results   Component Value Date    TROPONINI <0.010 12/30/2021       Radiology:  CTA HEAD W WO CONTRAST    Result Date: 12/31/2021  EXAM: CTA NECK W AND OR WO CONTRAST EXAM: CTA HEAD W AND OR WO CONTRAST INDICATION:   high grade carotid stenosis COMPARISON: Same day bilateral carotid ultrasounds and MR brain. TECHNIQUE: Helically acquired axial images were obtained from the aortic arch through the vertex at 7.10 mm slice thickness. Sagittal and coronal reconstructed images were generated. Subsequent MIP images were rendered using a separate 3-D workstation. All CT scans at this facility use dose modulation, iterative reconstruction, and/or weight based dosing when appropriate to reduce radiation dose to as low as reasonably achievable.  CONTRAST: A total of 100 mL of Isovue-300 was given intravenously. FINDINGS: The vertebral arteries have normal course, contour, and caliber throughout the cervical portions. The vertebrobasilar system is normal.  Normal contrast enhancement is present within both vertebral arteries, the basilar artery, and both posterior cerebral arteries. The common carotid arteries have normal course, contour, and caliber. The external carotid arteries have normal contrast enhancement. There are noncalcified atherosclerotic plaques involving the left carotid bulb and the proximal internal carotid artery extending to the cervical portions, with resultant diffuse stenosis of the internal carotid artery. There is approximately 60% stenosis of the proximal internal carotid artery. There is mild diffuse narrowing of the distal left internal carotid artery, involving the distal cervical, petrous, cavernous and supraclinoid portions, resulting in less than 50% stenosis. There is no significant stenosis or occlusion of the right internal carotid artery and the right distal internal carotid artery. There is normal flow signal in the A1 and A2 segments bilaterally. The M1 and M2 segments also have normal flow signal bilaterally. There is no focal stenosis, occlusion, or aneurysm demonstrated in the head. There are multiple soft tissue scalp nodules. There is partial opacification of the bilateral maxillary and bilateral ethmoid air cells. The mastoids air cells are well aerated. The visualized orbits are unremarkable. The known left MCA infarcts are better visualized on the same day MR brain. There are scattered degenerative changes of the cervical spine. The upper airway is patent. CTA Head 1. Mild diffuse narrowing of the distal left internal carotid artery. CTA Neck 1. There are noncalcified atherosclerotic plaques resulting in diffuse narrowing of the left internal carotid artery extending to the cranially, with approximately 60% stenosis at the proximal segment.  However, inflammation/vasculitis may result in similar findings. XR CHEST (2 VW)    Result Date: 12/31/2021  EXAMINATION: XR CHEST (2 VW) CLINICAL HISTORY: DYSARTHRIA COMPARISONS: None available. FINDINGS: Osseous structures are intact. Cardiopericardial silhouette is normal. Pulmonary vasculature is normal. Lungs are clear. NO ACUTE CARDIOPULMONARY DISEASE. CT HEAD WO CONTRAST    Result Date: 12/30/2021  EXAMINATION: CT of the brain without contrast HISTORY: Stroke. Dizziness. Slurred speech. COMPARISON: None available TECHNIQUE: Multiple contiguous axial images were obtained of the brain from the skull base through the vertex. Multiplanar reformats were obtained. FINDINGS: Prominence of the sulci and ventricles compatible with mild generalized parenchymal volume loss. Areas of bilateral supratentorial white matter hypoattenuation are nonspecific but most likely related to chronic small vessel ischemic changes in a patient of this age. Gray-white matter differentiation is preserved. No acute hemorrhage or abnormal extra-axial fluid collection. No mass effect or midline shift. Mild paranasal sinus mucosal thickening. The mastoid air cells are clear. Calvarium is intact. Two right frontal scalp lesions measure approximately 10 mm and 15 mm respectively. The posterior left scalp lesion measures approximately 5 mm and a left frontal scalp lesion measures approximately 5 mm. Clinical correlation is recommended. No acute intracranial process. All CT scans at this facility use dose modulation, iterative reconstruction, and/or weight based dosing when appropriate to reduce radiation dose to as low as reasonably achievable. CTA NECK W WO CONTRAST    Result Date: 12/31/2021  EXAM: CTA NECK W AND OR WO CONTRAST EXAM: CTA HEAD W AND OR WO CONTRAST INDICATION:   high grade carotid stenosis COMPARISON: Same day bilateral carotid ultrasounds and MR brain.  TECHNIQUE: Helically acquired axial images were obtained from the aortic arch through the vertex at 3.50 mm slice thickness. Sagittal and coronal reconstructed images were generated. Subsequent MIP images were rendered using a separate 3-D workstation. All CT scans at this facility use dose modulation, iterative reconstruction, and/or weight based dosing when appropriate to reduce radiation dose to as low as reasonably achievable. CONTRAST: A total of 100 mL of Isovue-300 was given intravenously. FINDINGS: The vertebral arteries have normal course, contour, and caliber throughout the cervical portions. The vertebrobasilar system is normal.  Normal contrast enhancement is present within both vertebral arteries, the basilar artery, and both posterior cerebral arteries. The common carotid arteries have normal course, contour, and caliber. The external carotid arteries have normal contrast enhancement. There are noncalcified atherosclerotic plaques involving the left carotid bulb and the proximal internal carotid artery extending to the cervical portions, with resultant diffuse stenosis of the internal carotid artery. There is approximately 60% stenosis of the proximal internal carotid artery. There is mild diffuse narrowing of the distal left internal carotid artery, involving the distal cervical, petrous, cavernous and supraclinoid portions, resulting in less than 50% stenosis. There is no significant stenosis or occlusion of the right internal carotid artery and the right distal internal carotid artery. There is normal flow signal in the A1 and A2 segments bilaterally. The M1 and M2 segments also have normal flow signal bilaterally. There is no focal stenosis, occlusion, or aneurysm demonstrated in the head. There are multiple soft tissue scalp nodules. There is partial opacification of the bilateral maxillary and bilateral ethmoid air cells. The mastoids air cells are well aerated. The visualized orbits are unremarkable.  The known left MCA infarcts are better visualized on the same day MR brain. There are scattered degenerative changes of the cervical spine. The upper airway is patent. CTA Head 1. Mild diffuse narrowing of the distal left internal carotid artery. CTA Neck 1. There are noncalcified atherosclerotic plaques resulting in diffuse narrowing of the left internal carotid artery extending to the cranially, with approximately 60% stenosis at the proximal segment. However, inflammation/vasculitis may result in similar findings. MRI BRAIN WO CONTRAST    Result Date: 12/31/2021  EXAMINATION:  MRI BRAIN WO CONTRAST HISTORY:  New onset slurred speech TECHNIQUE:  MRI brain routine protocol without contrast. COMPARISON:  CT brain 12/30/2021 RESULT: Acute Change:  Multiple foci of restricted diffusion along the left centrum semiovale, corona radiata and frontal operculum compatible with acute/subacute left MCA distribution infarcts. Hemorrhage:  No evidence of prior parenchymal hemorrhage on the susceptibility weighted sequences. Mass Lesion/ Mass Effect:  No evidence of an intracranial mass or extra-axial fluid collection. No significant mass effect. Chronic Change:  Increased T2/FLAIR signal within the area of acute infarct in the left frontal lobe. Parenchyma:  No significant parenchymal volume loss for age. Ventricles:  Normal caliber and morphology. Skull Base:  Hypothalamic and pituitary region are grossly normal. Craniocervical junction is normal. No significant marrow replacement process. Vasculature:  Major intracranial arteries and dural venous sinuses demonstrate typical flow voids, suggesting patency by spin echo criteria. Other:  Bilateral maxillary sinus mucus retention cysts. Mild paranasal sinus mucosal thickening otherwise. Mastoid air cells are clear. The orbits are unremarkable. Multiple cutaneous soft tissue lesions predominantly along the right face.      Multiple foci of left frontal lobe restricted diffusion compatible with acute/subacute Left MCA distribution infarct. No hemorrhage. COMMUNICATION:  Communicated with: TESS Dejesus nurse on 12/31/2021 at 12:31 PM 12:31 PM.       US CAROTID ARTERY BILATERAL    Result Date: 12/31/2021  EXAMINATION: US CAROTID ARTERY BILATERAL HISTORY:   TIA vs CVA . Slurred speech, right-sided weakness COMPARISON:None TECHNIQUE: Carotid duplex sonograms which include gray scale and color flow evaluation are complimented with spectral waveform analysis. Please refer to chart below for specific carotid velocity measurements. The degree of stenosis recorded on this exam uses the same method of stratification used in the NASCET trials. This complies with ACR practice guidelines and the Society of radiology in ultrasound consensus statement and provides adequate information for clinical decision making. Society of Radiologists in Ultrasound (SRU) consensus statement was used to estimate internal carotid artery stenosis. RESULT: There is moderate to severe plaque in the left internal carotid artery, predominantly involving the proximal segment. There is mild plaque in right internal carotid artery. There is turbulent flow at the level of the proximal left internal carotid artery indicating significant stenosis, with high resistant waveforms seen in the mid to distal left internal carotid artery. There is antegrade flow identified in both vertebral arteries.  ARTERIAL BLOOD FLOW VELOCITY RIGHT PEAK SYSTOLIC VELOCITIES (PSV)                                               Prox CCA    65 cm/s             Mid CCA     79 cm/s              Dist CCA    56 cm/s           Prox ICA    46 cm/s               Mid ICA     106 cm/s            Dist ICA    81 cm/s             Prox ECA    75 cm/s             Prox VERT   76 cm/s              ICA/CCA     1.3                        LEFT PEAK SYSTOLIC VELOCITIES (PSV)  Prox CCA    101 cm/s Mid CCA     70 cm/s Dist CCA    54 cm/s Prox ICA    31 cm/s Mid ICA     46 cm/s Dist ICA    48 cm/s Prox ECA 131 cm/s Prox VERT   44 cm/s ICA/CCA     0.7     HIGH-GRADE STENOSIS OF THE PROXIMAL LEFT INTERNAL CAROTID ARTERY. <50  % STENOSIS IN THE RIGHT ICA ANTEGRADE FLOW IN THE BILATERAL VERTEBRAL ARTERIES        EKG:  Assessment:    Active Hospital Problems    Diagnosis Date Noted    Acute CVA (cerebrovascular accident) ([facility] [address] [I63.9] 12/31/2021    TIA (transient ischemic attack) [G45.9] 12/30/2021         Significant stenosis of proximal left internal carotid artery       Plan:  1.  Plan for MEERA on Monday at 9:00 with             Electronically signed by Mohamud Menard MD on 1/1/2022 at 1:56 PM

## 2022-01-01 NOTE — ED NOTES
Report called to Rozina John on [address] [facility] 9[facility]  12/31/21 7967

## 2022-01-02 LAB
ALBUMIN SERPL-MCNC: 3.8 G/DL (ref 3.5–4.6)
ALP BLD-CCNC: 44 U/L (ref 35–104)
ALT SERPL-CCNC: 21 U/L (ref 0–41)
ANION GAP SERPL CALCULATED.3IONS-SCNC: 12 MEQ/L (ref 9–15)
AST SERPL-CCNC: 14 U/L (ref 0–40)
BASOPHILS ABSOLUTE: 0 K/UL (ref 0–0.2)
BASOPHILS RELATIVE PERCENT: 0.6 %
BILIRUB SERPL-MCNC: 0.4 MG/DL (ref 0.2–0.7)
BUN BLDV-MCNC: 18 MG/DL (ref 6–20)
CALCIUM SERPL-MCNC: 9.2 MG/DL (ref 8.5–9.9)
CHLORIDE BLD-SCNC: 103 MEQ/L (ref 95–107)
CO2: 25 MEQ/L (ref 20–31)
CREAT SERPL-MCNC: 0.55 MG/DL (ref 0.7–1.2)
EOSINOPHILS ABSOLUTE: 0.2 K/UL (ref 0–0.7)
EOSINOPHILS RELATIVE PERCENT: 2.4 %
GFR AFRICAN AMERICAN: >60
GFR NON-AFRICAN AMERICAN: >60
GLOBULIN: 3 G/DL (ref 2.3–3.5)
GLUCOSE BLD-MCNC: 152 MG/DL (ref 60–115)
GLUCOSE BLD-MCNC: 170 MG/DL (ref 60–115)
GLUCOSE BLD-MCNC: 183 MG/DL (ref 60–115)
GLUCOSE BLD-MCNC: 190 MG/DL (ref 60–115)
GLUCOSE BLD-MCNC: 191 MG/DL (ref 70–99)
HCT VFR BLD CALC: 39.2 % (ref 42–52)
HEMOGLOBIN: 13.3 G/DL (ref 14–18)
LYMPHOCYTES ABSOLUTE: 1.6 K/UL (ref 1–4.8)
LYMPHOCYTES RELATIVE PERCENT: 23.9 %
MCH RBC QN AUTO: 29.8 PG (ref 27–31.3)
MCHC RBC AUTO-ENTMCNC: 33.9 % (ref 33–37)
MCV RBC AUTO: 87.9 FL (ref 80–100)
MONOCYTES ABSOLUTE: 0.7 K/UL (ref 0.2–0.8)
MONOCYTES RELATIVE PERCENT: 10.7 %
NEUTROPHILS ABSOLUTE: 4.2 K/UL (ref 1.4–6.5)
NEUTROPHILS RELATIVE PERCENT: 62.4 %
PDW BLD-RTO: 12.5 % (ref 11.5–14.5)
PERFORMED ON: ABNORMAL
PLATELET # BLD: 193 K/UL (ref 130–400)
POTASSIUM SERPL-SCNC: 4.2 MEQ/L (ref 3.4–4.9)
RBC # BLD: 4.46 M/UL (ref 4.7–6.1)
SODIUM BLD-SCNC: 140 MEQ/L (ref 135–144)
TOTAL PROTEIN: 6.8 G/DL (ref 6.3–8)
WBC # BLD: 6.7 K/UL (ref 4.8–10.8)

## 2022-01-02 PROCEDURE — 99233 SBSQ HOSP IP/OBS HIGH 50: CPT | Performed by: PSYCHIATRY & NEUROLOGY

## 2022-01-02 PROCEDURE — 6370000000 HC RX 637 (ALT 250 FOR IP): Performed by: INTERNAL MEDICINE

## 2022-01-02 PROCEDURE — 85025 COMPLETE CBC W/AUTO DIFF WBC: CPT

## 2022-01-02 PROCEDURE — 6360000002 HC RX W HCPCS: Performed by: INTERNAL MEDICINE

## 2022-01-02 PROCEDURE — 80053 COMPREHEN METABOLIC PANEL: CPT

## 2022-01-02 PROCEDURE — 36415 COLL VENOUS BLD VENIPUNCTURE: CPT

## 2022-01-02 PROCEDURE — 1210000000 HC MED SURG R&B

## 2022-01-02 PROCEDURE — 6370000000 HC RX 637 (ALT 250 FOR IP): Performed by: STUDENT IN AN ORGANIZED HEALTH CARE EDUCATION/TRAINING PROGRAM

## 2022-01-02 RX ADMIN — CLOPIDOGREL BISULFATE 75 MG: 75 TABLET ORAL at 10:43

## 2022-01-02 RX ADMIN — METFORMIN HYDROCHLORIDE 750 MG: 500 TABLET ORAL at 20:32

## 2022-01-02 RX ADMIN — ATORVASTATIN CALCIUM 40 MG: 40 TABLET, FILM COATED ORAL at 20:32

## 2022-01-02 RX ADMIN — METFORMIN HYDROCHLORIDE 750 MG: 500 TABLET ORAL at 14:05

## 2022-01-02 RX ADMIN — LISINOPRIL 10 MG: 10 TABLET ORAL at 10:43

## 2022-01-02 RX ADMIN — GLIPIZIDE 10 MG: 10 TABLET ORAL at 08:03

## 2022-01-02 RX ADMIN — PANTOPRAZOLE SODIUM 40 MG: 40 TABLET, DELAYED RELEASE ORAL at 05:53

## 2022-01-02 RX ADMIN — GLIPIZIDE 10 MG: 10 TABLET ORAL at 17:00

## 2022-01-02 RX ADMIN — ASPIRIN 81 MG: 81 TABLET, COATED ORAL at 10:43

## 2022-01-02 RX ADMIN — ENOXAPARIN SODIUM 40 MG: 100 INJECTION SUBCUTANEOUS at 10:43

## 2022-01-02 ASSESSMENT — PAIN SCALES - GENERAL
PAINLEVEL_OUTOF10: 0
PAINLEVEL_OUTOF10: 0

## 2022-01-02 ASSESSMENT — ENCOUNTER SYMPTOMS
COLOR CHANGE: 0
VOMITING: 0
DIARRHEA: 0
NAUSEA: 0
PHOTOPHOBIA: 0
CHOKING: 0
TROUBLE SWALLOWING: 0
SHORTNESS OF BREATH: 0
COUGH: 0
BACK PAIN: 0

## 2022-01-02 NOTE — PROGRESS NOTES
Progress Note  Date:2022       Room:W283/W283-01  Patient Neena Eldridge     YOB: 1970     Age:51 y.o. Subjective    Subjective:  Symptoms:  No shortness of breath, malaise, cough, chest pain, weakness, headache, chest pressure, anorexia, diarrhea or anxiety. Diet:  No nausea or vomiting. Review of Systems   Respiratory: Negative for cough and shortness of breath. Cardiovascular: Negative for chest pain. Gastrointestinal: Negative for anorexia, diarrhea, nausea and vomiting. Neurological: Negative for weakness. Objective         Vitals Last 24 Hours:  TEMPERATURE:  Temp  Av.1 °F (36.7 °C)  Min: 97.7 °F (36.5 °C)  Max: 98.4 °F (36.9 °C)  RESPIRATIONS RANGE: Resp  Av  Min: 16  Max: 16  PULSE OXIMETRY RANGE: SpO2  Av %  Min: 97 %  Max: 99 %  PULSE RANGE: Pulse  Av.5  Min: 79  Max: 90  BLOOD PRESSURE RANGE: Systolic (80VYW), CGL:237 , Min:138 , LL   ; Diastolic (90YEU), ZL, Min:85, Max:96    I/O (24Hr): No intake or output data in the 24 hours ending 22 1224  Objective:  General Appearance:  Comfortable, well-appearing and in no acute distress. Vital signs: (most recent): Blood pressure 138/85, pulse 79, temperature 98.4 °F (36.9 °C), temperature source Oral, resp. rate 16, height 5' 9\" (1.753 m), weight 240 lb (108.9 kg), SpO2 99 %. HEENT: Normal HEENT exam.    Lungs:  Normal effort. Heart: Normal rate. Abdomen: Abdomen is soft. Bowel sounds are normal.     Pulses: Distal pulses are intact. Pupils:  Pupils are equal, round, and reactive to light. Skin:  Warm.       Labs/Imaging/Diagnostics    Labs:  CBC:  Recent Labs     21  0600 21  1842 22  0705   WBC 5.0 6.1 6.7   RBC 4.13* 4.36* 4.46*   HGB 12.3* 13.0* 13.3*   HCT 36.7* 38.5* 39.2*   MCV 88.9 88.2 87.9   RDW 12.5 12.3 12.5    198 193     CHEMISTRIES:  Recent Labs     21  1654 21  1654 21  0600 21  1841 01/02/22  0705      < > 139 138 140   K 4.6   < > 4.0 4.4 4.2      < > 104 102 103   CO2 28   < > 25 24 25   BUN 15   < > 17 13 18   CREATININE 0.62*   < > 0.52* 0.56* 0.55*   GLUCOSE 228*   < > 215* 211* 191*   MG 1.7  --   --   --   --     < > = values in this interval not displayed. PT/INR:  Recent Labs     12/30/21  1654   PROTIME 13.1   INR 1.0     APTT:  Recent Labs     12/30/21  1654   APTT 33.8     LIVER PROFILE:  Recent Labs     12/31/21  0600 12/31/21  1841 01/02/22  0705   AST 15 17 14   ALT 20 22 21   BILITOT <0.2 0.3 0.4   ALKPHOS 44 47 44       Imaging Last 24 Hours:  XR CHEST (2 VW)    Result Date: 12/31/2021  EXAMINATION: XR CHEST (2 VW) CLINICAL HISTORY: DYSARTHRIA COMPARISONS: None available. FINDINGS: Osseous structures are intact. Cardiopericardial silhouette is normal. Pulmonary vasculature is normal. Lungs are clear. NO ACUTE CARDIOPULMONARY DISEASE. CT HEAD WO CONTRAST    Result Date: 12/30/2021  EXAMINATION: CT of the brain without contrast HISTORY: Stroke. Dizziness. Slurred speech. COMPARISON: None available TECHNIQUE: Multiple contiguous axial images were obtained of the brain from the skull base through the vertex. Multiplanar reformats were obtained. FINDINGS: Prominence of the sulci and ventricles compatible with mild generalized parenchymal volume loss. Areas of bilateral supratentorial white matter hypoattenuation are nonspecific but most likely related to chronic small vessel ischemic changes in a patient of this age. Gray-white matter differentiation is preserved. No acute hemorrhage or abnormal extra-axial fluid collection. No mass effect or midline shift. Mild paranasal sinus mucosal thickening. The mastoid air cells are clear. Calvarium is intact. Two right frontal scalp lesions measure approximately 10 mm and 15 mm respectively. The posterior left scalp lesion measures approximately 5 mm and a left frontal scalp lesion measures approximately 5 mm.  Clinical correlation is recommended. No acute intracranial process. All CT scans at this facility use dose modulation, iterative reconstruction, and/or weight based dosing when appropriate to reduce radiation dose to as low as reasonably achievable. MRI BRAIN WO CONTRAST    Result Date: 12/31/2021  EXAMINATION:  MRI BRAIN WO CONTRAST HISTORY:  New onset slurred speech TECHNIQUE:  MRI brain routine protocol without contrast. COMPARISON:  CT brain 12/30/2021 RESULT: Acute Change:  Multiple foci of restricted diffusion along the left centrum semiovale, corona radiata and frontal operculum compatible with acute/subacute left MCA distribution infarcts. Hemorrhage:  No evidence of prior parenchymal hemorrhage on the susceptibility weighted sequences. Mass Lesion/ Mass Effect:  No evidence of an intracranial mass or extra-axial fluid collection. No significant mass effect. Chronic Change:  Increased T2/FLAIR signal within the area of acute infarct in the left frontal lobe. Parenchyma:  No significant parenchymal volume loss for age. Ventricles:  Normal caliber and morphology. Skull Base:  Hypothalamic and pituitary region are grossly normal. Craniocervical junction is normal. No significant marrow replacement process. Vasculature:  Major intracranial arteries and dural venous sinuses demonstrate typical flow voids, suggesting patency by spin echo criteria. Other:  Bilateral maxillary sinus mucus retention cysts. Mild paranasal sinus mucosal thickening otherwise. Mastoid air cells are clear. The orbits are unremarkable. Multiple cutaneous soft tissue lesions predominantly along the right face. Multiple foci of left frontal lobe restricted diffusion compatible with acute/subacute Left MCA distribution infarct. No hemorrhage.  COMMUNICATION:  Communicated with: Abdullahi Quinn , ER nurse on 12/31/2021 at 12:31 PM 12:31 PM.       US CAROTID ARTERY BILATERAL    Result Date: 12/31/2021  EXAMINATION: US CAROTID ARTERY BILATERAL HISTORY:   TIA vs CVA . Slurred speech, right-sided weakness COMPARISON:None TECHNIQUE: Carotid duplex sonograms which include gray scale and color flow evaluation are complimented with spectral waveform analysis. Please refer to chart below for specific carotid velocity measurements. The degree of stenosis recorded on this exam uses the same method of stratification used in the NASCET trials. This complies with ACR practice guidelines and the Society of radiology in ultrasound consensus statement and provides adequate information for clinical decision making. Society of Radiologists in Ultrasound (SRU) consensus statement was used to estimate internal carotid artery stenosis. RESULT: There is moderate to severe plaque in the left internal carotid artery, predominantly involving the proximal segment. There is mild plaque in right internal carotid artery. There is turbulent flow at the level of the proximal left internal carotid artery indicating significant stenosis, with high resistant waveforms seen in the mid to distal left internal carotid artery. There is antegrade flow identified in both vertebral arteries. ARTERIAL BLOOD FLOW VELOCITY RIGHT PEAK SYSTOLIC VELOCITIES (PSV)                                               Prox CCA    65 cm/s             Mid CCA     79 cm/s              Dist CCA    56 cm/s           Prox ICA    46 cm/s               Mid ICA     106 cm/s            Dist ICA    81 cm/s             Prox ECA    75 cm/s             Prox VERT   76 cm/s              ICA/CCA     1.3                        LEFT PEAK SYSTOLIC VELOCITIES (PSV)  Prox CCA    101 cm/s Mid CCA     70 cm/s Dist CCA    54 cm/s Prox ICA    31 cm/s Mid ICA     46 cm/s Dist ICA    48 cm/s Prox ECA    131 cm/s Prox VERT   44 cm/s ICA/CCA     0.7     HIGH-GRADE STENOSIS OF THE PROXIMAL LEFT INTERNAL CAROTID ARTERY.   <50  % STENOSIS IN THE RIGHT ICA ANTEGRADE FLOW IN THE BILATERAL VERTEBRAL ARTERIES     Assessment//Plan Hospital Problems           Last Modified POA    TIA (transient ischemic attack) 12/30/2021 Yes    Acute CVA (cerebrovascular accident) ([facility] [address] 12/31/2021 Yes    Cerebrovascular accident (CVA) due to stenosis of left carotid artery ([facility] [address] 1/2/2022 Yes    Stenosis of left carotid artery 1/2/2022 Yes    Aphasia 1/2/2022 Yes    Neurofibroma 1/2/2022 Yes      Carotid vascular dz    Assessment & Plan    12/31: Follow-up MRI, follow-up official report of CT angio head and neck. Patient has carotid vascular disease. Hemoglobin A1c is 9 counseling was given, made patient inpatient as per physician advisor recommendation. FU neuro recommendation  1/1: Patient was seen and evaluated. Vascular surgery is consulted for carodi vascular dz with CVA, FU cathleen, no overnight events, happy with the care. 1/2: CATHLEEN on Monday. Resume Metformin. No overnight events denies any needs, continue with current management. Anticipate discharge tomorrow.   Electronically signed by Helene Barragan MD on 12/31/21 at 1:05 PM EST

## 2022-01-02 NOTE — CONSULTS
[address]                      [address], [address] [facility]                                  CONSULTATION    PATIENT NAME: Emanuel Jansen            :        1970  MED REC NO:   [id number]                            ROOM:       B391  ACCOUNT NO:   [de-identified]                           ADMIT DATE: 2021  PROVIDER:     Silvia Zuluaga MD    CONSULT DATE:  2022    VASCULAR SURGERY CONSULTATION    HISTORY OF PRESENT ILLNESS:  This is a 59-year-old gentleman in room  283, admitted to our hospital on 2021 through the emergency room. He presented with dysarthria and upper and lower extremity right-sided  weakness. According to the history documented, the symptoms began the  day before the ER with some intermittent dysarthria about an hour to an  hour and a half. Before entry to the emergency room, he was in his  bathtub and noted that his right side became pronouncedly weak, and he  was having difficulty with his words. He was taken to the emergency  room and had been seen by the ER staff and was consulted by Dr. Sabra Nath  who has asked for me to see the patient. He is a diabetic who had  approximately 10 or 11 days before this event presented to his FMD's  office and was seen by the nurse practitioner for his diabetes. PAST MEDICAL HISTORY:  According to the history, he has no past issue  with cerebrovascular disease or any events. He does have diabetes and  hypertension, hypercholesterolemia, but is a nonsmoker and he has  neurofibromatosis. PAST SURGICAL HISTORY:  The patient's past surgical history is negative. SOCIAL HISTORY:  He is single, but lives with his [facility]. No  children are listed on file. He has been a never smoker. ALLERGIES:  He is allergic to PENICILLIN. OBJECTIVE EXAMINATION:  GENERAL:  He is awake, alert, and cooperative. VITAL SIGNS:  He is 5 feet 9 inches tall, 240 pounds, BMI is 35.44. Afebrile, respirations unlabored, pulse is 80, blood pressure is 120/50. EXTREMITIES:  He is right-hand dominant. He can oppose his thumb to his  fingers and he can move his right upper extremity in a circular motion. He also can flex his right knee and move in an extension and maneuver  his right leg. PSYCHIATRIC:  His mentation is quite appropriate. DIAGNOSTIC DATA:  The surveys that have been performed so far include  carotid ultrasonography with an interpretation of right ICA stenosis of  left less than 50%, but a high-grade stenosis of the proximal left ICA  and antegrade blood flow seen in the vertebral arteries. He also had in  addition to the ultrasound, a CT angiogram, and there it showed  noncalcified atherosclerotic plaque, which could not be interpreted any  further. IMPRESSION:  Dr. Pete [facility] concern is whether or not this patient has  definable disease, which potentially can be approached by surgery, i.e.,  endarterectomy, and if so, we need a definitive study such as a DSA to  interpret these issues, and I am seeing the patient accordingly. He  does have a palpable right femoral pulse. His GFR and creatinine are  normal.  He is on dual antiplatelet medication, and we will perform a  right femoral approach AF carotid DSA on Tuesday, 01/04/2022. I also  note in the record that he is to have a transesophageal echocardiogram,  having been seen yesterday by Dr. Zachary Zaragoza of Cardiology. I have discussed  this with the patient who understands and agrees. I have given him my  information card. I have instructed him that his Tasha Hicks, can  call me at any time to review what I have already stated with him. I thank, Dr. Craig Phillip, for this opportunity to render an opinion regarding  the patient. Total time spent in rendering this consultation has been 50 minutes.         Lupillo Jett MD    D: 01/02/2022 12:01:06       T: 01/02/2022 12:04:47     [email or web address]  Job#: [id number]     Doc#: [id number]    CC:

## 2022-01-02 NOTE — PROGRESS NOTES
Subjective:      Patient seen and examined for neurology follow up for acute/subacute left multifocal frontal lobe CVA in the distribution of the left CVA. No deficits on exam.     Patient is alert and oriented x 3, cooperative, and in no acute distress. Dicussed MRI results in detail and made patient aware of plan. All questions answered. Patient does not have any deficits on exam and denies headache, visual field deficit, weakness, paresthesias, or trouble swallowing. Patient reports no symptoms and has been ambulating in the room he denies any dizziness    Review of Systems   Constitutional: Negative for fever. HENT: Negative for ear pain, tinnitus and trouble swallowing. Eyes: Negative for photophobia and visual disturbance. Respiratory: Negative for choking and shortness of breath. Cardiovascular: Negative for chest pain and palpitations. Gastrointestinal: Negative for nausea and vomiting. Musculoskeletal: Negative for back pain, gait problem, joint swelling, myalgias, neck pain and neck stiffness. Skin: Negative for color change. Allergic/Immunologic: Negative for food allergies. Neurological: Negative for dizziness, tremors, seizures, syncope, facial asymmetry, speech difficulty, weakness, light-headedness, numbness and headaches. Psychiatric/Behavioral: Negative for behavioral problems, confusion, hallucinations and sleep disturbance. Patient remains mostly asymptomatic  Past Medical History:   Diagnosis Date    Hypertension     Type 2 diabetes mellitus ([facility] [address]      History reviewed. No pertinent surgical history.   Social History     Socioeconomic History    Marital status: Single     Spouse name: Not on file    Number of children: Not on file    Years of education: Not on file    Highest education level: Not on file   Occupational History    Not on file   Tobacco Use    Smoking status: Never Smoker    Smokeless tobacco: Never Used   Substance and Sexual Activity    Alcohol use: No     Alcohol/week: 0.0 standard drinks    Drug use: No    Sexual activity: Not on file   Other Topics Concern    Not on file   Social History Narrative    Not on file     Social Determinants of Health     Financial Resource Strain: Low Risk     Difficulty of Paying Living Expenses: Not hard at all   Food Insecurity: No Food Insecurity    Worried About Running Out of Food in the Last Year: Never true    Tai of Food in the Last Year: Never true   Transportation Needs: No Transportation Needs    Lack of Transportation (Medical): No    Lack of Transportation (Non-Medical):  No   Physical Activity:     Days of Exercise per Week: Not on file    Minutes of Exercise per Session: Not on file   Stress:     Feeling of Stress : Not on file   Social Connections:     Frequency of Communication with Friends and Family: Not on file    Frequency of Social Gatherings with Friends and Family: Not on file    Attends Advent Services: Not on file    Active Member of Clubs or Organizations: Not on file    Attends Club or Organization Meetings: Not on file    Marital Status: Not on file   Intimate Partner Violence:     Fear of Current or Ex-Partner: Not on file    Emotionally Abused: Not on file    Physically Abused: Not on file    Sexually Abused: Not on file   Housing Stability:     Unable to Pay for Housing in the Last Year: Not on file    Number of [place] in the Last Year: Not on file    Unstable Housing in the Last Year: Not on file     Family History   Problem Relation Age of Onset    Rheum Arthritis Mother     Osteoarthritis Mother     Hypertension Mother     Cancer Mother         uterine cancer    Other Father         myocardial infarction    Diabetes Father      Allergies   Allergen Reactions    Penicillins Hives and Swelling     Current Facility-Administered Medications   Medication Dose Route Frequency Provider Last Rate Last Admin    clopidogrel (PLAVIX) tablet 75 mg 75 mg Oral Daily Blade Webber PA-C   75 mg at 01/02/22 1043    metFORMIN (GLUCOPHAGE) tablet 750 mg  750 mg Oral TID Svetlana Garrett MD        glipiZIDE (GLUCOTROL) tablet 10 mg  10 mg Oral BID AC Svetlana Garrett MD   10 mg at 01/02/22 0803    lisinopril (PRINIVIL;ZESTRIL) tablet 10 mg  10 mg Oral Daily Svetlana Garrett MD   10 mg at 01/02/22 1043    insulin lispro (HUMALOG) injection vial 0-6 Units  0-6 Units SubCUTAneous TID WC Svetlana Garrett MD        insulin lispro (HUMALOG) injection vial 0-3 Units  0-3 Units SubCUTAneous Nightly Svetlana Garrett MD        hydrALAZINE (APRESOLINE) injection 10 mg  10 mg IntraVENous Q6H PRN Svetlana Garrett MD        aspirin EC tablet 81 mg  81 mg Oral Daily Svetlana Garrett MD   81 mg at 01/02/22 1043    atorvastatin (LIPITOR) tablet 40 mg  40 mg Oral Nightly Svetlana Garrett MD   40 mg at 01/01/22 2001    pantoprazole (PROTONIX) tablet 40 mg  40 mg Oral QAM AC Svetlana Garrett MD   40 mg at 01/02/22 0553    enoxaparin (LOVENOX) injection 40 mg  40 mg SubCUTAneous Daily Svetlana Garrett MD   40 mg at 01/02/22 1043        Objective:   /85   Pulse 79   Temp 98.4 °F (36.9 °C) (Oral)   Resp 16   Ht 5' 9\" (1.753 m)   Wt 240 lb (108.9 kg)   SpO2 99%   BMI 35.44 kg/m²     Physical Exam  Vitals reviewed. Eyes:      Pupils: Pupils are equal, round, and reactive to light. Cardiovascular:      Rate and Rhythm: Normal rate and regular rhythm. Heart sounds: No murmur heard. Pulmonary:      Effort: Pulmonary effort is normal.      Breath sounds: Normal breath sounds. Abdominal:      General: Bowel sounds are normal.   Musculoskeletal:         General: Normal range of motion. Cervical back: Normal range of motion. Skin:     General: Skin is warm. Neurological:      Mental Status: He is alert and oriented to person, place, and time. Cranial Nerves: No cranial nerve deficit. Sensory: No sensory deficit. Motor: No abnormal muscle tone.       Coordination: ARTERY BILATERAL    Result Date: 12/31/2021  EXAMINATION: US CAROTID ARTERY BILATERAL HISTORY:   TIA vs CVA . Slurred speech, right-sided weakness COMPARISON:None TECHNIQUE: Carotid duplex sonograms which include gray scale and color flow evaluation are complimented with spectral waveform analysis. Please refer to chart below for specific carotid velocity measurements. The degree of stenosis recorded on this exam uses the same method of stratification used in the NASCET trials. This complies with ACR practice guidelines and the Society of radiology in ultrasound consensus statement and provides adequate information for clinical decision making. Society of Radiologists in Ultrasound (SRU) consensus statement was used to estimate internal carotid artery stenosis. RESULT: There is moderate to severe plaque in the left internal carotid artery, predominantly involving the proximal segment. There is mild plaque in right internal carotid artery. There is turbulent flow at the level of the proximal left internal carotid artery indicating significant stenosis, with high resistant waveforms seen in the mid to distal left internal carotid artery. There is antegrade flow identified in both vertebral arteries. ARTERIAL BLOOD FLOW VELOCITY RIGHT PEAK SYSTOLIC VELOCITIES (PSV)                                               Prox CCA    65 cm/s             Mid CCA     79 cm/s              Dist CCA    56 cm/s           Prox ICA    46 cm/s               Mid ICA     106 cm/s            Dist ICA    81 cm/s             Prox ECA    75 cm/s             Prox VERT   76 cm/s              ICA/CCA     1.3                        LEFT PEAK SYSTOLIC VELOCITIES (PSV)  Prox CCA    101 cm/s Mid CCA     70 cm/s Dist CCA    54 cm/s Prox ICA    31 cm/s Mid ICA     46 cm/s Dist ICA    48 cm/s Prox ECA    131 cm/s Prox VERT   44 cm/s ICA/CCA     0.7     HIGH-GRADE STENOSIS OF THE PROXIMAL LEFT INTERNAL CAROTID ARTERY.   <50  % STENOSIS IN THE RIGHT ICA brain showed multifocal acute/subacute CVA in left MCA distribution. CTA of the neck showed diffuse stenosis of the left internal carotid artery. Approximately 60% stenosis of the proximal internal carotid artery. U/s of the carotid artery also showed high grade stenosis in the proximal left internal carotid artery. CTA of the head showed mild diffuse narrowing of the distal left internal carotid artery. ECHO with bubble study has not been completed. Will obtain hypercoagulable work up given patient's age. Will obtain work up for vasculitis given concern. A1c 9  LDL 53  Will add Plavix for DAPT given the severity of stenosis. Will order MEERA with bubble study and cardiology consult for PFO given age. Will consult Dr. Jelly Isaac regarding left internal carotid stenosis. Likely not a candidate for endarterectomy given location in internal carotid artery, but Dr. Ariana Randhawa would like to discuss with him. Remains nonfocal his examination and ambulatory. Patient has left carotid artery stenosis intra and extracranial though this cannot be certain without a cerebral angiogram.  We have discussed this findings with Dr. Jelly Isaac and this will be performed on Tuesday in the interim we will arrange for a MEERA  Jose C Randhawa MD, Antonina Carlson, American Board of Psychiatry & Neurology  Board Certified in Vascular Neurology  Board Certified in Neuromuscular Medicine  Certified in [facility]:

## 2022-01-03 ENCOUNTER — APPOINTMENT (OUTPATIENT)
Dept: CARDIAC CATH/INVASIVE PROCEDURES | Age: 52
DRG: 065 | End: 2022-01-03

## 2022-01-03 ENCOUNTER — ANESTHESIA EVENT (OUTPATIENT)
Dept: OPERATING ROOM | Age: 52
DRG: 065 | End: 2022-01-03

## 2022-01-03 LAB
ALBUMIN SERPL-MCNC: 4.2 G/DL (ref 3.5–4.6)
ALP BLD-CCNC: 50 U/L (ref 35–104)
ALT SERPL-CCNC: 20 U/L (ref 0–41)
ANION GAP SERPL CALCULATED.3IONS-SCNC: 12 MEQ/L (ref 9–15)
AST SERPL-CCNC: 15 U/L (ref 0–40)
BASOPHILS ABSOLUTE: 0 K/UL (ref 0–0.2)
BASOPHILS RELATIVE PERCENT: 0.3 %
BILIRUB SERPL-MCNC: 0.4 MG/DL (ref 0.2–0.7)
BUN BLDV-MCNC: 21 MG/DL (ref 6–20)
CALCIUM SERPL-MCNC: 9.7 MG/DL (ref 8.5–9.9)
CHLORIDE BLD-SCNC: 104 MEQ/L (ref 95–107)
CO2: 25 MEQ/L (ref 20–31)
CREAT SERPL-MCNC: 0.55 MG/DL (ref 0.7–1.2)
EKG ATRIAL RATE: 89 BPM
EKG P AXIS: 43 DEGREES
EKG P-R INTERVAL: 192 MS
EKG Q-T INTERVAL: 356 MS
EKG QRS DURATION: 72 MS
EKG QTC CALCULATION (BAZETT): 433 MS
EKG R AXIS: 26 DEGREES
EKG T AXIS: 27 DEGREES
EKG VENTRICULAR RATE: 89 BPM
EOSINOPHILS ABSOLUTE: 0.2 K/UL (ref 0–0.7)
EOSINOPHILS RELATIVE PERCENT: 2.4 %
GFR AFRICAN AMERICAN: >60
GFR NON-AFRICAN AMERICAN: >60
GLOBULIN: 3.4 G/DL (ref 2.3–3.5)
GLUCOSE BLD-MCNC: 155 MG/DL (ref 60–115)
GLUCOSE BLD-MCNC: 170 MG/DL (ref 60–115)
GLUCOSE BLD-MCNC: 190 MG/DL (ref 70–99)
GLUCOSE BLD-MCNC: 194 MG/DL (ref 60–115)
HCT VFR BLD CALC: 41.6 % (ref 42–52)
HEMOGLOBIN: 14.1 G/DL (ref 14–18)
HOMOCYSTEINE: 6.6 UMOL/L
LV EF: 60 %
LVEF MODALITY: NORMAL
LYMPHOCYTES ABSOLUTE: 1.9 K/UL (ref 1–4.8)
LYMPHOCYTES RELATIVE PERCENT: 25.6 %
MCH RBC QN AUTO: 30 PG (ref 27–31.3)
MCHC RBC AUTO-ENTMCNC: 33.9 % (ref 33–37)
MCV RBC AUTO: 88.4 FL (ref 80–100)
MONOCYTES ABSOLUTE: 0.6 K/UL (ref 0.2–0.8)
MONOCYTES RELATIVE PERCENT: 8.6 %
NEUTROPHILS ABSOLUTE: 4.6 K/UL (ref 1.4–6.5)
NEUTROPHILS RELATIVE PERCENT: 63.1 %
PDW BLD-RTO: 12.6 % (ref 11.5–14.5)
PERFORMED ON: ABNORMAL
PLATELET # BLD: 208 K/UL (ref 130–400)
POTASSIUM SERPL-SCNC: 4.6 MEQ/L (ref 3.4–4.9)
RBC # BLD: 4.71 M/UL (ref 4.7–6.1)
SODIUM BLD-SCNC: 141 MEQ/L (ref 135–144)
TOTAL PROTEIN: 7.6 G/DL (ref 6.3–8)
WBC # BLD: 7.3 K/UL (ref 4.8–10.8)

## 2022-01-03 PROCEDURE — 85025 COMPLETE CBC W/AUTO DIFF WBC: CPT

## 2022-01-03 PROCEDURE — 6360000002 HC RX W HCPCS

## 2022-01-03 PROCEDURE — 1210000000 HC MED SURG R&B

## 2022-01-03 PROCEDURE — 2580000003 HC RX 258

## 2022-01-03 PROCEDURE — 80053 COMPREHEN METABOLIC PANEL: CPT

## 2022-01-03 PROCEDURE — APPSS30 APP SPLIT SHARED TIME 16-30 MINUTES: Performed by: NURSE PRACTITIONER

## 2022-01-03 PROCEDURE — 6370000000 HC RX 637 (ALT 250 FOR IP): Performed by: INTERNAL MEDICINE

## 2022-01-03 PROCEDURE — 93325 DOPPLER ECHO COLOR FLOW MAPG: CPT

## 2022-01-03 PROCEDURE — 6360000002 HC RX W HCPCS: Performed by: INTERNAL MEDICINE

## 2022-01-03 PROCEDURE — 93325 DOPPLER ECHO COLOR FLOW MAPG: CPT | Performed by: INTERNAL MEDICINE

## 2022-01-03 PROCEDURE — 93321 DOPPLER ECHO F-UP/LMTD STD: CPT

## 2022-01-03 PROCEDURE — 2580000003 HC RX 258: Performed by: INTERNAL MEDICINE

## 2022-01-03 PROCEDURE — 99231 SBSQ HOSP IP/OBS SF/LOW 25: CPT | Performed by: INTERNAL MEDICINE

## 2022-01-03 PROCEDURE — 36415 COLL VENOUS BLD VENIPUNCTURE: CPT

## 2022-01-03 PROCEDURE — 6370000000 HC RX 637 (ALT 250 FOR IP): Performed by: STUDENT IN AN ORGANIZED HEALTH CARE EDUCATION/TRAINING PROGRAM

## 2022-01-03 PROCEDURE — 93312 ECHO TRANSESOPHAGEAL: CPT

## 2022-01-03 PROCEDURE — 93312 ECHO TRANSESOPHAGEAL: CPT | Performed by: INTERNAL MEDICINE

## 2022-01-03 PROCEDURE — 99233 SBSQ HOSP IP/OBS HIGH 50: CPT | Performed by: PSYCHIATRY & NEUROLOGY

## 2022-01-03 PROCEDURE — B24BZZ4 ULTRASONOGRAPHY OF HEART WITH AORTA, TRANSESOPHAGEAL: ICD-10-PCS | Performed by: INTERNAL MEDICINE

## 2022-01-03 RX ORDER — SODIUM CHLORIDE 0.9 % (FLUSH) 0.9 %
5-40 SYRINGE (ML) INJECTION PRN
Status: DISCONTINUED | OUTPATIENT
Start: 2022-01-03 | End: 2022-01-05 | Stop reason: HOSPADM

## 2022-01-03 RX ORDER — MIDAZOLAM HYDROCHLORIDE 1 MG/ML
INJECTION INTRAMUSCULAR; INTRAVENOUS
Status: COMPLETED | OUTPATIENT
Start: 2022-01-03 | End: 2022-01-03

## 2022-01-03 RX ORDER — SODIUM CHLORIDE 9 MG/ML
INJECTION, SOLUTION INTRAVENOUS CONTINUOUS
Status: DISCONTINUED | OUTPATIENT
Start: 2022-01-03 | End: 2022-01-05 | Stop reason: HOSPADM

## 2022-01-03 RX ORDER — SODIUM CHLORIDE 9 MG/ML
25 INJECTION, SOLUTION INTRAVENOUS PRN
Status: DISCONTINUED | OUTPATIENT
Start: 2022-01-03 | End: 2022-01-05 | Stop reason: HOSPADM

## 2022-01-03 RX ORDER — SODIUM CHLORIDE 0.9 % (FLUSH) 0.9 %
5-40 SYRINGE (ML) INJECTION EVERY 12 HOURS SCHEDULED
Status: DISCONTINUED | OUTPATIENT
Start: 2022-01-03 | End: 2022-01-05 | Stop reason: HOSPADM

## 2022-01-03 RX ADMIN — ASPIRIN 81 MG: 81 TABLET, COATED ORAL at 12:04

## 2022-01-03 RX ADMIN — ATORVASTATIN CALCIUM 40 MG: 40 TABLET, FILM COATED ORAL at 21:15

## 2022-01-03 RX ADMIN — MIDAZOLAM HYDROCHLORIDE 2 MG: 1 INJECTION, SOLUTION INTRAMUSCULAR; INTRAVENOUS at 13:46

## 2022-01-03 RX ADMIN — FENTANYL CITRATE 25 MCG: 50 INJECTION, SOLUTION INTRAMUSCULAR; INTRAVENOUS at 13:48

## 2022-01-03 RX ADMIN — ENOXAPARIN SODIUM 40 MG: 100 INJECTION SUBCUTANEOUS at 21:15

## 2022-01-03 RX ADMIN — BENZOCAINE 2 EACH: 200 SPRAY DENTAL; ORAL; PERIODONTAL at 13:43

## 2022-01-03 RX ADMIN — METFORMIN HYDROCHLORIDE 750 MG: 500 TABLET ORAL at 19:09

## 2022-01-03 RX ADMIN — PANTOPRAZOLE SODIUM 40 MG: 40 TABLET, DELAYED RELEASE ORAL at 06:49

## 2022-01-03 RX ADMIN — FENTANYL CITRATE 50 MCG: 50 INJECTION, SOLUTION INTRAMUSCULAR; INTRAVENOUS at 13:45

## 2022-01-03 RX ADMIN — MIDAZOLAM HYDROCHLORIDE 1 MG: 1 INJECTION, SOLUTION INTRAMUSCULAR; INTRAVENOUS at 13:48

## 2022-01-03 RX ADMIN — GLIPIZIDE 10 MG: 10 TABLET ORAL at 19:09

## 2022-01-03 RX ADMIN — SODIUM CHLORIDE, PRESERVATIVE FREE 10 ML: 5 INJECTION INTRAVENOUS at 21:17

## 2022-01-03 RX ADMIN — METFORMIN HYDROCHLORIDE 750 MG: 500 TABLET ORAL at 12:04

## 2022-01-03 RX ADMIN — CLOPIDOGREL BISULFATE 75 MG: 75 TABLET ORAL at 12:04

## 2022-01-03 RX ADMIN — LISINOPRIL 10 MG: 10 TABLET ORAL at 12:04

## 2022-01-03 ASSESSMENT — ENCOUNTER SYMPTOMS
CHOKING: 0
COLOR CHANGE: 0
NAUSEA: 0
BACK PAIN: 0
BLOOD IN STOOL: 0
COUGH: 0
SHORTNESS OF BREATH: 0
PHOTOPHOBIA: 0
TROUBLE SWALLOWING: 0
WHEEZING: 0
DIARRHEA: 0
VOMITING: 0

## 2022-01-03 NOTE — SEDATION DOCUMENTATION
1349: MEERA probe inserted, pt tolerating well, procedure in progress   1351: Bubble study performed   1356: MEERA probe removed, procedure is complete.

## 2022-01-03 NOTE — PROGRESS NOTES
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 Which was negative. Carotid ultrasound is positive. Evaluated by his vascular surgery    Review of Systems:   Review of Systems   Constitutional: Negative for diaphoresis. HENT: Negative for nosebleeds. Cardiovascular: Negative for chest pain, palpitations and leg swelling. Gastrointestinal: Negative for blood in stool, nausea and vomiting. Musculoskeletal: Negative for myalgias. Neurological: Negative for dizziness, seizures, weakness and headaches. Psychiatric/Behavioral: The patient is not nervous/anxious. Physical Examination:    /81   Pulse 81   Temp 97.4 °F (36.3 °C) (Oral)   Resp 11   Ht 5' 9\" (1.753 m)   Wt 240 lb (108.9 kg)   SpO2 95%   BMI 35.44 kg/m²    Physical Exam  Constitutional:       Appearance: He is well-developed. Cardiovascular:      Rate and Rhythm: Normal rate and regular rhythm. Heart sounds: Normal heart sounds. No murmur heard. No friction rub. No gallop. Pulmonary:      Effort: Pulmonary effort is normal. No respiratory distress. Breath sounds: Normal breath sounds. No wheezing or rales. Chest:      Chest wall: No tenderness. Musculoskeletal:         General: No tenderness or deformity. Normal range of motion. Skin:     General: Skin is warm and dry. Findings: No erythema or rash. Neurological:      Mental Status: He is alert and oriented to person, place, and time. Cranial Nerves: No cranial nerve deficit. Motor: No abnormal muscle tone.       Coordination: Coordination normal.      Deep Tendon Reflexes: Reflexes are normal and symmetric. Reflexes normal.   Psychiatric:         Behavior: Behavior normal.         Thought Content: Thought content normal.         Judgment: Judgment normal.         LABS:  CBC:   Lab Results   Component Value Date    WBC 7.3 01/03/2022    RBC 4.71 01/03/2022    HGB 14.1 01/03/2022    HCT 41.6 01/03/2022    MCV 88.4 01/03/2022    MCH 30.0 01/03/2022    MCHC 33.9 01/03/2022    RDW 12.6 01/03/2022     01/03/2022     CBC with Differential:   Lab Results   Component Value Date    WBC 7.3 01/03/2022    RBC 4.71 01/03/2022    HGB 14.1 01/03/2022    HCT 41.6 01/03/2022     01/03/2022    MCV 88.4 01/03/2022    MCH 30.0 01/03/2022    MCHC 33.9 01/03/2022    RDW 12.6 01/03/2022    LYMPHOPCT 25.6 01/03/2022    MONOPCT 8.6 01/03/2022    BASOPCT 0.3 01/03/2022    MONOSABS 0.6 01/03/2022    LYMPHSABS 1.9 01/03/2022    EOSABS 0.2 01/03/2022    BASOSABS 0.0 01/03/2022     CMP:    Lab Results   Component Value Date     01/03/2022    K 4.6 01/03/2022     01/03/2022    CO2 25 01/03/2022    BUN 21 01/03/2022    CREATININE 0.55 01/03/2022    GFRAA >60.0 01/03/2022    LABGLOM >60.0 01/03/2022    GLUCOSE 190 01/03/2022    PROT 7.6 01/03/2022    LABALBU 4.2 01/03/2022    CALCIUM 9.7 01/03/2022    BILITOT 0.4 01/03/2022    ALKPHOS 50 01/03/2022    AST 15 01/03/2022    ALT 20 01/03/2022     BMP:    Lab Results   Component Value Date     01/03/2022    K 4.6 01/03/2022     01/03/2022    CO2 25 01/03/2022    BUN 21 01/03/2022    LABALBU 4.2 01/03/2022    CREATININE 0.55 01/03/2022    CALCIUM 9.7 01/03/2022    GFRAA >60.0 01/03/2022    LABGLOM >60.0 01/03/2022    GLUCOSE 190 01/03/2022     Magnesium:    Lab Results   Component Value Date    MG 1.7 12/30/2021     Troponin:    Lab Results   Component Value Date    TROPONINI <0.010 12/30/2021       Radiology:  No results found.      EKG:   Assessment:    Active Hospital Problems    Diagnosis Date Noted    Cerebrovascular accident (CVA) due to stenosis of left carotid artery (HCC) [I63.232]     Stenosis of left carotid artery [I65.22]     Aphasia [R47.01]     Neurofibroma [D36.10]     Acute CVA (cerebrovascular accident) ([facility] [address] [I63.9] 12/31/2021    TIA (transient ischemic attack) [G45.9] 12/30/2021           Plan:  1. We will sign off  2. MEERA was negative  3.  Probably  he needs carotid arterectomy  Electronically signed by Delmy Izquierdo MD on 1/3/2022 at 3:31 PM

## 2022-01-03 NOTE — PROGRESS NOTES
Progress Note  Date:1/3/2022       VJID:[id number]  Patient Lyubov May     YOB: 1970     Age:51 y.o. Subjective    Subjective:  Symptoms:  No shortness of breath, malaise, cough, chest pain, weakness, headache, chest pressure, anorexia, diarrhea or anxiety. Diet:  No nausea or vomiting. Review of Systems   Respiratory: Negative for cough and shortness of breath. Cardiovascular: Negative for chest pain. Gastrointestinal: Negative for anorexia, diarrhea, nausea and vomiting. Neurological: Negative for weakness. Objective         Vitals Last 24 Hours:  TEMPERATURE:  Temp  Av.8 °F (36.6 °C)  Min: 97.4 °F (36.3 °C)  Max: 98.1 °F (36.7 °C)  RESPIRATIONS RANGE: Resp  Av  Min: 18  Max: 18  PULSE OXIMETRY RANGE: SpO2  Av %  Min: 98 %  Max: 100 %  PULSE RANGE: Pulse  Av.5  Min: 91  Max: 92  BLOOD PRESSURE RANGE: Systolic (46EKN), VZC:018 , Min:118 , SHANKAR:940   ; Diastolic (59KWE), QGL:07, Min:82, Max:93    I/O (24Hr): Intake/Output Summary (Last 24 hours) at 1/3/2022 1049  Last data filed at 1/3/2022 6784  Gross per 24 hour   Intake 270 ml   Output    Net 270 ml     Objective:  General Appearance:  Comfortable, well-appearing and in no acute distress. Vital signs: (most recent): Blood pressure 118/82, pulse 92, temperature 97.4 °F (36.3 °C), temperature source Oral, resp. rate 18, height 5' 9\" (1.753 m), weight 240 lb (108.9 kg), SpO2 98 %. HEENT: Normal HEENT exam.    Lungs:  Normal effort. Heart: Normal rate. Abdomen: Abdomen is soft. Bowel sounds are normal.     Pulses: Distal pulses are intact. Pupils:  Pupils are equal, round, and reactive to light. Skin:  Warm.       Labs/Imaging/Diagnostics    Labs:  CBC:  Recent Labs     21  1842 22  0705 22  0740   WBC 6.1 6.7 7.3   RBC 4.36* 4.46* 4.71   HGB 13.0* 13.3* 14.1   HCT 38.5* 39.2* 41.6*   MCV 88.2 87.9 88.4   RDW 12.3 12.5 12.6    193 208 CHEMISTRIES:  Recent Labs     12/31/21 1841 01/02/22  0705 01/03/22  0740    140 141   K 4.4 4.2 4.6    103 104   CO2 24 25 25   BUN 13 18 21*   CREATININE 0.56* 0.55* 0.55*   GLUCOSE 211* 191* 190*     PT/INR:  No results for input(s): PROTIME, INR in the last 72 hours. APTT:  No results for input(s): APTT in the last 72 hours. LIVER PROFILE:  Recent Labs     12/31/21 1841 01/02/22  0705 01/03/22  0740   AST 17 14 15   ALT 22 21 20   BILITOT 0.3 0.4 0.4   ALKPHOS 47 44 50       Imaging Last 24 Hours:  XR CHEST (2 VW)    Result Date: 12/31/2021  EXAMINATION: XR CHEST (2 VW) CLINICAL HISTORY: DYSARTHRIA COMPARISONS: None available. FINDINGS: Osseous structures are intact. Cardiopericardial silhouette is normal. Pulmonary vasculature is normal. Lungs are clear. NO ACUTE CARDIOPULMONARY DISEASE. CT HEAD WO CONTRAST    Result Date: 12/30/2021  EXAMINATION: CT of the brain without contrast HISTORY: Stroke. Dizziness. Slurred speech. COMPARISON: None available TECHNIQUE: Multiple contiguous axial images were obtained of the brain from the skull base through the vertex. Multiplanar reformats were obtained. FINDINGS: Prominence of the sulci and ventricles compatible with mild generalized parenchymal volume loss. Areas of bilateral supratentorial white matter hypoattenuation are nonspecific but most likely related to chronic small vessel ischemic changes in a patient of this age. Gray-white matter differentiation is preserved. No acute hemorrhage or abnormal extra-axial fluid collection. No mass effect or midline shift. Mild paranasal sinus mucosal thickening. The mastoid air cells are clear. Calvarium is intact. Two right frontal scalp lesions measure approximately 10 mm and 15 mm respectively. The posterior left scalp lesion measures approximately 5 mm and a left frontal scalp lesion measures approximately 5 mm. Clinical correlation is recommended. No acute intracranial process.  All CT scans at this facility use dose modulation, iterative reconstruction, and/or weight based dosing when appropriate to reduce radiation dose to as low as reasonably achievable. MRI BRAIN WO CONTRAST    Result Date: 12/31/2021  EXAMINATION:  MRI BRAIN WO CONTRAST HISTORY:  New onset slurred speech TECHNIQUE:  MRI brain routine protocol without contrast. COMPARISON:  CT brain 12/30/2021 RESULT: Acute Change:  Multiple foci of restricted diffusion along the left centrum semiovale, corona radiata and frontal operculum compatible with acute/subacute left MCA distribution infarcts. Hemorrhage:  No evidence of prior parenchymal hemorrhage on the susceptibility weighted sequences. Mass Lesion/ Mass Effect:  No evidence of an intracranial mass or extra-axial fluid collection. No significant mass effect. Chronic Change:  Increased T2/FLAIR signal within the area of acute infarct in the left frontal lobe. Parenchyma:  No significant parenchymal volume loss for age. Ventricles:  Normal caliber and morphology. Skull Base:  Hypothalamic and pituitary region are grossly normal. Craniocervical junction is normal. No significant marrow replacement process. Vasculature:  Major intracranial arteries and dural venous sinuses demonstrate typical flow voids, suggesting patency by spin echo criteria. Other:  Bilateral maxillary sinus mucus retention cysts. Mild paranasal sinus mucosal thickening otherwise. Mastoid air cells are clear. The orbits are unremarkable. Multiple cutaneous soft tissue lesions predominantly along the right face. Multiple foci of left frontal lobe restricted diffusion compatible with acute/subacute Left MCA distribution infarct. No hemorrhage. COMMUNICATION:  Communicated with: Abdullahi Quinn , ER nurse on 12/31/2021 at 12:31 PM 12:31 PM.       US CAROTID ARTERY BILATERAL    Result Date: 12/31/2021  EXAMINATION: US CAROTID ARTERY BILATERAL HISTORY:   TIA vs CVA .  Slurred speech, right-sided weakness COMPARISON:None TECHNIQUE: Carotid duplex sonograms which include gray scale and color flow evaluation are complimented with spectral waveform analysis. Please refer to chart below for specific carotid velocity measurements. The degree of stenosis recorded on this exam uses the same method of stratification used in the NASCET trials. This complies with ACR practice guidelines and the Society of radiology in ultrasound consensus statement and provides adequate information for clinical decision making. Society of Radiologists in Ultrasound (SRU) consensus statement was used to estimate internal carotid artery stenosis. RESULT: There is moderate to severe plaque in the left internal carotid artery, predominantly involving the proximal segment. There is mild plaque in right internal carotid artery. There is turbulent flow at the level of the proximal left internal carotid artery indicating significant stenosis, with high resistant waveforms seen in the mid to distal left internal carotid artery. There is antegrade flow identified in both vertebral arteries. ARTERIAL BLOOD FLOW VELOCITY RIGHT PEAK SYSTOLIC VELOCITIES (PSV)                                               Prox CCA    65 cm/s             Mid CCA     79 cm/s              Dist CCA    56 cm/s           Prox ICA    46 cm/s               Mid ICA     106 cm/s            Dist ICA    81 cm/s             Prox ECA    75 cm/s             Prox VERT   76 cm/s              ICA/CCA     1.3                        LEFT PEAK SYSTOLIC VELOCITIES (PSV)  Prox CCA    101 cm/s Mid CCA     70 cm/s Dist CCA    54 cm/s Prox ICA    31 cm/s Mid ICA     46 cm/s Dist ICA    48 cm/s Prox ECA    131 cm/s Prox VERT   44 cm/s ICA/CCA     0.7     HIGH-GRADE STENOSIS OF THE PROXIMAL LEFT INTERNAL CAROTID ARTERY.   <50  % STENOSIS IN THE RIGHT ICA ANTEGRADE FLOW IN THE BILATERAL VERTEBRAL ARTERIES     Assessment//Plan           Hospital Problems           Last Modified POA    TIA (transient ischemic attack) 12/30/2021 Yes    Acute CVA (cerebrovascular accident) ([facility] [address] 12/31/2021 Yes    Cerebrovascular accident (CVA) due to stenosis of left carotid artery ([address] 1/2/2022 Yes    Stenosis of left carotid artery 1/2/2022 Yes    Aphasia 1/2/2022 Yes    Neurofibroma 1/2/2022 Yes      Carotid vascular dz    Assessment & Plan    12/31: Follow-up MRI, follow-up official report of CT angio head and neck. Patient has carotid vascular disease. Hemoglobin A1c is 9 counseling was given, made patient inpatient as per physician advisor recommendation. FU neuro recommendation  1/1: Patient was seen and evaluated. Vascular surgery is consulted for carodi vascular dz with CVA, FU cathleen, no overnight events, happy with the care. 1/2: CATHLEEN on Monday. Resume Metformin. No overnight events denies any needs, continue with current management. Anticipate discharge tomorrow.   1/3: pt was seen and evaluated, going for vascular surgery tomorrow, and possible CATHLEEN today, deneis any needs  Electronically signed by Angel Walker MD on 12/31/21 at 1:05 PM EST

## 2022-01-03 NOTE — PROGRESS NOTES
Subjective:      Patient seen and examined for neurology follow up for acute/subacute left multifocal frontal lobe CVA in the distribution of the left MCA. Patient is alert and oriented x3, no acute stress, cooperative. Wife at bedside. Continues to have some expressive aphasia and mild right upper extremity incoordination. No other focal deficits appreciated. No headache, vision changes or dysphagia. Blood pressure stable today. Denies chest pain pressure palpitations. Review of Systems   Constitutional: Negative for appetite change, fatigue and fever. HENT: Negative for hearing loss and trouble swallowing. Eyes: Negative for photophobia and visual disturbance. Respiratory: Negative for choking, shortness of breath and wheezing. Cardiovascular: Negative for chest pain and palpitations. Gastrointestinal: Negative for nausea and vomiting. Musculoskeletal: Negative for back pain, gait problem, joint swelling, myalgias, neck pain and neck stiffness. Skin: Negative for color change. Neurological: Positive for speech difficulty. Negative for dizziness, tremors, seizures, syncope, facial asymmetry, weakness, light-headedness, numbness and headaches. Psychiatric/Behavioral: Negative for behavioral problems, confusion, hallucinations and sleep disturbance. As above   Patient remains mostly asymptomatic  Past Medical History:   Diagnosis Date    Hypertension     Type 2 diabetes mellitus ([facility] [address]      History reviewed. No pertinent surgical history.   Social History     Socioeconomic History    Marital status: Single     Spouse name: Not on file    Number of children: Not on file    Years of education: Not on file    Highest education level: Not on file   Occupational History    Not on file   Tobacco Use    Smoking status: Never Smoker    Smokeless tobacco: Never Used   Substance and Sexual Activity    Alcohol use: No     Alcohol/week: 0.0 standard drinks    Drug use: No    Sexual activity: Not on file   Other Topics Concern    Not on file   Social History Narrative    Not on file     Social Determinants of Health     Financial Resource Strain: Low Risk     Difficulty of Paying Living Expenses: Not hard at all   Food Insecurity: No Food Insecurity    Worried About [address] in the Last Year: Never true    [address] [facility] [address] in the Last Year: Never true   Transportation Needs: No Transportation Needs    Lack of Transportation (Medical): No    Lack of Transportation (Non-Medical):  No   Physical Activity:     Days of Exercise per Week: Not on file    Minutes of Exercise per Session: Not on file   Stress:     Feeling of Stress : Not on file   Social Connections:     Frequency of Communication with Friends and Family: Not on file    Frequency of Social Gatherings with Friends and Family: Not on file    Attends Orthodox Services: Not on file    Active Member of Clubs or Organizations: Not on file    Attends Club or Organization Meetings: Not on file    Marital Status: Not on file   Intimate Partner Violence:     Fear of Current or Ex-Partner: Not on file    Emotionally Abused: Not on file    Physically Abused: Not on file    Sexually Abused: Not on file   Housing Stability:     Unable to Pay for Housing in the Last Year: Not on file    Number of [place] in the Last Year: Not on file    Unstable Housing in the Last Year: Not on file     Family History   Problem Relation Age of Onset    Rheum Arthritis Mother     Osteoarthritis Mother     Hypertension Mother     Cancer Mother         uterine cancer    Other Father         myocardial infarction    Diabetes Father      Allergies   Allergen Reactions    Penicillins Hives and Swelling     Current Facility-Administered Medications   Medication Dose Route Frequency Provider Last Rate Last Admin    clopidogrel (PLAVIX) tablet 75 mg  75 mg Oral Daily Bertha Navarro PA-C   75 mg at 01/02/22 1043    metFORMIN (GLUCOPHAGE) tablet 750 mg  750 mg Oral TID Alberto Lopez Urmila Ellsworth MD   750 mg at 01/02/22 2032    glipiZIDE (GLUCOTROL) tablet 10 mg  10 mg Oral BID AC Angel Walker MD   10 mg at 01/02/22 1700    lisinopril (PRINIVIL;ZESTRIL) tablet 10 mg  10 mg Oral Daily Angel Walker MD   10 mg at 01/02/22 1043    insulin lispro (HUMALOG) injection vial 0-6 Units  0-6 Units SubCUTAneous TID WC Angel Walker MD        insulin lispro (HUMALOG) injection vial 0-3 Units  0-3 Units SubCUTAneous Nightly Angel Walker MD        hydrALAZINE (APRESOLINE) injection 10 mg  10 mg IntraVENous Q6H PRN Angel Walker MD        aspirin EC tablet 81 mg  81 mg Oral Daily Angel Walker MD   81 mg at 01/02/22 1043    atorvastatin (LIPITOR) tablet 40 mg  40 mg Oral Nightly Angel Walker MD   40 mg at 01/02/22 2032    pantoprazole (PROTONIX) tablet 40 mg  40 mg Oral QAM AC Angel Walker MD   40 mg at 01/03/22 0649    enoxaparin (LOVENOX) injection 40 mg  40 mg SubCUTAneous Daily Angel Walker MD   40 mg at 01/02/22 1043        Objective:   /82   Pulse 92   Temp 97.4 °F (36.3 °C) (Oral)   Resp 18   Ht 5' 9\" (1.753 m)   Wt 240 lb (108.9 kg)   SpO2 98%   BMI 35.44 kg/m²     Physical Exam  Vitals reviewed. Constitutional:       General: He is not in acute distress. Appearance: He is not ill-appearing or diaphoretic. HENT:      Head: Normocephalic and atraumatic. Eyes:      General: No visual field deficit. Extraocular Movements: Extraocular movements intact. Pupils: Pupils are equal, round, and reactive to light. Cardiovascular:      Rate and Rhythm: Normal rate and regular rhythm. Heart sounds: No murmur heard. Pulmonary:      Effort: Pulmonary effort is normal. No respiratory distress. Breath sounds: Normal breath sounds. Abdominal:      General: Bowel sounds are normal. There is no distension. Palpations: Abdomen is soft. Tenderness: There is no abdominal tenderness. Musculoskeletal:         General: Normal range of motion.       Cervical back: Normal range of motion. Skin:     General: Skin is warm and dry. Neurological:      Mental Status: He is alert and oriented to person, place, and time. Cranial Nerves: No cranial nerve deficit, dysarthria or facial asymmetry. Sensory: No sensory deficit. Motor: No weakness, tremor, atrophy, abnormal muscle tone, seizure activity or pronator drift. Coordination: Coordination abnormal. Finger-Nose-Finger Test abnormal.      Deep Tendon Reflexes: Reflexes are normal and symmetric. Babinski sign absent on the right side. Babinski sign absent on the left side. Comments: Patient with some mild right upper extremity incoordination And expressive aphasia   Psychiatric:         Mood and Affect: Mood normal.       XR CHEST (2 VW)    Result Date: 12/31/2021  EXAMINATION: XR CHEST (2 VW) CLINICAL HISTORY: DYSARTHRIA COMPARISONS: None available. FINDINGS: Osseous structures are intact. Cardiopericardial silhouette is normal. Pulmonary vasculature is normal. Lungs are clear. NO ACUTE CARDIOPULMONARY DISEASE. CT HEAD WO CONTRAST    Result Date: 12/30/2021  EXAMINATION: CT of the brain without contrast HISTORY: Stroke. Dizziness. Slurred speech. COMPARISON: None available TECHNIQUE: Multiple contiguous axial images were obtained of the brain from the skull base through the vertex. Multiplanar reformats were obtained. FINDINGS: Prominence of the sulci and ventricles compatible with mild generalized parenchymal volume loss. Areas of bilateral supratentorial white matter hypoattenuation are nonspecific but most likely related to chronic small vessel ischemic changes in a patient of this age. Gray-white matter differentiation is preserved. No acute hemorrhage or abnormal extra-axial fluid collection. No mass effect or midline shift. Mild paranasal sinus mucosal thickening. The mastoid air cells are clear. Calvarium is intact.  Two right frontal scalp lesions measure approximately 10 mm and 15 mm respectively. The posterior left scalp lesion measures approximately 5 mm and a left frontal scalp lesion measures approximately 5 mm. Clinical correlation is recommended. No acute intracranial process. All CT scans at this facility use dose modulation, iterative reconstruction, and/or weight based dosing when appropriate to reduce radiation dose to as low as reasonably achievable. US CAROTID ARTERY BILATERAL    Result Date: 12/31/2021  EXAMINATION: US CAROTID ARTERY BILATERAL HISTORY:   TIA vs CVA . Slurred speech, right-sided weakness COMPARISON:None TECHNIQUE: Carotid duplex sonograms which include gray scale and color flow evaluation are complimented with spectral waveform analysis. Please refer to chart below for specific carotid velocity measurements. The degree of stenosis recorded on this exam uses the same method of stratification used in the NASCET trials. This complies with ACR practice guidelines and the Society of radiology in ultrasound consensus statement and provides adequate information for clinical decision making. Society of Radiologists in Ultrasound (SRU) consensus statement was used to estimate internal carotid artery stenosis. RESULT: There is moderate to severe plaque in the left internal carotid artery, predominantly involving the proximal segment. There is mild plaque in right internal carotid artery. There is turbulent flow at the level of the proximal left internal carotid artery indicating significant stenosis, with high resistant waveforms seen in the mid to distal left internal carotid artery. There is antegrade flow identified in both vertebral arteries.  ARTERIAL BLOOD FLOW VELOCITY RIGHT PEAK SYSTOLIC VELOCITIES (PSV)                                               Prox CCA    65 cm/s             Mid CCA     79 cm/s              Dist CCA    56 cm/s           Prox ICA    46 cm/s               Mid ICA     106 cm/s            Dist ICA    81 cm/s             Prox ECA    75 cm/s Prox VERT   76 cm/s              ICA/CCA     1.3                        LEFT PEAK SYSTOLIC VELOCITIES (PSV)  Prox CCA    101 cm/s Mid CCA     70 cm/s Dist CCA    54 cm/s Prox ICA    31 cm/s Mid ICA     46 cm/s Dist ICA    48 cm/s Prox ECA    131 cm/s Prox VERT   44 cm/s ICA/CCA     0.7     HIGH-GRADE STENOSIS OF THE PROXIMAL LEFT INTERNAL CAROTID ARTERY. <50  % STENOSIS IN THE RIGHT ICA ANTEGRADE FLOW IN THE BILATERAL VERTEBRAL ARTERIES       Lab Results   Component Value Date    WBC 7.3 01/03/2022    RBC 4.71 01/03/2022    HGB 14.1 01/03/2022    HCT 41.6 01/03/2022    MCV 88.4 01/03/2022    MCH 30.0 01/03/2022    MCHC 33.9 01/03/2022    RDW 12.6 01/03/2022     01/03/2022     Lab Results   Component Value Date     01/03/2022    K 4.6 01/03/2022     01/03/2022    CO2 25 01/03/2022    BUN 21 01/03/2022    CREATININE 0.55 01/03/2022    GFRAA >60.0 01/03/2022    LABGLOM >60.0 01/03/2022    GLUCOSE 190 01/03/2022    PROT 7.6 01/03/2022    LABALBU 4.2 01/03/2022    CALCIUM 9.7 01/03/2022    BILITOT 0.4 01/03/2022    ALKPHOS 50 01/03/2022    AST 15 01/03/2022    ALT 20 01/03/2022     Lab Results   Component Value Date    PROTIME 13.1 12/30/2021    INR 1.0 12/30/2021     Lab Results   Component Value Date    TSH 1.340 05/11/2021     Lab Results   Component Value Date    TRIG 170 12/30/2021    HDL 40 12/30/2021    LDLCALC 53 12/30/2021     No results found for: LABAMPH, BARBSCNU, LABBENZ, CANNAB, COCAINESCRN, LABMETH, OPIATESCREENURINE, PHENCYCLIDINESCREENURINE, PPXUR, ETOH  No results found for: LITHIUM, DILFRTOT, VALPROATE    Assessment:   Left cerebral stroke with subtle right-sided findings with word finding difficulty. Patient has multiple risk factors for cerebrovascular disease. 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 Patient was not on antiplatelet agent.   I recommended that we start him on splint for now and Lipitor and complete his work-up with a MRI of the brain and carotids. 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  We will hold off the Plavix for now as he may require urgent intervention. MRI of the brain showed multifocal acute/subacute CVA in left MCA distribution. CTA of the neck showed diffuse stenosis of the left internal carotid artery. Approximately 60% stenosis of the proximal internal carotid artery. U/s of the carotid artery also showed high grade stenosis in the proximal left internal carotid artery. CTA of the head showed mild diffuse narrowing of the distal left internal carotid artery. ECHO with bubble study has not been completed. Will obtain hypercoagulable work up given patient's age. Will obtain work up for vasculitis given concern. A1c 9  LDL 53  Will add Plavix for DAPT given the severity of stenosis. Will order MEERA with bubble study and cardiology consult for PFO given age. Will consult Dr. Ramon Pickard regarding left internal carotid stenosis. Likely not a candidate for endarterectomy given location in internal carotid artery, but Dr. Anjel Gómez would like to discuss with him. Remains nonfocal his examination and ambulatory. Patient has left carotid artery stenosis intra and extracranial though this cannot be certain without a cerebral angiogram.  We have discussed this findings with Dr. Ramon Pickard and this will be performed on Tuesday in the interim we will arrange for a MEERA    Patient to undergo angiogram to further assess for left carotid stenosis tomorrow.   MEERA with bubble study pending  CRP less than 3  Sed rate 9  MPO pending  Hypercoagulable work-up pending  Blood pressure control improved  Continue dual antiplatelet therapy And high intensity statin    I have personally performed a face to face diagnostic evaluation on this patient, reviewed all data and investigations, and am the sole provider of all clinical decisions on the neurological status of this patient.events noted, left arteriogram and subsequent intervention, Hgba1c is high      Jose C Nath MD, Todd Macdonald, American Board of Psychiatry & Neurology  Board Certified in Vascular Neurology  Board Certified in Neuromuscular Medicine  Certified in [facility]:

## 2022-01-03 NOTE — PROGRESS NOTES
Gag reflex assessed and intact. Pt given water and juice. VSS. Report given to [facility], RN.  Transport requested for pt to return to

## 2022-01-03 NOTE — PROGRESS NOTES
Daily Update    From:HOME W/SPOUSE    PMH:HTN, DM    Anticipated Discharge Date:1/1/22    Anticipated Discharge Disposition:RETURN HOME W/SPOUSE    Patient Mobility or PT/OT ordered:    Covid Test Date and Result:12/31 NEG    CONS: ZOLLI, DAS, CARDIO    Barriers to Discharge:NEURO CLEARANCE  PT DOES NOT HAVE RX COVERAGE/INS.- WILL FOLLOW FOR NEW MEDS. PT STATES CAN AFFORD MEDICATIONS.  OVER INCOME FOR MEDS  1/3 MEERA  1/4 DIAGNOSTIC ONLY CAROTID DSA    Readmission Risk              Risk of Unplanned Readmission:  9         CMI DONE

## 2022-01-03 NOTE — PROGRESS NOTES
INPATIENT PROGRESS NOTES    PATIENT NAME: Cole Cuba  MRN: [id number]  SERVICE DATE:  January 3, 2022   SERVICE TIME:  1:38 PM      PRIMARY SERVICE: Vascular Surgery    CHIEF COMPLAINTS:    INTERVAL HPI: Patient seen and examined at bedside, Interval Notes, orders reviewed. Nursing notes noted      OBJECTIVE    Body mass index is 35.44 kg/m². PHYSICAL EXAM:  Vitals:  /82   Pulse 92   Temp 97.4 °F (36.3 °C) (Oral)   Resp 18   Ht 5' 9\" (1.753 m)   Wt 240 lb (108.9 kg)   SpO2 98%   BMI 35.44 kg/m²   General:   Having MEERA presently.`  Head: Atraumatic , Normocephalic   Eyes: PERRL. No icteric sclera. No conjunctival injection. No discharge   ENT: No nasal  discharge. Pharynx clear. Neck:  Trachea midline. No thyromegaly, no JVD, No cervical adenopathy. ABD: Benign. Non-tender. Non-distended. No masses or organmegaly. Normal bowel sounds. EXT: Palpable right femoral pulse. Neuro: no focal weakness  Skin: Warm and dry. No erythema or rash on exposed extremities. DATA:   Recent Labs     01/02/22  0705 01/03/22  0740   WBC 6.7 7.3   HGB 13.3* 14.1   HCT 39.2* 41.6*   MCV 87.9 88.4    208     Recent Labs     01/02/22  0705 01/02/22  0705 01/03/22  0740     --  141   K 4.2  --  4.6     --  104   CO2 25  --  25   BUN 18  --  21*   CREATININE 0.55*  --  0.55*   GLUCOSE 191*  --  190*   CALCIUM 9.2  --  9.7   PROT 6.8  --  7.6   LABALBU 3.8  --  4.2   BILITOT 0.4  --  0.4   ALKPHOS 44  --  50   AST 14  --  15   ALT 21   < > 20   LABGLOM >60.0   < > >60.0   GFRAA >60.0   < > >60.0   GLOB 3.0   < > 3.4    < > = values in this interval not displayed. No results for input(s): PHART, ZMA6MAD, PO2ART, UFI0KOZ, BEART, X3GNHTLK in the last 72 hours.     O2 Device: None (Room air)    Diet NPO Exceptions are: Sips of Water with Meds     MEDICATIONS during current hospitalization:    Continuous Infusions:   sodium chloride      sodium chloride         Scheduled Meds:   sodium chloride flush  5-40 mL IntraVENous 2 times per day    clopidogrel  75 mg Oral Daily    metFORMIN  750 mg Oral TID    glipiZIDE  10 mg Oral BID AC    lisinopril  10 mg Oral Daily    insulin lispro  0-6 Units SubCUTAneous TID WC    insulin lispro  0-3 Units SubCUTAneous Nightly    aspirin  81 mg Oral Daily    atorvastatin  40 mg Oral Nightly    pantoprazole  40 mg Oral QAM AC    enoxaparin  40 mg SubCUTAneous Daily       PRN Meds:sodium chloride flush, sodium chloride, hydrALAZINE    Radiology  ECHO Complete With Bubble Study    Result Date: 1/2/2022  Transthoracic Echocardiography Report (TTE)  Demographics   Patient Name   Jerome Levels    Gender              Male                 RITA   Patient Number [id number]            Race                                                      Ethnicity   Visit Number   [id number]           Room Number         C501   Corporate ID                       Date of Study       01/01/2022   Accession      [id number]          Referring Physician Amor Reed MD  Number   Date of Birth  1970          Sonographer         Angelika Rojo [facility]   Age            45 year(s)          Interpreting        [facility]                                     Physician           Cardiology                                                         [address] [place] [address]  Procedure Type of Study   TTE procedure:ECHOCARDIOGRAM COMPLETE WITH BUBBLE STUDY. Procedure Date Date: 01/01/2022 Start: 10:10 AM Study Location: Portable Technical Quality: Adequate visualization Indications:LVF. Patient Status: Routine Height: 69 inches Weight: 240 pounds BSA: 2.23 m^2 BMI: 35.44 kg/m^2 BP: 148/91 mmHg  Conclusions   Summary  Aortic valve leaflets are mildly thickened. No AS. Left ventricular ejection fraction is visually estimated at 55%. Mild concentric left ventricular hypertrophy.    Signature   ----------------------------------------------------------------  Electronically signed by CEINT Ofelia(Interpreting physician)  on 01/02/2022 05:31 PM  ----------------------------------------------------------------   Findings  Left Ventricle Left ventricular ejection fraction is visually estimated at 55%. Mild concentric left ventricular hypertrophy. Right Ventricle Normal right ventricle structure and function. Normal right ventricle systolic pressure. Left Atrium Normal left atrium. Right Atrium Normal right atrium. Mitral Valve Normal mitral valve structure and function. Tricuspid Valve Normal tricuspid valve structure and function. Aortic Valve Aortic valve leaflets are mildly thickened. No AS. Pulmonic Valve Normal pulmonic valve structure and function. Pericardial Effusion No evidence of pericardial effusion. Pleural Effusion No evidence of pleural effusion. Aorta \ Miscellaneous Miscellaneous normal findings were found.  M-Mode Measurements (cm)   LVIDd: 5 cm               LVIDs: 3.59 cm  IVSd: 1.23 cm             IVSs: 1.41 cm  LVPWd: 1.17 cm            LVPWs: 1.53 cm                            AO Root Dimension: 3.26 cm                            ACS: 2.22 cm                            LVOT: 2.45 cm  Doppler Measurements:   AV Velocity:0.02 m/s                    MV Peak E-Wave: 0.67 m/s  AV Peak Gradient: 17.69 mmHg            MV Peak A-Wave: 0.55 m/s  AV Mean Gradient: 7.65 mmHg  AV Area (Continuity):2.37 cm^2  TR Velocity:2.12 m/s                    Estimated RAP:3 mmHg  TR Gradient:17.92 mmHg                  RVSP:20.92 mmHg  Valves  Mitral Valve   Peak E-Wave: 0.67 m/s              Peak A-Wave: 0.55 m/s                                     E/A Ratio: 1.21                                     Peak Gradient: 1.78 mmHg   Aortic Valve   Peak Velocity: 2.1 m/s                 Mean Velocity: 1.3 m/s  Peak Gradient: 17.69 mmHg              Mean Gradient: 7.65 mmHg  Area (continuity): 2.37 cm^2  AV VTI: 39.66 cm   Cusp Separation: 2.22 cm   Tricuspid Valve   Estimated RVSP: 20.92 mmHg Estimated RAP: 3 mmHg  TR Velocity: 2.12 m/s                   TR Gradient: 17.92 mmHg   Pulmonic Valve   Peak Velocity: 0.98 m/s           Peak Gradient: 3.85 mmHg                                    Estimated PASP: 20.92 mmHg   LVOT   Peak Velocity: 0.94 m/s              Mean Velocity: 0.71 m/s  Peak Gradient: 3.35 mmHg             Mean Gradient: 2.22 mmHg  LVOT Diameter: 2.45 cm               LVOT VTI: 19.94 cm  Structures  Left Atrium   LA Volume/Index: 72.41 ml /32 m^2             LA Area: 19.83 cm^2   Left Ventricle   Diastolic Dimension: 5 cm             Systolic Dimension: 9.68 cm  Septum Diastolic: 2.27 cm             Septum Systolic: 0.96 cm  PW Diastolic: 3.51 cm                 PW Systolic: 1.99 cm                                        FS: 28.2 %  LV EDV/LV EDV Index: 118.31 ml/53 m^2 LV ESV/LV ESV Index: 54.11 ml/24 m^2  EF Calculated: 54.3 %   LVOT Diameter: 2.45 cm   Right Atrium   RA Systolic Pressure: 3 mmHg   Right Ventricle            RV Systolic Pressure: 31.62 mmHg  Aorta/ Miscellaneous Aorta   Aortic Root: 3.26 cm  LVOT Diameter: 2.45 cm      CTA HEAD W WO CONTRAST    Result Date: 12/31/2021  EXAM: CTA NECK W AND OR WO CONTRAST EXAM: CTA HEAD W AND OR WO CONTRAST INDICATION:   high grade carotid stenosis COMPARISON: Same day bilateral carotid ultrasounds and MR brain. TECHNIQUE: Helically acquired axial images were obtained from the aortic arch through the vertex at 6.75 mm slice thickness. Sagittal and coronal reconstructed images were generated. Subsequent MIP images were rendered using a separate 3-D workstation. All CT scans at this facility use dose modulation, iterative reconstruction, and/or weight based dosing when appropriate to reduce radiation dose to as low as reasonably achievable. CONTRAST: A total of 100 mL of Isovue-300 was given intravenously. FINDINGS: The vertebral arteries have normal course, contour, and caliber throughout the cervical portions.  The vertebrobasilar system is normal.  Normal contrast enhancement is present within both vertebral arteries, the basilar artery, and both posterior cerebral arteries. The common carotid arteries have normal course, contour, and caliber. The external carotid arteries have normal contrast enhancement. There are noncalcified atherosclerotic plaques involving the left carotid bulb and the proximal internal carotid artery extending to the cervical portions, with resultant diffuse stenosis of the internal carotid artery. There is approximately 60% stenosis of the proximal internal carotid artery. There is mild diffuse narrowing of the distal left internal carotid artery, involving the distal cervical, petrous, cavernous and supraclinoid portions, resulting in less than 50% stenosis. There is no significant stenosis or occlusion of the right internal carotid artery and the right distal internal carotid artery. There is normal flow signal in the A1 and A2 segments bilaterally. The M1 and M2 segments also have normal flow signal bilaterally. There is no focal stenosis, occlusion, or aneurysm demonstrated in the head. There are multiple soft tissue scalp nodules. There is partial opacification of the bilateral maxillary and bilateral ethmoid air cells. The mastoids air cells are well aerated. The visualized orbits are unremarkable. The known left MCA infarcts are better visualized on the same day MR brain. There are scattered degenerative changes of the cervical spine. The upper airway is patent. CTA Head 1. Mild diffuse narrowing of the distal left internal carotid artery. CTA Neck 1. There are noncalcified atherosclerotic plaques resulting in diffuse narrowing of the left internal carotid artery extending to the cranially, with approximately 60% stenosis at the proximal segment. However, inflammation/vasculitis may result in similar findings.     XR CHEST (2 VW)    Result Date: 12/31/2021  EXAMINATION: XR CHEST (2 VW) CLINICAL HISTORY: DYSARTHRIA COMPARISONS: None available. FINDINGS: Osseous structures are intact. Cardiopericardial silhouette is normal. Pulmonary vasculature is normal. Lungs are clear. NO ACUTE CARDIOPULMONARY DISEASE. CT HEAD WO CONTRAST            IMPRESSION AND SUGGESTION:  Right Femoral approach Selective Carotid DSA 01/04. I explained via phone 01/02 to Sendy kunz) the intended procedure. I met with Sendy Ruiz today in the room while the patient is undergoing his MEERA. I have explained all the details of a diagnostic ONLY Carotid DSA 01/04. All questions have been answered. Orders entered into Epic.           Electronically signed by Vera Puckett MD on 1/3/2022 at 1:38 PM

## 2022-01-03 NOTE — BRIEF OP NOTE
Section of Cardiology  Adult Brief Procedure Note        Procedure(s):  MEERA with bubble study    Pre-operative Diagnosis:  CVA    H&P Status: Completed and reviewed.      Post-operative Diagnosis:  CVA    Findings: see full report  EF 60%  No major valvular abnormalities  Negative bubble study No PFO no ASD  No MEJIA thrombus      Complications:  None    Recommendation:  Continue Pos MEERA recs  Cont supportive care for CVA as per neuro       Primary Proceduralist:  Gio Perdomo MD         Full procedure note to follow

## 2022-01-04 ENCOUNTER — APPOINTMENT (OUTPATIENT)
Dept: GENERAL RADIOLOGY | Age: 52
DRG: 065 | End: 2022-01-04

## 2022-01-04 ENCOUNTER — ANESTHESIA (OUTPATIENT)
Dept: OPERATING ROOM | Age: 52
DRG: 065 | End: 2022-01-04

## 2022-01-04 VITALS — DIASTOLIC BLOOD PRESSURE: 59 MMHG | OXYGEN SATURATION: 96 % | TEMPERATURE: 74.3 F | SYSTOLIC BLOOD PRESSURE: 102 MMHG

## 2022-01-04 LAB
ALBUMIN SERPL-MCNC: 3.8 G/DL (ref 3.5–4.6)
ALP BLD-CCNC: 47 U/L (ref 35–104)
ALT SERPL-CCNC: 19 U/L (ref 0–41)
ANION GAP SERPL CALCULATED.3IONS-SCNC: 12 MEQ/L (ref 9–15)
AST SERPL-CCNC: 14 U/L (ref 0–40)
BASOPHILS ABSOLUTE: 0 K/UL (ref 0–0.2)
BASOPHILS RELATIVE PERCENT: 0.4 %
BILIRUB SERPL-MCNC: 0.4 MG/DL (ref 0.2–0.7)
BUN BLDV-MCNC: 22 MG/DL (ref 6–20)
CALCIUM SERPL-MCNC: 9.3 MG/DL (ref 8.5–9.9)
CHLORIDE BLD-SCNC: 102 MEQ/L (ref 95–107)
CO2: 25 MEQ/L (ref 20–31)
CREAT SERPL-MCNC: 0.66 MG/DL (ref 0.7–1.2)
EOSINOPHILS ABSOLUTE: 0.2 K/UL (ref 0–0.7)
EOSINOPHILS RELATIVE PERCENT: 2.9 %
GFR AFRICAN AMERICAN: >60
GFR NON-AFRICAN AMERICAN: >60
GLOBULIN: 2.9 G/DL (ref 2.3–3.5)
GLUCOSE BLD-MCNC: 121 MG/DL (ref 60–115)
GLUCOSE BLD-MCNC: 147 MG/DL (ref 70–99)
GLUCOSE BLD-MCNC: 164 MG/DL (ref 60–115)
GLUCOSE BLD-MCNC: 184 MG/DL (ref 60–115)
GLUCOSE BLD-MCNC: 212 MG/DL (ref 60–115)
HCT VFR BLD CALC: 37.3 % (ref 42–52)
HEMOGLOBIN: 12.9 G/DL (ref 14–18)
LYMPHOCYTES ABSOLUTE: 2.2 K/UL (ref 1–4.8)
LYMPHOCYTES RELATIVE PERCENT: 27.4 %
MCH RBC QN AUTO: 30.1 PG (ref 27–31.3)
MCHC RBC AUTO-ENTMCNC: 34.5 % (ref 33–37)
MCV RBC AUTO: 87.3 FL (ref 80–100)
MONOCYTES ABSOLUTE: 0.8 K/UL (ref 0.2–0.8)
MONOCYTES RELATIVE PERCENT: 10.3 %
NEUTROPHILS ABSOLUTE: 4.8 K/UL (ref 1.4–6.5)
NEUTROPHILS RELATIVE PERCENT: 59 %
PDW BLD-RTO: 12.4 % (ref 11.5–14.5)
PERFORMED ON: ABNORMAL
PLATELET # BLD: 199 K/UL (ref 130–400)
POTASSIUM SERPL-SCNC: 4.2 MEQ/L (ref 3.4–4.9)
RBC # BLD: 4.28 M/UL (ref 4.7–6.1)
SODIUM BLD-SCNC: 139 MEQ/L (ref 135–144)
TOTAL PROTEIN: 6.7 G/DL (ref 6.3–8)
WBC # BLD: 8.1 K/UL (ref 4.8–10.8)

## 2022-01-04 PROCEDURE — 6360000002 HC RX W HCPCS: Performed by: STUDENT IN AN ORGANIZED HEALTH CARE EDUCATION/TRAINING PROGRAM

## 2022-01-04 PROCEDURE — C1887 CATHETER, GUIDING: HCPCS | Performed by: THORACIC SURGERY (CARDIOTHORACIC VASCULAR SURGERY)

## 2022-01-04 PROCEDURE — 99233 SBSQ HOSP IP/OBS HIGH 50: CPT | Performed by: PSYCHIATRY & NEUROLOGY

## 2022-01-04 PROCEDURE — 1210000000 HC MED SURG R&B

## 2022-01-04 PROCEDURE — 2580000003 HC RX 258: Performed by: THORACIC SURGERY (CARDIOTHORACIC VASCULAR SURGERY)

## 2022-01-04 PROCEDURE — B315ZZZ FLUOROSCOPY OF BILATERAL COMMON CAROTID ARTERIES: ICD-10-PCS | Performed by: THORACIC SURGERY (CARDIOTHORACIC VASCULAR SURGERY)

## 2022-01-04 PROCEDURE — 6360000002 HC RX W HCPCS: Performed by: THORACIC SURGERY (CARDIOTHORACIC VASCULAR SURGERY)

## 2022-01-04 PROCEDURE — C1894 INTRO/SHEATH, NON-LASER: HCPCS | Performed by: THORACIC SURGERY (CARDIOTHORACIC VASCULAR SURGERY)

## 2022-01-04 PROCEDURE — 85025 COMPLETE CBC W/AUTO DIFF WBC: CPT

## 2022-01-04 PROCEDURE — 3700000001 HC ADD 15 MINUTES (ANESTHESIA): Performed by: THORACIC SURGERY (CARDIOTHORACIC VASCULAR SURGERY)

## 2022-01-04 PROCEDURE — 80053 COMPREHEN METABOLIC PANEL: CPT

## 2022-01-04 PROCEDURE — 3700000000 HC ANESTHESIA ATTENDED CARE: Performed by: THORACIC SURGERY (CARDIOTHORACIC VASCULAR SURGERY)

## 2022-01-04 PROCEDURE — 36415 COLL VENOUS BLD VENIPUNCTURE: CPT

## 2022-01-04 PROCEDURE — 7100000001 HC PACU RECOVERY - ADDTL 15 MIN: Performed by: THORACIC SURGERY (CARDIOTHORACIC VASCULAR SURGERY)

## 2022-01-04 PROCEDURE — 6370000000 HC RX 637 (ALT 250 FOR IP): Performed by: INTERNAL MEDICINE

## 2022-01-04 PROCEDURE — C1760 CLOSURE DEV, VASC: HCPCS | Performed by: THORACIC SURGERY (CARDIOTHORACIC VASCULAR SURGERY)

## 2022-01-04 PROCEDURE — 3600000002 HC SURGERY LEVEL 2 BASE: Performed by: THORACIC SURGERY (CARDIOTHORACIC VASCULAR SURGERY)

## 2022-01-04 PROCEDURE — 3600000012 HC SURGERY LEVEL 2 ADDTL 15MIN: Performed by: THORACIC SURGERY (CARDIOTHORACIC VASCULAR SURGERY)

## 2022-01-04 PROCEDURE — APPSS15 APP SPLIT SHARED TIME 0-15 MINUTES: Performed by: NURSE PRACTITIONER

## 2022-01-04 PROCEDURE — C1725 CATH, TRANSLUMIN NON-LASER: HCPCS | Performed by: THORACIC SURGERY (CARDIOTHORACIC VASCULAR SURGERY)

## 2022-01-04 PROCEDURE — 6360000004 HC RX CONTRAST MEDICATION: Performed by: THORACIC SURGERY (CARDIOTHORACIC VASCULAR SURGERY)

## 2022-01-04 PROCEDURE — B31QZZZ FLUOROSCOPY OF CERVICO-CEREBRAL ARCH: ICD-10-PCS | Performed by: THORACIC SURGERY (CARDIOTHORACIC VASCULAR SURGERY)

## 2022-01-04 PROCEDURE — B318ZZZ FLUOROSCOPY OF BILATERAL INTERNAL CAROTID ARTERIES: ICD-10-PCS | Performed by: THORACIC SURGERY (CARDIOTHORACIC VASCULAR SURGERY)

## 2022-01-04 PROCEDURE — 2580000003 HC RX 258: Performed by: STUDENT IN AN ORGANIZED HEALTH CARE EDUCATION/TRAINING PROGRAM

## 2022-01-04 PROCEDURE — 2709999900 HC NON-CHARGEABLE SUPPLY: Performed by: THORACIC SURGERY (CARDIOTHORACIC VASCULAR SURGERY)

## 2022-01-04 PROCEDURE — 7100000000 HC PACU RECOVERY - FIRST 15 MIN: Performed by: THORACIC SURGERY (CARDIOTHORACIC VASCULAR SURGERY)

## 2022-01-04 PROCEDURE — 76000 FLUOROSCOPY <1 HR PHYS/QHP: CPT

## 2022-01-04 RX ORDER — ONDANSETRON 2 MG/ML
INJECTION INTRAMUSCULAR; INTRAVENOUS PRN
Status: DISCONTINUED | OUTPATIENT
Start: 2022-01-04 | End: 2022-01-04 | Stop reason: SDUPTHER

## 2022-01-04 RX ORDER — FENTANYL CITRATE 50 UG/ML
50 INJECTION, SOLUTION INTRAMUSCULAR; INTRAVENOUS EVERY 10 MIN PRN
Status: DISCONTINUED | OUTPATIENT
Start: 2022-01-04 | End: 2022-01-04 | Stop reason: HOSPADM

## 2022-01-04 RX ORDER — SODIUM CHLORIDE 0.9 % (FLUSH) 0.9 %
10 SYRINGE (ML) INJECTION PRN
Status: DISCONTINUED | OUTPATIENT
Start: 2022-01-04 | End: 2022-01-04 | Stop reason: HOSPADM

## 2022-01-04 RX ORDER — ACETAMINOPHEN 80 MG
TABLET,CHEWABLE ORAL ONCE
Status: DISCONTINUED | OUTPATIENT
Start: 2022-01-04 | End: 2022-01-05 | Stop reason: HOSPADM

## 2022-01-04 RX ORDER — SODIUM CHLORIDE 0.9 % (FLUSH) 0.9 %
5-40 SYRINGE (ML) INJECTION PRN
Status: DISCONTINUED | OUTPATIENT
Start: 2022-01-04 | End: 2022-01-05 | Stop reason: HOSPADM

## 2022-01-04 RX ORDER — PROPOFOL 10 MG/ML
INJECTION, EMULSION INTRAVENOUS CONTINUOUS PRN
Status: DISCONTINUED | OUTPATIENT
Start: 2022-01-04 | End: 2022-01-04 | Stop reason: SDUPTHER

## 2022-01-04 RX ORDER — HYDROCODONE BITARTRATE AND ACETAMINOPHEN 5; 325 MG/1; MG/1
2 TABLET ORAL PRN
Status: DISCONTINUED | OUTPATIENT
Start: 2022-01-04 | End: 2022-01-04 | Stop reason: HOSPADM

## 2022-01-04 RX ORDER — SODIUM CHLORIDE 0.9 % (FLUSH) 0.9 %
5-40 SYRINGE (ML) INJECTION EVERY 12 HOURS SCHEDULED
Status: DISCONTINUED | OUTPATIENT
Start: 2022-01-04 | End: 2022-01-05 | Stop reason: HOSPADM

## 2022-01-04 RX ORDER — SODIUM CHLORIDE 9 MG/ML
INJECTION, SOLUTION INTRAVENOUS CONTINUOUS
Status: ACTIVE | OUTPATIENT
Start: 2022-01-04 | End: 2022-01-04

## 2022-01-04 RX ORDER — METOCLOPRAMIDE HYDROCHLORIDE 5 MG/ML
10 INJECTION INTRAMUSCULAR; INTRAVENOUS
Status: DISCONTINUED | OUTPATIENT
Start: 2022-01-04 | End: 2022-01-04 | Stop reason: HOSPADM

## 2022-01-04 RX ORDER — SODIUM CHLORIDE 9 MG/ML
INJECTION, SOLUTION INTRAVENOUS CONTINUOUS PRN
Status: DISCONTINUED | OUTPATIENT
Start: 2022-01-04 | End: 2022-01-04 | Stop reason: SDUPTHER

## 2022-01-04 RX ORDER — SODIUM CHLORIDE 9 MG/ML
25 INJECTION, SOLUTION INTRAVENOUS PRN
Status: DISCONTINUED | OUTPATIENT
Start: 2022-01-04 | End: 2022-01-04 | Stop reason: HOSPADM

## 2022-01-04 RX ORDER — SODIUM CHLORIDE, SODIUM LACTATE, POTASSIUM CHLORIDE, CALCIUM CHLORIDE 600; 310; 30; 20 MG/100ML; MG/100ML; MG/100ML; MG/100ML
INJECTION, SOLUTION INTRAVENOUS CONTINUOUS
Status: DISCONTINUED | OUTPATIENT
Start: 2022-01-04 | End: 2022-01-04

## 2022-01-04 RX ORDER — SODIUM CHLORIDE 0.9 % (FLUSH) 0.9 %
10 SYRINGE (ML) INJECTION EVERY 12 HOURS SCHEDULED
Status: DISCONTINUED | OUTPATIENT
Start: 2022-01-04 | End: 2022-01-04 | Stop reason: HOSPADM

## 2022-01-04 RX ORDER — LIDOCAINE HYDROCHLORIDE 10 MG/ML
1 INJECTION, SOLUTION EPIDURAL; INFILTRATION; INTRACAUDAL; PERINEURAL
Status: DISCONTINUED | OUTPATIENT
Start: 2022-01-04 | End: 2022-01-04 | Stop reason: HOSPADM

## 2022-01-04 RX ORDER — SODIUM CHLORIDE 9 MG/ML
25 INJECTION, SOLUTION INTRAVENOUS PRN
Status: DISCONTINUED | OUTPATIENT
Start: 2022-01-04 | End: 2022-01-05 | Stop reason: HOSPADM

## 2022-01-04 RX ORDER — HYDROCODONE BITARTRATE AND ACETAMINOPHEN 5; 325 MG/1; MG/1
1 TABLET ORAL PRN
Status: DISCONTINUED | OUTPATIENT
Start: 2022-01-04 | End: 2022-01-04 | Stop reason: HOSPADM

## 2022-01-04 RX ORDER — DIPHENHYDRAMINE HYDROCHLORIDE 50 MG/ML
12.5 INJECTION INTRAMUSCULAR; INTRAVENOUS
Status: DISCONTINUED | OUTPATIENT
Start: 2022-01-04 | End: 2022-01-04 | Stop reason: HOSPADM

## 2022-01-04 RX ORDER — MAGNESIUM HYDROXIDE 1200 MG/15ML
LIQUID ORAL CONTINUOUS PRN
Status: COMPLETED | OUTPATIENT
Start: 2022-01-04 | End: 2022-01-04

## 2022-01-04 RX ORDER — ONDANSETRON 2 MG/ML
4 INJECTION INTRAMUSCULAR; INTRAVENOUS EVERY 6 HOURS PRN
Status: DISCONTINUED | OUTPATIENT
Start: 2022-01-04 | End: 2022-01-05 | Stop reason: HOSPADM

## 2022-01-04 RX ORDER — ONDANSETRON 2 MG/ML
4 INJECTION INTRAMUSCULAR; INTRAVENOUS
Status: DISCONTINUED | OUTPATIENT
Start: 2022-01-04 | End: 2022-01-04 | Stop reason: HOSPADM

## 2022-01-04 RX ORDER — MEPERIDINE HYDROCHLORIDE 25 MG/ML
12.5 INJECTION INTRAMUSCULAR; INTRAVENOUS; SUBCUTANEOUS EVERY 5 MIN PRN
Status: DISCONTINUED | OUTPATIENT
Start: 2022-01-04 | End: 2022-01-04 | Stop reason: HOSPADM

## 2022-01-04 RX ADMIN — ATORVASTATIN CALCIUM 40 MG: 40 TABLET, FILM COATED ORAL at 21:08

## 2022-01-04 RX ADMIN — SODIUM CHLORIDE, PRESERVATIVE FREE 10 ML: 5 INJECTION INTRAVENOUS at 21:07

## 2022-01-04 RX ADMIN — ONDANSETRON 4 MG: 2 INJECTION INTRAMUSCULAR; INTRAVENOUS at 11:29

## 2022-01-04 RX ADMIN — PROPOFOL 70 MCG/KG/MIN: 10 INJECTION, EMULSION INTRAVENOUS at 11:05

## 2022-01-04 RX ADMIN — METFORMIN HYDROCHLORIDE 750 MG: 500 TABLET ORAL at 17:07

## 2022-01-04 RX ADMIN — METFORMIN HYDROCHLORIDE 750 MG: 500 TABLET ORAL at 21:08

## 2022-01-04 RX ADMIN — GLIPIZIDE 10 MG: 10 TABLET ORAL at 17:07

## 2022-01-04 RX ADMIN — CEFAZOLIN 2 MG: 10 INJECTION, POWDER, FOR SOLUTION INTRAVENOUS at 11:00

## 2022-01-04 RX ADMIN — SODIUM CHLORIDE: 9 INJECTION, SOLUTION INTRAVENOUS at 11:00

## 2022-01-04 ASSESSMENT — ENCOUNTER SYMPTOMS
BACK PAIN: 0
TROUBLE SWALLOWING: 0
DIARRHEA: 0
WHEEZING: 0
SHORTNESS OF BREATH: 0
COLOR CHANGE: 0
VOMITING: 0
NAUSEA: 0
COUGH: 0
CHOKING: 0
PHOTOPHOBIA: 0

## 2022-01-04 ASSESSMENT — PULMONARY FUNCTION TESTS
PIF_VALUE: 4
PIF_VALUE: 0
PIF_VALUE: 0
PIF_VALUE: 3
PIF_VALUE: 3
PIF_VALUE: 5
PIF_VALUE: 1
PIF_VALUE: 4
PIF_VALUE: 0
PIF_VALUE: 4
PIF_VALUE: 0
PIF_VALUE: 4
PIF_VALUE: 1
PIF_VALUE: 1
PIF_VALUE: 0
PIF_VALUE: 1
PIF_VALUE: 1
PIF_VALUE: 4
PIF_VALUE: 3
PIF_VALUE: 4
PIF_VALUE: 3
PIF_VALUE: 4
PIF_VALUE: 0
PIF_VALUE: 2
PIF_VALUE: 4
PIF_VALUE: 1
PIF_VALUE: 4
PIF_VALUE: 3
PIF_VALUE: 0
PIF_VALUE: 4
PIF_VALUE: 3
PIF_VALUE: 4
PIF_VALUE: 0
PIF_VALUE: 1
PIF_VALUE: 4
PIF_VALUE: 0
PIF_VALUE: 3
PIF_VALUE: 1
PIF_VALUE: 0

## 2022-01-04 ASSESSMENT — PAIN - FUNCTIONAL ASSESSMENT: PAIN_FUNCTIONAL_ASSESSMENT: 0-10

## 2022-01-04 NOTE — PROGRESS NOTES
Right groin dressing clean and dry, site soft, bilateral pedal pulses palpable. Feet mobile and warm.

## 2022-01-04 NOTE — PROGRESS NOTES
Right groin dressing clean and dry, site soft and without swelling. Bilateral pedal pulses palpable. Feet warm and mobile. Pt denies complaint at this time. Denies need to void at this time. SAO2 96% on room air. mp rsr.

## 2022-01-04 NOTE — PROGRESS NOTES
Right groin aquacel dressing clean and dry, site soft and without swelling, right and left pedals palpable, Pt awake and alert, denies pain, nausea. Or dyspnea. At this time.

## 2022-01-04 NOTE — PROGRESS NOTES
Received from or into pacu in a bed accompanied in by CRNA. Oral airway in place with O2 on at 8L mask, breath sounds clear t o anterior auscultation, SAO2 99%. MP RSR  Right groin aquaseal dressing noted clean dry and intact, site soft and without swelling. Right and left pedal pulses noted palpable at 2+. Feet warm and mobile.

## 2022-01-04 NOTE — PROGRESS NOTES
Dr Maddy Rivero in to see patient. Patient awake and interacting with doctor, Right groin dressing clean, site soft, pedals palpable, Pt moving times four, Denies complaint at this time.

## 2022-01-04 NOTE — PROGRESS NOTES
Dr Dwyer Haver in to see patient. Patient awake and responsive, interacting with doctor, Pt moving ties four with right side noted slightly weaker than left.

## 2022-01-04 NOTE — PROGRESS NOTES
Progress Note  Date:2022       Room:[facility] OR Pool/NONE  Patient Traci Essex     YOB: 1970     Age:51 y.o. Subjective    Subjective:  Symptoms:  No shortness of breath, malaise, cough, chest pain, weakness, headache, chest pressure, anorexia, diarrhea or anxiety. Diet:  No nausea or vomiting. Review of Systems   Respiratory: Negative for cough and shortness of breath. Cardiovascular: Negative for chest pain. Gastrointestinal: Negative for anorexia, diarrhea, nausea and vomiting. Neurological: Negative for weakness. Objective         Vitals Last 24 Hours:  TEMPERATURE:  Temp  Av.3 °F (35.2 °C)  Min: 74.3 °F (23.5 °C)  Max: 98.7 °F (37.1 °C)  RESPIRATIONS RANGE: Resp  Av.5  Min: 0  Max: 20  PULSE OXIMETRY RANGE: SpO2  Av.4 %  Min: 92 %  Max: 100 %  PULSE RANGE: Pulse  Av.6  Min: 76  Max: 91  BLOOD PRESSURE RANGE: Systolic (01SJV), YBO:768 , Min:89 , FBQ:321   ; Diastolic (94BWA), SRJ:72, Min:52, Max:113    I/O (24Hr): Intake/Output Summary (Last 24 hours) at 2022 1224  Last data filed at 2022 1153  Gross per 24 hour   Intake 800 ml   Output    Net 800 ml     Objective:  General Appearance:  Comfortable, well-appearing and in no acute distress. Vital signs: (most recent): Blood pressure (!) 144/79, pulse 79, temperature 98.7 °F (37.1 °C), temperature source Temporal, resp. rate 12, height 5' 9\" (1.753 m), weight 240 lb (108.9 kg), SpO2 100 %. HEENT: Normal HEENT exam.    Lungs:  Normal effort. Heart: Normal rate. Abdomen: Abdomen is soft. Bowel sounds are normal.     Pulses: Distal pulses are intact. Pupils:  Pupils are equal, round, and reactive to light. Skin:  Warm.       Labs/Imaging/Diagnostics    Labs:  CBC:  Recent Labs     22  0705 22  0740 22  0622   WBC 6.7 7.3 8.1   RBC 4.46* 4.71 4.28*   HGB 13.3* 14.1 12.9*   HCT 39.2* 41.6* 37.3*   MCV 87.9 88.4 87.3   RDW 12.5 12.6 12.4   PLT 193 208 199     CHEMISTRIES:  Recent Labs     01/02/22  0705 01/03/22  0740 01/04/22  0622    141 139   K 4.2 4.6 4.2    104 102   CO2 25 25 25   BUN 18 21* 22*   CREATININE 0.55* 0.55* 0.66*   GLUCOSE 191* 190* 147*     PT/INR:  No results for input(s): PROTIME, INR in the last 72 hours. APTT:  No results for input(s): APTT in the last 72 hours. LIVER PROFILE:  Recent Labs     01/02/22  0705 01/03/22  0740 01/04/22  0622   AST 14 15 14   ALT 21 20 19   BILITOT 0.4 0.4 0.4   ALKPHOS 44 50 47       Imaging Last 24 Hours:  XR CHEST (2 VW)    Result Date: 12/31/2021  EXAMINATION: XR CHEST (2 VW) CLINICAL HISTORY: DYSARTHRIA COMPARISONS: None available. FINDINGS: Osseous structures are intact. Cardiopericardial silhouette is normal. Pulmonary vasculature is normal. Lungs are clear. NO ACUTE CARDIOPULMONARY DISEASE. CT HEAD WO CONTRAST    Result Date: 12/30/2021  EXAMINATION: CT of the brain without contrast HISTORY: Stroke. Dizziness. Slurred speech. COMPARISON: None available TECHNIQUE: Multiple contiguous axial images were obtained of the brain from the skull base through the vertex. Multiplanar reformats were obtained. FINDINGS: Prominence of the sulci and ventricles compatible with mild generalized parenchymal volume loss. Areas of bilateral supratentorial white matter hypoattenuation are nonspecific but most likely related to chronic small vessel ischemic changes in a patient of this age. Gray-white matter differentiation is preserved. No acute hemorrhage or abnormal extra-axial fluid collection. No mass effect or midline shift. Mild paranasal sinus mucosal thickening. The mastoid air cells are clear. Calvarium is intact. Two right frontal scalp lesions measure approximately 10 mm and 15 mm respectively. The posterior left scalp lesion measures approximately 5 mm and a left frontal scalp lesion measures approximately 5 mm. Clinical correlation is recommended. No acute intracranial process. All CT scans at this facility use dose modulation, iterative reconstruction, and/or weight based dosing when appropriate to reduce radiation dose to as low as reasonably achievable. MRI BRAIN WO CONTRAST    Result Date: 12/31/2021  EXAMINATION:  MRI BRAIN WO CONTRAST HISTORY:  New onset slurred speech TECHNIQUE:  MRI brain routine protocol without contrast. COMPARISON:  CT brain 12/30/2021 RESULT: Acute Change:  Multiple foci of restricted diffusion along the left centrum semiovale, corona radiata and frontal operculum compatible with acute/subacute left MCA distribution infarcts. Hemorrhage:  No evidence of prior parenchymal hemorrhage on the susceptibility weighted sequences. Mass Lesion/ Mass Effect:  No evidence of an intracranial mass or extra-axial fluid collection. No significant mass effect. Chronic Change:  Increased T2/FLAIR signal within the area of acute infarct in the left frontal lobe. Parenchyma:  No significant parenchymal volume loss for age. Ventricles:  Normal caliber and morphology. Skull Base:  Hypothalamic and pituitary region are grossly normal. Craniocervical junction is normal. No significant marrow replacement process. Vasculature:  Major intracranial arteries and dural venous sinuses demonstrate typical flow voids, suggesting patency by spin echo criteria. Other:  Bilateral maxillary sinus mucus retention cysts. Mild paranasal sinus mucosal thickening otherwise. Mastoid air cells are clear. The orbits are unremarkable. Multiple cutaneous soft tissue lesions predominantly along the right face. Multiple foci of left frontal lobe restricted diffusion compatible with acute/subacute Left MCA distribution infarct. No hemorrhage. COMMUNICATION:  Communicated with: Abdullahi Quinn , ER nurse on 12/31/2021 at 12:31 PM 12:31 PM.       US CAROTID ARTERY BILATERAL    Result Date: 12/31/2021  EXAMINATION: US CAROTID ARTERY BILATERAL HISTORY:   TIA vs CVA .  Slurred speech, right-sided weakness COMPARISON:None TECHNIQUE: Carotid duplex sonograms which include gray scale and color flow evaluation are complimented with spectral waveform analysis. Please refer to chart below for specific carotid velocity measurements. The degree of stenosis recorded on this exam uses the same method of stratification used in the NASCET trials. This complies with ACR practice guidelines and the Society of radiology in ultrasound consensus statement and provides adequate information for clinical decision making. Society of Radiologists in Ultrasound (SRU) consensus statement was used to estimate internal carotid artery stenosis. RESULT: There is moderate to severe plaque in the left internal carotid artery, predominantly involving the proximal segment. There is mild plaque in right internal carotid artery. There is turbulent flow at the level of the proximal left internal carotid artery indicating significant stenosis, with high resistant waveforms seen in the mid to distal left internal carotid artery. There is antegrade flow identified in both vertebral arteries. ARTERIAL BLOOD FLOW VELOCITY RIGHT PEAK SYSTOLIC VELOCITIES (PSV)                                               Prox CCA    65 cm/s             Mid CCA     79 cm/s              Dist CCA    56 cm/s           Prox ICA    46 cm/s               Mid ICA     106 cm/s            Dist ICA    81 cm/s             Prox ECA    75 cm/s             Prox VERT   76 cm/s              ICA/CCA     1.3                        LEFT PEAK SYSTOLIC VELOCITIES (PSV)  Prox CCA    101 cm/s Mid CCA     70 cm/s Dist CCA    54 cm/s Prox ICA    31 cm/s Mid ICA     46 cm/s Dist ICA    48 cm/s Prox ECA    131 cm/s Prox VERT   44 cm/s ICA/CCA     0.7     HIGH-GRADE STENOSIS OF THE PROXIMAL LEFT INTERNAL CAROTID ARTERY.   <50  % STENOSIS IN THE RIGHT ICA ANTEGRADE FLOW IN THE BILATERAL VERTEBRAL ARTERIES     Assessment//Plan           Hospital Problems           Last Modified POA    TIA (transient ischemic attack) 12/30/2021 Yes    Acute CVA (cerebrovascular accident) ([facility] [address] 12/31/2021 Yes    Cerebrovascular accident (CVA) due to stenosis of left carotid artery ([address] 1/2/2022 Yes    Stenosis of left carotid artery 1/3/2022 Yes    Aphasia 1/2/2022 Yes    Neurofibroma 1/2/2022 Yes      Carotid vascular dz    Assessment & Plan    12/31: Follow-up MRI, follow-up official report of CT angio head and neck. Patient has carotid vascular disease. Hemoglobin A1c is 9 counseling was given, made patient inpatient as per physician advisor recommendation. FU neuro recommendation  1/1: Patient was seen and evaluated. Vascular surgery is consulted for carodi vascular dz with CVA, FU cathleen, no overnight events, happy with the care. 1/2: CATHLEEN on Monday. Resume Metformin. No overnight events denies any needs, continue with current management. Anticipate discharge tomorrow.   1/3: pt was seen and evaluated, going for vascular surgery tomorrow, and possible CATHLEEN today, deneis any needs  1/4: s/p CATHLEEN negative for vegetation or thrombus, carotid vascular surgery today, FU pt/ot recommendation, pain is controlled  Electronically signed by Glenn Gruber MD on 12/31/21 at 1:05 PM EST

## 2022-01-04 NOTE — PROGRESS NOTES
Daily Update    From:HOME W/SPOUSE    PMH:HTN, DM    Anticipated Discharge Date:1/1/22    Anticipated Discharge Disposition:RETURN HOME W/SPOUSE    Patient Mobility or PT/OT ordered: NEEDS PT/OT/ST EVALS ORDERED    Covid Test Date and Result:12/31 NEG    CONS: ZOLLI, DAS, CARDIO    Barriers to Discharge:NEURO CLEARANCE  PT DOES NOT HAVE RX COVERAGE/INS.- WILL FOLLOW FOR NEW MEDS. PT STATES CAN AFFORD MEDICATIONS.  OVER INCOME FOR MEDS    1/4 DIAGNOSTIC ONLY CAROTID DSA    Readmission Risk              Risk of Unplanned Readmission:  9         CMI DONE

## 2022-01-04 NOTE — BRIEF OP NOTE
Brief Postoperative Note      Patient: Heather Nolasco  YOB: 1970  MRN: [id number]    Date of Procedure: 1/4/2022    Pre-Op Diagnosis: CAROTID STENOSIS    Post-Op Diagnosis: SAME   Procedure : Selective Bilateral Carotid DSA via Right Femoral Artery    Surgeon(s):  Deya Miranda MD    Assistant:STELLA Choudhury      Anesthesia: General    Estimated Blood Loss (mL): 0 cc    Complications: none    Specimens:   * No specimens in log *    Implants:  * No implants in log *      Drains: * No LDAs found *    Findings: dictated    Electronically signed by Deya Miranda MD on 1/4/2022 at 11:41 AM

## 2022-01-04 NOTE — ANESTHESIA PRE PROCEDURE
Department of Anesthesiology  Preprocedure Note       Name:  Soledad Butterfield   Age:  45 y.o.  :  1970                                          MRN:  [id number]         Date:  2022      Surgeon: Salvador Durbin):  Logan Hopkins MD    Procedure: Procedure(s):  CAROTID DIGITAL SUBTRACTION ARTERIOGRAM VIA RIGHT FEMORAL ARTERY ROOM 283    Medications prior to admission:   Prior to Admission medications    Medication Sig Start Date End Date Taking? Authorizing Provider   lisinopril (PRINIVIL;ZESTRIL) 20 MG tablet Take 20 mg by mouth daily   Yes Historical Provider, MD   glipiZIDE (GLUCOTROL) 5 MG tablet Take 5 mg by mouth 2 times daily (before meals)   Yes Historical Provider, MD   metFORMIN (GLUCOPHAGE) 500 MG tablet Take 2 tablets by mouth 2 times daily (with meals)  Patient taking differently: Take 750 mg by mouth 3 times daily 3 times daily.  21  Yes SALTY Gabriel CNP   pravastatin (PRAVACHOL) 20 MG tablet Take 1 tablet by mouth daily 21  Yes SALTY Gabriel CNP   fluticasone [facility]) 50 MCG/ACT nasal spray 2 sprays by Each Nostril route daily 21  Yes NatashaSALTY Gastelum CNP       Current medications:    Current Facility-Administered Medications   Medication Dose Route Frequency Provider Last Rate Last Admin    vancomycin 1000 mg IVPB in 250 mL D5W addavial  1,000 mg IntraVENous Q12H Logan Hopkins MD        0.9 % sodium chloride infusion   IntraVENous Continuous Siva Arias MD        sodium chloride flush 0.9 % injection 5-40 mL  5-40 mL IntraVENous 2 times per day Siva Arias MD   10 mL at 22 2117    sodium chloride flush 0.9 % injection 5-40 mL  5-40 mL IntraVENous PRN Siva Arias MD        0.9 % sodium chloride infusion  25 mL IntraVENous PRN Siva Arias MD        clopidogrel (PLAVIX) tablet 75 mg  75 mg Oral Daily Bertha Navarro PA-C   75 mg at 22 1204    metFORMIN (GLUCOPHAGE) tablet 750 mg  750 mg Oral TID Janie Vásquez MD 750 mg at 01/03/22 1909    glipiZIDE (GLUCOTROL) tablet 10 mg  10 mg Oral BID AC Glenn Gruber MD   10 mg at 01/03/22 1909    lisinopril (PRINIVIL;ZESTRIL) tablet 10 mg  10 mg Oral Daily Glenn Gruber MD   10 mg at 01/03/22 1204    insulin lispro (HUMALOG) injection vial 0-6 Units  0-6 Units SubCUTAneous TID WC Glenn Gruber MD        insulin lispro (HUMALOG) injection vial 0-3 Units  0-3 Units SubCUTAneous Nightly Glenn Gruber MD        hydrALAZINE (APRESOLINE) injection 10 mg  10 mg IntraVENous Q6H PRN Glenn Gruber MD        aspirin EC tablet 81 mg  81 mg Oral Daily Glenn Gruber MD   81 mg at 01/03/22 1204    atorvastatin (LIPITOR) tablet 40 mg  40 mg Oral Nightly Glenn Gruber MD   40 mg at 01/03/22 2115    pantoprazole (PROTONIX) tablet 40 mg  40 mg Oral QAM AC Glenn Gruber MD   40 mg at 01/03/22 0649    enoxaparin (LOVENOX) injection 40 mg  40 mg SubCUTAneous Daily Glenn Gruber MD   40 mg at 01/03/22 2115       Allergies: Allergies   Allergen Reactions    Penicillins Hives and Swelling       Problem List:    Patient Active Problem List   Diagnosis Code    TIA (transient ischemic attack) G45.9    Acute CVA (cerebrovascular accident) ([facility] [address] I63.9    Cerebrovascular accident (CVA) due to stenosis of left carotid artery (HCC) I25.844    Stenosis of left carotid artery I65.22    Aphasia R47.01    Neurofibroma D36.10       Past Medical History:        Diagnosis Date    Hypertension     Type 2 diabetes mellitus ([facility] [address]        Past Surgical History:  History reviewed. No pertinent surgical history.     Social History:    Social History     Tobacco Use    Smoking status: Never Smoker    Smokeless tobacco: Never Used   Substance Use Topics    Alcohol use: No     Alcohol/week: 0.0 standard drinks                                Counseling given: Not Answered      Vital Signs (Current):   Vitals:    01/03/22 1430 01/03/22 1445 01/03/22 1500 01/04/22 1032   BP: 121/79 119/81 128/80 (!) 144/79 Pulse: 83 81 81 88   Resp: 15 11 12 20   Temp:    98 °F (36.7 °C)   TempSrc:    Temporal   SpO2: 94% 95% 95% 96%   Weight:    240 lb (108.9 kg)   Height:    5' 9\" (1.753 m)                                              BP Readings from Last 3 Encounters:   01/04/22 (!) 144/79   12/20/21 138/82   10/20/21 138/88       NPO Status:                                                                                 BMI:   Wt Readings from Last 3 Encounters:   01/04/22 240 lb (108.9 kg)   10/20/21 245 lb (111.1 kg)   08/18/21 234 lb (106.1 kg)     Body mass index is 35.44 kg/m².     CBC:   Lab Results   Component Value Date    WBC 8.1 01/04/2022    RBC 4.28 01/04/2022    HGB 12.9 01/04/2022    HCT 37.3 01/04/2022    MCV 87.3 01/04/2022    RDW 12.4 01/04/2022     01/04/2022       CMP:   Lab Results   Component Value Date     01/04/2022    K 4.2 01/04/2022     01/04/2022    CO2 25 01/04/2022    BUN 22 01/04/2022    CREATININE 0.66 01/04/2022    GFRAA >60.0 01/04/2022    LABGLOM >60.0 01/04/2022    GLUCOSE 147 01/04/2022    PROT 6.7 01/04/2022    CALCIUM 9.3 01/04/2022    BILITOT 0.4 01/04/2022    ALKPHOS 47 01/04/2022    AST 14 01/04/2022    ALT 19 01/04/2022       POC Tests:   Recent Labs     01/03/22 2048   POCGLU 170*       Coags:   Lab Results   Component Value Date    PROTIME 13.1 12/30/2021    INR 1.0 12/30/2021    APTT 33.8 12/30/2021       HCG (If Applicable): No results found for: PREGTESTUR, PREGSERUM, HCG, HCGQUANT     ABGs: No results found for: PHART, PO2ART, RBJ9CHX, BIY0ONM, BEART, S8ZXTNEM     Type & Screen (If Applicable):  No results found for: LABABO, LABRH    Drug/Infectious Status (If Applicable):  No results found for: HIV, HEPCAB    COVID-19 Screening (If Applicable):   Lab Results   Component Value Date    COVID19 Not Detected 12/31/2021    COVID19 DETECTED 08/02/2021           Anesthesia Evaluation  Patient summary reviewed and Nursing notes reviewed no history of anesthetic complications:   Airway: Mallampati: II  TM distance: >3 FB   Neck ROM: full  Mouth opening: > = 3 FB Dental: normal exam         Pulmonary:Negative Pulmonary ROS and normal exam                               Cardiovascular:  Exercise tolerance: good (>4 METS),   (+) hypertension:, hyperlipidemia      ECG reviewed               Beta Blocker:  Not on Beta Blocker      ROS comment: Left Ventricle  Left ventricular ejection fraction is visually estimated at 55%. Mild concentric left ventricular hypertrophy. Right Ventricle  Normal right ventricle structure and function. Normal right ventricle systolic pressure. Left Atrium  Normal left atrium. Right Atrium  Normal right atrium. Mitral Valve  Normal mitral valve structure and function. Tricuspid Valve  Normal tricuspid valve structure and function. Aortic Valve  Aortic valve leaflets are mildly thickened. No AS. Pulmonic Valve  Normal pulmonic valve structure and function. Pericardial Effusion  No evidence of pericardial effusion. Pleural Effusion  No evidence of pleural effusion. Aorta \ Miscellaneous  Miscellaneous normal findings were found. Normal sinus rhythm  Normal ECG  No previous ECGs available     Neuro/Psych:   (+) CVA:, TIA,             GI/Hepatic/Renal:   (+) morbid obesity         ROS comment: BMI 36.   Endo/Other:    (+) DiabetesType II DM, , .          Pt had PAT visit. Abdominal:             Vascular: negative vascular ROS. Other Findings:             Anesthesia Plan      MAC     ASA 3       Induction: intravenous. MIPS: Prophylactic antiemetics administered. Anesthetic plan and risks discussed with patient. Plan discussed with CRNA.     Attending anesthesiologist reviewed and agrees with Preprocedure content              Angely Kimble DO   1/4/2022

## 2022-01-04 NOTE — PROGRESS NOTES
Subjective:      Patient seen and examined for neurology follow up for acute/subacute left multifocal frontal lobe CVA in the distribution of the left MCA. Patient is alert and oriented x3, no acute stress, cooperative. Wife at bedside. Continues to have some expressive aphasia and mild right upper extremity incoordination. No other focal deficits appreciated. No headache, vision changes or dysphagia. Blood pressure stable today. Denies chest pain pressure palpitations. MEERA negative. Angiogram completed today with results pending. Review of Systems   Constitutional: Negative for appetite change, fatigue and fever. HENT: Negative for hearing loss and trouble swallowing. Eyes: Negative for photophobia and visual disturbance. Respiratory: Negative for choking, shortness of breath and wheezing. Cardiovascular: Negative for chest pain and palpitations. Gastrointestinal: Negative for nausea and vomiting. Musculoskeletal: Negative for back pain, gait problem, joint swelling, myalgias, neck pain and neck stiffness. Skin: Negative for color change. Neurological: Positive for speech difficulty. Negative for dizziness, tremors, seizures, syncope, facial asymmetry, weakness, light-headedness, numbness and headaches. Psychiatric/Behavioral: Negative for behavioral problems, confusion, hallucinations and sleep disturbance. As noted above, asymptomatic   Past Medical History:   Diagnosis Date    Hypertension     Type 2 diabetes mellitus ([facility] [address]      History reviewed. No pertinent surgical history.   Social History     Socioeconomic History    Marital status: Single     Spouse name: Not on file    Number of children: Not on file    Years of education: Not on file    Highest education level: Not on file   Occupational History    Not on file   Tobacco Use    Smoking status: Never Smoker    Smokeless tobacco: Never Used   Substance and Sexual Activity    Alcohol use: No     Alcohol/week: 0.0 standard drinks    Drug use: No    Sexual activity: Not on file   Other Topics Concern    Not on file   Social History Narrative    Not on file     Social Determinants of Health     Financial Resource Strain: Low Risk     Difficulty of Paying Living Expenses: Not hard at all   Food Insecurity: No Food Insecurity    Worried About Running Out of Food in the Last Year: Never true    Tai of Food in the Last Year: Never true   Transportation Needs: No Transportation Needs    Lack of Transportation (Medical): No    Lack of Transportation (Non-Medical):  No   Physical Activity:     Days of Exercise per Week: Not on file    Minutes of Exercise per Session: Not on file   Stress:     Feeling of Stress : Not on file   Social Connections:     Frequency of Communication with Friends and Family: Not on file    Frequency of Social Gatherings with Friends and Family: Not on file    Attends [employer] Services: Not on file    Active Member of Clubs or Organizations: Not on file    Attends Club or Organization Meetings: Not on file    Marital Status: Not on file   Intimate Partner Violence:     Fear of Current or Ex-Partner: Not on file    Emotionally Abused: Not on file    Physically Abused: Not on file    Sexually Abused: Not on file   Housing Stability:     Unable to Pay for Housing in the Last Year: Not on file    Number of [place] in the Last Year: Not on file    Unstable Housing in the Last Year: Not on file     Family History   Problem Relation Age of Onset    Rheum Arthritis Mother     Osteoarthritis Mother     Hypertension Mother     Cancer Mother         uterine cancer    Other Father         myocardial infarction    Diabetes Father      Allergies   Allergen Reactions    Penicillins Hives and Swelling     Current Facility-Administered Medications   Medication Dose Route Frequency Provider Last Rate Last Admin    sodium chloride flush 0.9 % injection 5-40 mL  5-40 mL IntraVENous 2 times per day Deya Miranda MD        sodium chloride flush 0.9 % injection 5-40 mL  5-40 mL IntraVENous PRN Deya Miranda MD        0.9 % sodium chloride infusion  25 mL IntraVENous PRN Deya Miranda MD        0.9 % sodium chloride infusion   IntraVENous Continuous Deya Miranda MD        [facility] injection 4 mg  4 mg IntraVENous Q6H PRN Deya Miranda MD        0.9 % sodium chloride infusion   IntraVENous Continuous Lisbeth Kirkpatrick MD        sodium chloride flush 0.9 % injection 5-40 mL  5-40 mL IntraVENous 2 times per day Lisbeth Kirkpatrick MD   10 mL at 01/03/22 2117    sodium chloride flush 0.9 % injection 5-40 mL  5-40 mL IntraVENous PRN Lisbeth Kirkpatrick MD        0.9 % sodium chloride infusion  25 mL IntraVENous PRN Lisbeth Kirkpatrick MD        clopidogrel (PLAVIX) tablet 75 mg  75 mg Oral Daily Bertha Navarro PA-C   75 mg at 01/03/22 1204    metFORMIN (GLUCOPHAGE) tablet 750 mg  750 mg Oral TID Wilbur Domingo MD   750 mg at 01/03/22 1909    glipiZIDE (GLUCOTROL) tablet 10 mg  10 mg Oral BID AC Wilbur Domingo MD   10 mg at 01/03/22 1909    lisinopril (PRINIVIL;ZESTRIL) tablet 10 mg  10 mg Oral Daily Wilbur Domingo MD   10 mg at 01/03/22 1204    insulin lispro (HUMALOG) injection vial 0-6 Units  0-6 Units SubCUTAneous TID  Wilbur Domingo MD        insulin lispro (HUMALOG) injection vial 0-3 Units  0-3 Units SubCUTAneous Nightly Wilbur Domingo MD        hydrALAZINE (APRESOLINE) injection 10 mg  10 mg IntraVENous Q6H PRN Wilbur Domingo MD        aspirin EC tablet 81 mg  81 mg Oral Daily Wilbur Domingo MD   81 mg at 01/03/22 1204    atorvastatin (LIPITOR) tablet 40 mg  40 mg Oral Nightly Wilbur Domingo MD   40 mg at 01/03/22 2115    pantoprazole (PROTONIX) tablet 40 mg  40 mg Oral QAM AC Wilbur Domingo MD   40 mg at 01/03/22 0649    enoxaparin (LOVENOX) injection 40 mg  40 mg SubCUTAneous Daily Wilbur Domingo MD   40 mg at 01/03/22 2114        Objective:   BP (!) 147/81   Pulse 79   Temp 98.8 °F (37.1 °C) (Temporal)   Resp 16   Ht 5' 9\" (1.753 m)   Wt 240 lb (108.9 kg)   SpO2 97%   BMI 35.44 kg/m²     Physical Exam  Vitals reviewed. Constitutional:       General: He is not in acute distress. Appearance: He is not ill-appearing or diaphoretic. HENT:      Head: Normocephalic and atraumatic. Eyes:      General: No visual field deficit. Extraocular Movements: Extraocular movements intact. Pupils: Pupils are equal, round, and reactive to light. Cardiovascular:      Rate and Rhythm: Normal rate and regular rhythm. Heart sounds: No murmur heard. Pulmonary:      Effort: Pulmonary effort is normal. No respiratory distress. Breath sounds: Normal breath sounds. Abdominal:      General: Bowel sounds are normal. There is no distension. Palpations: Abdomen is soft. Tenderness: There is no abdominal tenderness. Musculoskeletal:         General: Normal range of motion. Cervical back: Normal range of motion. Skin:     General: Skin is warm and dry. Neurological:      Mental Status: He is alert and oriented to person, place, and time. Cranial Nerves: No cranial nerve deficit, dysarthria or facial asymmetry. Sensory: No sensory deficit. Motor: No weakness, tremor, atrophy, abnormal muscle tone, seizure activity or pronator drift. Coordination: Coordination abnormal. Finger-Nose-Finger Test abnormal.      Deep Tendon Reflexes: Reflexes are normal and symmetric. Babinski sign absent on the right side. Babinski sign absent on the left side. Comments: Patient with some mild right upper extremity incoordination And expressive aphasia   Psychiatric:         Mood and Affect: Mood normal.       XR CHEST (2 VW)    Result Date: 12/31/2021  EXAMINATION: XR CHEST (2 VW) CLINICAL HISTORY: DYSARTHRIA COMPARISONS: None available. FINDINGS: Osseous structures are intact.  Cardiopericardial silhouette is normal. Pulmonary vasculature is normal. Lungs are clear. NO ACUTE CARDIOPULMONARY DISEASE. CT HEAD WO CONTRAST    Result Date: 12/30/2021  EXAMINATION: CT of the brain without contrast HISTORY: Stroke. Dizziness. Slurred speech. COMPARISON: None available TECHNIQUE: Multiple contiguous axial images were obtained of the brain from the skull base through the vertex. Multiplanar reformats were obtained. FINDINGS: Prominence of the sulci and ventricles compatible with mild generalized parenchymal volume loss. Areas of bilateral supratentorial white matter hypoattenuation are nonspecific but most likely related to chronic small vessel ischemic changes in a patient of this age. Gray-white matter differentiation is preserved. No acute hemorrhage or abnormal extra-axial fluid collection. No mass effect or midline shift. Mild paranasal sinus mucosal thickening. The mastoid air cells are clear. Calvarium is intact. Two right frontal scalp lesions measure approximately 10 mm and 15 mm respectively. The posterior left scalp lesion measures approximately 5 mm and a left frontal scalp lesion measures approximately 5 mm. Clinical correlation is recommended. No acute intracranial process. All CT scans at this facility use dose modulation, iterative reconstruction, and/or weight based dosing when appropriate to reduce radiation dose to as low as reasonably achievable. US CAROTID ARTERY BILATERAL    Result Date: 12/31/2021  EXAMINATION: US CAROTID ARTERY BILATERAL HISTORY:   TIA vs CVA . Slurred speech, right-sided weakness COMPARISON:None TECHNIQUE: Carotid duplex sonograms which include gray scale and color flow evaluation are complimented with spectral waveform analysis. Please refer to chart below for specific carotid velocity measurements. The degree of stenosis recorded on this exam uses the same method of stratification used in the NASCET trials.  This complies with ACR practice guidelines and the Society of radiology in ultrasound consensus statement and provides adequate information for clinical decision making. Society of Radiologists in Ultrasound (SRU) consensus statement was used to estimate internal carotid artery stenosis. RESULT: There is moderate to severe plaque in the left internal carotid artery, predominantly involving the proximal segment. There is mild plaque in right internal carotid artery. There is turbulent flow at the level of the proximal left internal carotid artery indicating significant stenosis, with high resistant waveforms seen in the mid to distal left internal carotid artery. There is antegrade flow identified in both vertebral arteries. ARTERIAL BLOOD FLOW VELOCITY RIGHT PEAK SYSTOLIC VELOCITIES (PSV)                                               Prox CCA    65 cm/s             Mid CCA     79 cm/s              Dist CCA    56 cm/s           Prox ICA    46 cm/s               Mid ICA     106 cm/s            Dist ICA    81 cm/s             Prox ECA    75 cm/s             Prox VERT   76 cm/s              ICA/CCA     1.3                        LEFT PEAK SYSTOLIC VELOCITIES (PSV)  Prox CCA    101 cm/s Mid CCA     70 cm/s Dist CCA    54 cm/s Prox ICA    31 cm/s Mid ICA     46 cm/s Dist ICA    48 cm/s Prox ECA    131 cm/s Prox VERT   44 cm/s ICA/CCA     0.7     HIGH-GRADE STENOSIS OF THE PROXIMAL LEFT INTERNAL CAROTID ARTERY.   <50  % STENOSIS IN THE RIGHT ICA ANTEGRADE FLOW IN THE BILATERAL VERTEBRAL ARTERIES       Lab Results   Component Value Date    WBC 8.1 01/04/2022    RBC 4.28 01/04/2022    HGB 12.9 01/04/2022    HCT 37.3 01/04/2022    MCV 87.3 01/04/2022    MCH 30.1 01/04/2022    MCHC 34.5 01/04/2022    RDW 12.4 01/04/2022     01/04/2022     Lab Results   Component Value Date     01/04/2022    K 4.2 01/04/2022     01/04/2022    CO2 25 01/04/2022    BUN 22 01/04/2022    CREATININE 0.66 01/04/2022    GFRAA >60.0 01/04/2022    LABGLOM >60.0 01/04/2022    GLUCOSE 147 01/04/2022    PROT 6.7 01/04/2022 LABALBU 3.8 01/04/2022    CALCIUM 9.3 01/04/2022    BILITOT 0.4 01/04/2022    ALKPHOS 47 01/04/2022    AST 14 01/04/2022    ALT 19 01/04/2022     Lab Results   Component Value Date    PROTIME 13.1 12/30/2021    INR 1.0 12/30/2021     Lab Results   Component Value Date    TSH 1.340 05/11/2021     Lab Results   Component Value Date    TRIG 170 12/30/2021    HDL 40 12/30/2021    LDLCALC 53 12/30/2021     No results found for: Valdez Becky, CANNAB, COCAINESCRN, LABMETH, OPIATESCREENURINE, PHENCYCLIDINESCREENURINE, PPXUR, ETOH  No results found for: LITHIUM, DILFRTOT, VALPROATE    Assessment:   Left cerebral stroke with subtle right-sided findings with word finding difficulty. Patient has multiple risk factors for cerebrovascular disease. 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 Patient was not on antiplatelet agent. I recommended that we start him on splint for now and Lipitor and complete his work-up with a MRI of the brain and carotids. 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  We will hold off the Plavix for now as he may require urgent intervention. MRI of the brain showed multifocal acute/subacute CVA in left MCA distribution. CTA of the neck showed diffuse stenosis of the left internal carotid artery. Approximately 60% stenosis of the proximal internal carotid artery. U/s of the carotid artery also showed high grade stenosis in the proximal left internal carotid artery. CTA of the head showed mild diffuse narrowing of the distal left internal carotid artery. ECHO with bubble study has not been completed. Will obtain hypercoagulable work up given patient's age. Will obtain work up for vasculitis given concern.    A1c 9  LDL 53  Will add Plavix for DAPT given the severity of stenosis. Will order MEERA with bubble study and cardiology consult for PFO given age. Will consult Dr. Felisa Clemons regarding left internal carotid stenosis. Likely not a candidate for endarterectomy given location in internal carotid artery, but Dr. Elmira Wade would like to discuss with him. Remains nonfocal his examination and ambulatory. Patient has left carotid artery stenosis intra and extracranial though this cannot be certain without a cerebral angiogram.  We have discussed this findings with Dr. Felisa Clemons and this will be performed on Tuesday in the interim we will arrange for a MEERA    Patient to undergo angiogram to further assess for left carotid stenosis tomorrow. MEERA with bubble study pending  CRP less than 3  Sed rate 9  MPO pending  Hypercoagulable work-up pending  Blood pressure control improved  Continue dual antiplatelet therapy And high intensity statin    MEERA negative  Angiogram results pending  Continue dual antiplatelet therapy    I have personally performed a face to face diagnostic evaluation on this patient, reviewed all data and investigations, and am the sole provider of all clinical decisions on the neurological status of this patient. asymptomatic,  angiogram done, and discussed with Dr Felisa Clemons  Intracarnial stenosis, not surgical,  aggressive dual antiplatelet therapy, cholesterol lowering agent  Ok d/c form neuro      Jose C GAURI Wade MD, [place], American Board of Psychiatry & Neurology  Board Certified in Vascular Neurology  Board Certified in Neuromuscular Medicine  Certified in [facility]:

## 2022-01-04 NOTE — PROGRESS NOTES
Physical Therapy   [facility] [id number]    NAME: Parish Rubi    : 1970 (45 y.o.)  MRN: [id number]    Account: [de-identified]  Gender: male    PT evaluation and treatment orders received. Chart reviewed. PT eval attempted. Patient Unavailable: Pt scheduled for carotid DSA this AM    Will attempt PT evaluation again at earliest convenience.       Electronically signed by Ben Contreras PT on 22 at 10:29 AM EST

## 2022-01-04 NOTE — ANESTHESIA POSTPROCEDURE EVALUATION
Department of Anesthesiology  Postprocedure Note    Patient: Dennis Crawford  MRN: [id number]  YOB: 1970  Date of evaluation: 1/4/2022  Time:  11:55 AM     Procedure Summary     Date: 01/04/22 Room / Location: [address] [place] [address]    Anesthesia Start: 1100 Anesthesia Stop:     Procedure: RIGHT CAROTID DIGITAL SUBTRACTION ARTERIOGRAM VIA RIGHT FEMORAL ARTERY (Right ) Diagnosis: (CAROTID STENOSIS)    Surgeons: Yan Mendoza MD Responsible Provider: Efren Egan DO    Anesthesia Type: MAC ASA Status: 3          Anesthesia Type: General    Adan Phase I: Adan Score: 10    Adan Phase II:      Last vitals: Reviewed and per EMR flowsheets.        Anesthesia Post Evaluation    Patient location during evaluation: bedside  Patient participation: complete - patient participated  Level of consciousness: awake and awake and alert  Airway patency: patent  Nausea & Vomiting: no nausea and no vomiting  Complications: no  Cardiovascular status: blood pressure returned to baseline and hemodynamically stable  Respiratory status: acceptable  Hydration status: euvolemic

## 2022-01-05 VITALS
DIASTOLIC BLOOD PRESSURE: 91 MMHG | BODY MASS INDEX: 35.55 KG/M2 | HEART RATE: 96 BPM | WEIGHT: 240 LBS | SYSTOLIC BLOOD PRESSURE: 163 MMHG | HEIGHT: 69 IN | TEMPERATURE: 98.2 F | OXYGEN SATURATION: 99 % | RESPIRATION RATE: 18 BRPM

## 2022-01-05 LAB
ALBUMIN SERPL-MCNC: 3.8 G/DL (ref 3.5–4.6)
ALP BLD-CCNC: 44 U/L (ref 35–104)
ALT SERPL-CCNC: 16 U/L (ref 0–41)
ANION GAP SERPL CALCULATED.3IONS-SCNC: 11 MEQ/L (ref 9–15)
ANTICARDIOLIPIN IGA ANTIBODY: <10 APL
ANTICARDIOLIPIN IGG ANTIBODY: <10 GPL
AST SERPL-CCNC: 12 U/L (ref 0–40)
BASOPHILS ABSOLUTE: 0 K/UL (ref 0–0.2)
BASOPHILS RELATIVE PERCENT: 0.4 %
BILIRUB SERPL-MCNC: 0.4 MG/DL (ref 0.2–0.7)
BUN BLDV-MCNC: 17 MG/DL (ref 6–20)
CALCIUM SERPL-MCNC: 9.2 MG/DL (ref 8.5–9.9)
CARDIOLIPIN AB IGM: <10 MPL
CHLORIDE BLD-SCNC: 102 MEQ/L (ref 95–107)
CO2: 25 MEQ/L (ref 20–31)
CREAT SERPL-MCNC: 0.55 MG/DL (ref 0.7–1.2)
EOSINOPHILS ABSOLUTE: 0.3 K/UL (ref 0–0.7)
EOSINOPHILS RELATIVE PERCENT: 3 %
GFR AFRICAN AMERICAN: >60
GFR NON-AFRICAN AMERICAN: >60
GLOBULIN: 2.8 G/DL (ref 2.3–3.5)
GLUCOSE BLD-MCNC: 126 MG/DL (ref 70–99)
GLUCOSE BLD-MCNC: 158 MG/DL (ref 60–115)
GLUCOSE BLD-MCNC: 177 MG/DL (ref 60–115)
HCT VFR BLD CALC: 38 % (ref 42–52)
HEMOGLOBIN: 13 G/DL (ref 14–18)
LYMPHOCYTES ABSOLUTE: 2.2 K/UL (ref 1–4.8)
LYMPHOCYTES RELATIVE PERCENT: 23.4 %
MCH RBC QN AUTO: 30.1 PG (ref 27–31.3)
MCHC RBC AUTO-ENTMCNC: 34.3 % (ref 33–37)
MCV RBC AUTO: 87.6 FL (ref 80–100)
MONOCYTES ABSOLUTE: 0.9 K/UL (ref 0.2–0.8)
MONOCYTES RELATIVE PERCENT: 9.8 %
MYELOPEROXIDASE AB: 0 AU/ML (ref 0–19)
NEUTROPHILS ABSOLUTE: 6.1 K/UL (ref 1.4–6.5)
NEUTROPHILS RELATIVE PERCENT: 63.4 %
PDW BLD-RTO: 12.3 % (ref 11.5–14.5)
PERFORMED ON: ABNORMAL
PERFORMED ON: ABNORMAL
PLATELET # BLD: 196 K/UL (ref 130–400)
POTASSIUM SERPL-SCNC: 4.4 MEQ/L (ref 3.4–4.9)
RBC # BLD: 4.34 M/UL (ref 4.7–6.1)
SERINE PROTEASE 3 AB: 0 AU/ML (ref 0–19)
SODIUM BLD-SCNC: 138 MEQ/L (ref 135–144)
TOTAL PROTEIN: 6.6 G/DL (ref 6.3–8)
WBC # BLD: 9.6 K/UL (ref 4.8–10.8)

## 2022-01-05 PROCEDURE — 2580000003 HC RX 258: Performed by: THORACIC SURGERY (CARDIOTHORACIC VASCULAR SURGERY)

## 2022-01-05 PROCEDURE — APPSS30 APP SPLIT SHARED TIME 16-30 MINUTES: Performed by: NURSE PRACTITIONER

## 2022-01-05 PROCEDURE — 99233 SBSQ HOSP IP/OBS HIGH 50: CPT | Performed by: PSYCHIATRY & NEUROLOGY

## 2022-01-05 PROCEDURE — 85025 COMPLETE CBC W/AUTO DIFF WBC: CPT

## 2022-01-05 PROCEDURE — 36415 COLL VENOUS BLD VENIPUNCTURE: CPT

## 2022-01-05 PROCEDURE — 97166 OT EVAL MOD COMPLEX 45 MIN: CPT

## 2022-01-05 PROCEDURE — 2700000000 HC OXYGEN THERAPY PER DAY

## 2022-01-05 PROCEDURE — 6370000000 HC RX 637 (ALT 250 FOR IP): Performed by: STUDENT IN AN ORGANIZED HEALTH CARE EDUCATION/TRAINING PROGRAM

## 2022-01-05 PROCEDURE — 6360000002 HC RX W HCPCS: Performed by: INTERNAL MEDICINE

## 2022-01-05 PROCEDURE — 97116 GAIT TRAINING THERAPY: CPT

## 2022-01-05 PROCEDURE — 6370000000 HC RX 637 (ALT 250 FOR IP): Performed by: INTERNAL MEDICINE

## 2022-01-05 PROCEDURE — 97163 PT EVAL HIGH COMPLEX 45 MIN: CPT

## 2022-01-05 PROCEDURE — 80053 COMPREHEN METABOLIC PANEL: CPT

## 2022-01-05 RX ORDER — LISINOPRIL 20 MG/1
20 TABLET ORAL DAILY
Status: DISCONTINUED | OUTPATIENT
Start: 2022-01-06 | End: 2022-01-05 | Stop reason: HOSPADM

## 2022-01-05 RX ORDER — GLIPIZIDE 10 MG/1
10 TABLET ORAL
Qty: 60 TABLET | Refills: 3 | Status: SHIPPED | OUTPATIENT
Start: 2022-01-05 | End: 2022-05-02 | Stop reason: SDUPTHER

## 2022-01-05 RX ORDER — ASPIRIN 81 MG/1
81 TABLET ORAL DAILY
Qty: 30 TABLET | Refills: 3 | Status: SHIPPED | OUTPATIENT
Start: 2022-01-05 | End: 2022-05-02 | Stop reason: SDUPTHER

## 2022-01-05 RX ORDER — CLOPIDOGREL BISULFATE 75 MG/1
75 TABLET ORAL DAILY
Qty: 30 TABLET | Refills: 3 | Status: SHIPPED | OUTPATIENT
Start: 2022-01-05 | End: 2022-05-02 | Stop reason: SDUPTHER

## 2022-01-05 RX ORDER — ATORVASTATIN CALCIUM 40 MG/1
40 TABLET, FILM COATED ORAL NIGHTLY
Qty: 30 TABLET | Refills: 3 | Status: SHIPPED | OUTPATIENT
Start: 2022-01-05 | End: 2022-05-02 | Stop reason: SDUPTHER

## 2022-01-05 RX ADMIN — ENOXAPARIN SODIUM 40 MG: 100 INJECTION SUBCUTANEOUS at 08:59

## 2022-01-05 RX ADMIN — CLOPIDOGREL BISULFATE 75 MG: 75 TABLET ORAL at 08:59

## 2022-01-05 RX ADMIN — LISINOPRIL 10 MG: 10 TABLET ORAL at 09:06

## 2022-01-05 RX ADMIN — SODIUM CHLORIDE, PRESERVATIVE FREE 10 ML: 5 INJECTION INTRAVENOUS at 08:59

## 2022-01-05 RX ADMIN — GLIPIZIDE 10 MG: 10 TABLET ORAL at 06:35

## 2022-01-05 RX ADMIN — ASPIRIN 81 MG: 81 TABLET, COATED ORAL at 08:55

## 2022-01-05 RX ADMIN — METFORMIN HYDROCHLORIDE 750 MG: 500 TABLET ORAL at 08:55

## 2022-01-05 RX ADMIN — PANTOPRAZOLE SODIUM 40 MG: 40 TABLET, DELAYED RELEASE ORAL at 06:35

## 2022-01-05 ASSESSMENT — ENCOUNTER SYMPTOMS
PHOTOPHOBIA: 0
CHOKING: 0
TROUBLE SWALLOWING: 0
NAUSEA: 0
SHORTNESS OF BREATH: 0
BACK PAIN: 0
VOMITING: 0
WHEEZING: 0
COLOR CHANGE: 0

## 2022-01-05 ASSESSMENT — PAIN SCALES - GENERAL: PAINLEVEL_OUTOF10: 0

## 2022-01-05 NOTE — PROGRESS NOTES
Physical Therapy Med Surg Initial Assessment  Facility/Department: [facility]  Room: P456/Z983-82     NAME: Yan Mclain  : 1970 (45 y.o.)  MRN: [id number]  CODE STATUS: Full Code    Date of Service: 2022    Patient Diagnosis(es): TIA (transient ischemic attack) [G45.9]  Acute CVA (cerebrovascular accident) [facility]) [I63.9]   Chief Complaint   Patient presents with    Extremity Weakness     pt c/o RUE/RLE weakness and slurred speech that started approx 1500 today     Patient Active Problem List    Diagnosis Date Noted    Cerebrovascular accident (CVA) due to stenosis of left carotid artery [facility]     Stenosis of left carotid artery     Aphasia     Neurofibroma     Acute CVA (cerebrovascular accident) ([facility] 2021    TIA (transient ischemic attack) 2021      Past Medical History:   Diagnosis Date    Hypertension     Type 2 diabetes mellitus ([facility] [facility]      Past Surgical History:   Procedure Laterality Date    VASCULAR SURGERY Right 2022    RIGHT CAROTID DIGITAL SUBTRACTION ARTERIOGRAM VIA RIGHT FEMORAL ARTERY performed by Torri Whitten [place] [facility]     Chart Reviewed: Yes  Patient assessed for rehabilitation services?: Yes  Family / Caregiver Present: No  General Comment  Comments: Pt resting in bed, agreeable to PT    Restrictions:  Restrictions/Precautions: Fall Risk     SUBJECTIVE: Subjective: Pt denies pain, headache, dizziness, nausea/vomitting. Pt states \"I think I am going home today. \"    Pain  Pre Treatment Pain Screening  Pain at present: 0  Scale Used: Numeric Score    Post Treatment Pain Screening:   Pain Screening  Patient Currently in Pain: Yes  Pain Assessment  Pain Assessment: 0-10  Pain Level: 0    Prior Level of Function:  Social/Functional History  Lives With: Significant other  Type of Home: House  Home Layout: Two level,Multi-level,Bed/Bath upstairs,1/2 bath on main level (12 steps with handrail)  Home Access: Level entry  Bathroom Shower/Tub: Tub/Shower unit  [employer]:  (none)  Home Equipment:  (none)  ADL Assistance: Independent  Homemaking Assistance: Independent  Homemaking Responsibilities: Yes  Ambulation Assistance: Independent  Transfer Assistance: Independent  Active : Yes  Occupation: Full time employment  Type of occupation: Drive tow truck    OBJECTIVE:   Vision: Impaired  Vision Exceptions: Wears glasses at all times  Hearing: Within functional limits    Cognition:  Overall Orientation Status: Within Normal Limits  Follows Commands: Within Functional Limits    Observation/Palpation  Posture: Fair (forward head, rounded shoulders, flexed posture)  Observation: pleasant, cooperative, no acute distress    ROM:  RLE AROM: WFL  LLE AROM : WFL    Strength:  Strength RLE  Comment: hip 3+/5, knee4/5, ankle 4/5  Strength LLE  Comment: grossly 4+/5    Neuro:  Balance  Sitting - Static: Good  Sitting - Dynamic: Good  Standing - Static: Fair;+ (SBA with no UE support)  Standing - Dynamic:  (CGA with 1 UE support)     Motor Control  Gross Motor?: Exceptions  Comments: motor ataxia, R LE weakness, delayed balance reactions  Coordination  Heel to Shin: Ataxic  Sensation  Overall Sensation Status: WFL    Bed mobility  Supine to Sit: Stand by assistance  Sit to Supine: Stand by assistance    Transfers  Sit to Stand: Contact guard assistance  Stand to sit: Contact guard assistance  Comment: trialed with and without AD. Pt ed in safe hand placement during sit<>Stands    Ambulation  Ambulation?: Yes  Ambulation 1  Surface: level tile  Device: No Device;Rolling Walker  Assistance: Minimal assistance  Quality of Gait: ataxic, occasional scissoring gait, R LE decreased foot clearance, step length, decreased stance time on R, R hip and knee flexed in stance phase  Gait Deviations: Slow Alena; Increased TRAV; Decreased step height  Distance: 25, 25, 50 with no AD; 50ft x 2 with 2ww  Comments: Min A with no UE support with 1 UE support [facility]. VC to normalize gait: upright posture, improve R LE clearance, step length, widen TRAV. Gait training performed: step stance, stepping with 2 UE support R and L, to improve step length, foot clearance and heel strike. Activity Tolerance  Activity Tolerance: Patient Tolerated treatment well      PT Education  PT Education: PT Role;Goals;Plan of Care;Transfer Training  Patient Education: recommendation for rehab d/t significant gait deficits-pt adamently declining. Pt ed in recommendation of 24 hr care with physical assistance for ambution from caregiver d/t high fall risk, ed in recommendation for 2ww at all times and PT follow up to address his deficits. Pt ed neurplasticity and benefits of early and intensive PT interventions    ASSESSMENT:   Body structures, Functions, Activity limitations: Decreased functional mobility ; Decreased safe awareness;Decreased balance;Decreased coordination;Decreased strength;Decreased posture  Decision Making: High Complexity  History: high  Exam: high  Clinical Presentation: high    Specific instructions for Next Treatment: gait training, NMR  Prognosis: Good  Patient Education: recommendation for rehab d/t significant gait deficits-pt adamently declining. Pt ed in recommendation of 24 hr care with physical assistance for ambution from caregiver d/t high fall risk, ed in recommendation for 2ww at all times and PT follow up to address his deficits. Pt ed neurplasticity and benefits of early and intensive PT interventions  Barriers to Learning: insight into severity of deficits    DISCHARGE RECOMMENDATIONS:  Discharge Recommendations: Continue to assess pending progress,Patient would benefit from continued therapy after discharge  Assessment: Pt demonstrates the above deficits and decline in functional mobility status placing them at increased risk for falls. Pt demonstrates significant gait deviations and demonstrates high fall risk.  Pt requires 2ww and Min A to safely assistance= pt performs 50% of the activity; assistance is required to complete the activity  Maximal assistance = pt performs 25% of the activity; assistance is required to complete the activity  Dependent = pt requires total physical assistance to accomplish the task

## 2022-01-05 NOTE — PROGRESS NOTES
CLINICAL PHARMACY NOTE: MEDS TO BEDS    Total # of Prescriptions Filled: 4   The following medications were delivered to the patient:  · Aspirin 81mg tab  · Glipizide 10mg tab  · Clopidogrel 75mg tab  · Atorvastatin 40mg tab    Additional Documentation:

## 2022-01-05 NOTE — DISCHARGE INSTR - DIET
Good nutrition is important when healing from an illness, injury, or surgery. Follow any nutrition recommendations given to you during your hospital stay. If you were given an oral nutrition supplement while in the hospital, continue to take this supplement at home. You can take it with meals, in-between meals, and/or before bedtime. These supplements can be purchased at most local grocery stores, pharmacies, and chain [employer])-stores. If you have any questions about your diet or nutrition, call the hospital and ask for the dietitian.   Diabetic diet

## 2022-01-05 NOTE — OP NOTE
[address]                      [address], [address] [facility]                                OPERATIVE REPORT    PATIENT NAME: Alison Johnson            :        1970  MED REC NO:   [id number]                            ROOM:       L323  ACCOUNT NO:   [de-identified]                           ADMIT DATE: 2021  PROVIDER:     Modesto Dorado MD    DATE OF PROCEDURE:  2022    ADDENDUM    The sponge, instruments, needle counts were correct x2. The blood loss  was 0 mL. The contrast load was 30 mL and the fluoroscopy time was 3  minutes and 3 seconds.         Nettie Rogers MD    D: 2022 16:46:03       T: 2022 16:48:30     [email or web address]  Job#: [id number]     Doc#: [id number]    CC:

## 2022-01-05 NOTE — DISCHARGE SUMMARY
Discharge Summary    Date: 1/5/2022  Patient Name: Tracy Baez YOB: 1970 Age: 45 y.o. Admit Date: 12/30/2021  Discharge Date: 1/5/2022  Discharge Condition: [address]    Admission Diagnosis  TIA (transient ischemic attack) (G45.9); Acute CVA (cerebrovascular accident) ([facility] [address] (I63.9)     Discharge Diagnosis  Active Problems: TIA (transient ischemic attack) Acute CVA (cerebrovascular accident) ([facility] [address] Cerebrovascular accident (CVA) due to stenosis of left carotid artery ([facility] [address] Stenosis of left carotid artery Aphasia NeurofibromaResolved Problems: * No resolved hospital problems. [facility] Stay  Narrative of Hospital Course:  Patient comes with slurring speech and weakness found to have stroke, MEERA was negative patient found to have carotid vascular disease status post angio no stent placement. Patient was started on Plavix and aspirin. Patient is to follow-up with neurology as outpatient for hypercoagulable work-up. Patient hemoglobin A1c is 9. And is a . Counseling was given about if not controlled possible needing subcu insulin. Patient did not want to go to acute rehab wheeled walker was ordered. Patient home health care was also ordered. Consultants:  IP CONSULT TO NEUROLOGYIP CONSULT TO CARDIOLOGYIP CONSULT TO VASCULAR SURGERYIP CONSULT TO HOME CARE NEEDS    Surgeries/procedures Performed:       Treatments:            Discharge Plan/Disposition:  Home    Hospital/Incidental Findings Requiring Follow Up:    Patient Instructions:    Diet:    Activity:  For number of days (if applicable): Other Instructions:    Provider Follow-Up:   No follow-ups on file. Significant Diagnostic Studies:    Recent Labs:  Admission on 12/30/2021No results displayed because visit has over 200 results. ------------    Radiology last 7 days:  CTA HEAD W WO CONTRASTResult Date: 12/31/2021CTA Head 1. Mild diffuse narrowing of the distal left internal carotid artery. CTA Neck 1.  There are noncalcified atherosclerotic plaques resulting in diffuse narrowing of the left internal carotid artery extending to the cranially, with approximately 60% stenosis at the proximal segment. However, inflammation/vasculitis may result in similar findings. XR CHEST (2 VW)Result Date: 12/31/2021NO ACUTE CARDIOPULMONARY DISEASE. CT HEAD WO CONTRASTResult Date: 12/30/2021No acute intracranial process. All CT scans at this facility use dose modulation, iterative reconstruction, and/or weight based dosing when appropriate to reduce radiation dose to as low as reasonably achievable. CTA NECK W WO CONTRASTResult Date: 12/31/2021CTA Head 1. Mild diffuse narrowing of the distal left internal carotid artery. CTA Neck 1. There are noncalcified atherosclerotic plaques resulting in diffuse narrowing of the left internal carotid artery extending to the cranially, with approximately 60% stenosis at the proximal segment. However, inflammation/vasculitis may result in similar findings. MRI BRAIN WO CONTRASTResult Date: 12/31/2021Multiple foci of left frontal lobe restricted diffusion compatible with acute/subacute Left MCA distribution infarct. No hemorrhage. COMMUNICATION:  Communicated with: Abdullahi Quinn , ER nurse on 12/31/2021 at 12:31 PM 12:31 PM.   US CAROTID ARTERY BILATERALResult Date: 12/31/2021HIGH-GRADE STENOSIS OF THE PROXIMAL LEFT INTERNAL CAROTID ARTERY.   <50  % STENOSIS IN THE RIGHT ICA ANTEGRADE FLOW IN THE BILATERAL VERTEBRAL ARTERIES      Pending Labs   Order Current Status  Antithrombin III In process  CARDIOLIPIN AB IGG, IGM, IGA In process  Lupus Anticoagulant In process  MPO/KY-3(ANCA) ABS In process  Protein C Antigen, Total In process  Protein S Antigen, Total In process      Discharge Medications    Current Discharge Medication ListSTART taking these medicationsaspirin 81 MG EC tabletTake 1 tablet by mouth dailyQty: 30 tablet Refills: 3clopidogrel (PLAVIX) 75 MG tabletTake 1 tablet by mouth dailyQty: 30 tablet Refills: 3atorvastatin (LIPITOR) 40 MG tabletTake 1 tablet by mouth nightlyQty: 30 tablet Refills: 3    Current Discharge Medication ListCONTINUE these medications which have CHANGEDglipiZIDE (GLUCOTROL) 10 MG tabletTake 1 tablet by mouth 2 times daily (before meals)Qty: 60 tablet Refills: 3    Current Discharge Medication ListCONTINUE these medications which have NOT CHANGEDlisinopril (PRINIVIL;ZESTRIL) 20 MG tabletTake 20 mg by mouth dailymetFORMIN (GLUCOPHAGE) 500 MG tabletTake 2 tablets by mouth 2 times daily (with meals)Qty: 360 tablet Refills: 1Associated Diagnoses:Type 2 diabetes mellitus with diabetic polyneuropathy, without long-term current use of insulin (HCC)fluticasone (FLONASE) 50 MCG/ACT nasal spray2 sprays by Each Nostril route dailyQty: 1 Bottle Refills: 0    Current Discharge Medication ListSTOP taking these medicationspravastatin (PRAVACHOL) 20 MG tabletComments:Reason for Stopping:Dextromethorphan-guaiFENesin (CORICIDIN HBP CONGESTION/COUGH)  MG CAPSComments:Reason for Stopping:    Time Spent on Discharge:E] minutes were spent in patient examination, evaluation, counseling as well as medication reconciliation, prescriptions for required medications, discharge plan, and follow up.     Electronically signed by Haydee Tsai MD on 1/5/22 at 12:41 PM EST   overtime on dc summary was 45 min

## 2022-01-05 NOTE — PROGRESS NOTES
Patient working with therapy, right side noted weaker when ambulating, alert and oriented x 4, denies any pain, right groin dressing removed, scant amount of dried drainage noted on the old dressing, band aid applied due to site appears still draining, instructed the patient to remove and put a new bandage on if he see that it is still draining

## 2022-01-05 NOTE — OP NOTE
[address]                      [address], [address] [facility]                                OPERATIVE REPORT    PATIENT NAME: Ethan Mccoy            :        1970  MED REC NO:   [id number]                            ROOM:       S124  ACCOUNT NO:   [de-identified]                           ADMIT DATE: 2021  PROVIDER:     Willie Connor MD    DATE OF PROCEDURE:  2022    PROCEDURE:  Right femoral selective bilateral carotid DSA, both cervical  and intracerebral.    SURGEON:  Willie Connor MD    ANESTHESIA:  LMA. FINDINGS:  A 58-year-old gentleman admitted to our hospital on   with a CVA. He was treated with TPA, has improved clinically and the  discrepancy existed in the documentation of studies regarding the  cervical and intracerebral carotid arteries. Dr. Vick Delarosa his neurologist,  wanted to know for sure if there was indeed stenotic disease in the  cervical carotid artery, especially on the left side and whether or not  the intracerebral carotid runoff was appropriate for intervention. In  order to establish answers to these questions, the patient was advised  to undergo this procedure for which he consented and the procedure was  done without complication in the method described below. OPERATIVE PROCEDURE:  We utilized a total of 30 mL of contrast.  The  fluoroscopy time was 3 minutes and 3 seconds. The right femoral artery  entry site was hemostased with a 6-Namibian Angio-Seal device. With the patient properly prepared and identified, we accessed the right  femoral artery with a percutaneous retrograde puncture with a  microcatheter system. We advanced from that point up a 0.035-inch  Glidewire into the external right iliac artery, the common iliac artery,  the abdominal aorta, and up into the descending thoracic aorta and up  into the thoracic aortic arch.   Through a 5-Namibian sheath, we utilized a  5-Namibian  catheter and selectively cannulated the right common  carotid artery and internal carotid artery. From that point, we  visualized the entirety of the cervical right carotid artery and the  intracerebral artery. We withdrew our wire into the thoracic arch and  then used a 5-Finnish SIM2 wire and selectively cannulated into the left  common carotid and internal carotid artery and delineated images of the  left cervical carotid artery and intracranial circulations. Once this  was completed to our satisfaction, the sheath and catheter were removed  from the right femoral artery and we hemostased the entry site with a  6-Finnish Angio-Seal device. FINDINGS:  1. Normal-appearing bilateral common right and left carotid arteries. 2.  On the right side, there was no stenosis of the origin of the right  internal carotid artery. 3.  Flow in the distal right internal carotid artery showed no stenosis. 4.  The siphon portion of the right carotid artery showed no stenosis  nor did any of the intracranial circulation of the right internal  carotid artery demonstrate that. 5.  _____ normal appearing common left carotid artery, there was  superimposition of the right internal and external carotid arteries,  which was eliminated by angulation of the lateral oblique views. 6.  The origin of the left internal carotid artery was diminutive, but  patent. 7.  Continuation of flow in a diminutive appearing left internal carotid  artery. 8.  Flow into the siphon portion of the left internal carotid artery  seen without any impairment. 9.  Flow into the intracerebral portion of the left internal carotid  artery showing lack of collateralization as compared to the right side  unless there was no significant runoff of flow in the area of the middle  cerebral artery after the uppermost portion of contrast reached the  bifurcation.         Kvng Wilson MD    D: 01/04/2022 94:45:01       T: 01/04/2022 16:31:27 [email or web address]  Job#: [id number]     Doc#: [id number]    CC:

## 2022-01-05 NOTE — PROGRESS NOTES
I informed the patient that no surgery is indicated for his continued care. Medical management and physical therapy are the 2 main points of his care. Disposition per Dr Sb Joseph.

## 2022-01-05 NOTE — CARE COORDINATION
MET WITH PATIENT. PATIENT ALERT AND ORIENTED AND ABLE TO MAKE HIS OWN DECISIONS. THERAPY EVALS HAVE RECOMMENDED IN-PATIENT REHAB. PATIENT DENIES NEEDS FOR INPATIENT REHAB. VERBALIZES THAT HIS FIANCEE WILL BE ABLE TO BE WITH HIM 24/7 AND IS ABLE TO TAKE CARE OF HIM AND TAKE HIM TO OUTPATIENT THERAPY. PATIENT SAID HE WILL LET HIS FIANCEE KNOW ABOUT HIS D/C NEEDS AND RECOMMENDATIONS. LET PATIENT KNOW THAT IF HE CHANGES HIS MIND TO LET STAFF KNOW. VERBALIZES UNDERSTANDING. NOTIFIED STAFF.

## 2022-01-05 NOTE — PROGRESS NOTES
Subjective:      Patient seen and examined for neurology follow up for acute/subacute left multifocal frontal lobe CVA in the distribution of the left MCA. Patient is alert and oriented x3, no acute stress, cooperative. Wife at bedside. Continues to have some expressive aphasia and mild right upper extremity incoordination. No other focal deficits appreciated. No headache, vision changes or dysphagia. Blood pressure stable today. Denies chest pain pressure palpitations. MEERA negative. Review of Systems   Constitutional: Negative for appetite change, fatigue and fever. HENT: Negative for hearing loss and trouble swallowing. Eyes: Negative for photophobia and visual disturbance. Respiratory: Negative for choking, shortness of breath and wheezing. Cardiovascular: Negative for chest pain and palpitations. Gastrointestinal: Negative for nausea and vomiting. Musculoskeletal: Negative for back pain, gait problem, joint swelling, myalgias, neck pain and neck stiffness. Skin: Negative for color change. Neurological: Positive for speech difficulty. Negative for dizziness, tremors, seizures, syncope, facial asymmetry, weakness, light-headedness, numbness and headaches. Psychiatric/Behavioral: Negative for behavioral problems, confusion, hallucinations and sleep disturbance.    As noted above, asymptomatic     Walked well  Past Medical History:   Diagnosis Date    Hypertension     Type 2 diabetes mellitus ([facility] [address]      Past Surgical History:   Procedure Laterality Date    VASCULAR SURGERY Right 1/4/2022    RIGHT CAROTID DIGITAL SUBTRACTION ARTERIOGRAM VIA RIGHT FEMORAL ARTERY performed by Torri Whitten MD at [address] [place] [address] History     Socioeconomic History    Marital status: Single     Spouse name: Not on file    Number of children: Not on file    Years of education: Not on file    Highest education level: Not on file   Occupational History    Not on file   Tobacco Use    Smoking status: Never Smoker    Smokeless tobacco: Never Used   Substance and Sexual Activity    Alcohol use: No     Alcohol/week: 0.0 standard drinks    Drug use: No    Sexual activity: Not on file   Other Topics Concern    Not on file   Social History Narrative    Not on file     Social Determinants of Health     Financial Resource Strain: Low Risk     Difficulty of Paying Living Expenses: Not hard at all   Food Insecurity: No Food Insecurity    Worried About Running Out of Food in the Last Year: Never true    [address] in the Last Year: Never true   Transportation Needs: No Transportation Needs    Lack of Transportation (Medical): No    Lack of Transportation (Non-Medical):  No   Physical Activity:     Days of Exercise per Week: Not on file    Minutes of Exercise per Session: Not on file   Stress:     Feeling of Stress : Not on file   Social Connections:     Frequency of Communication with Friends and Family: Not on file    Frequency of Social Gatherings with Friends and Family: Not on file    Attends Muslim Services: Not on file    Active Member of Clubs or Organizations: Not on file    Attends Club or Organization Meetings: Not on file    Marital Status: Not on file   Intimate Partner Violence:     Fear of Current or Ex-Partner: Not on file    Emotionally Abused: Not on file    Physically Abused: Not on file    Sexually Abused: Not on file   Housing Stability:     Unable to Pay for Housing in the Last Year: Not on file    Number of [place] in the Last Year: Not on file    Unstable Housing in the Last Year: Not on file     Family History   Problem Relation Age of Onset    Rheum Arthritis Mother     Osteoarthritis Mother     Hypertension Mother     Cancer Mother         uterine cancer    Other Father         myocardial infarction    Diabetes Father      Allergies   Allergen Reactions    Penicillins Hives and Swelling     Current Facility-Administered Medications   Medication Dose Route Frequency Provider Last Rate Last Admin    [START ON 1/6/2022] lisinopril (PRINIVIL;ZESTRIL) tablet 20 mg  20 mg Oral Daily Wilbur Domingo MD        sodium chloride flush 0.9 % injection 5-40 mL  5-40 mL IntraVENous 2 times per day Deya Miranda MD   10 mL at 01/05/22 0859    sodium chloride flush 0.9 % injection 5-40 mL  5-40 mL IntraVENous PRN Deya Miranda MD        0.9 % sodium chloride infusion  25 mL IntraVENous PRN Deya Miranda MD        [facility]) injection 4 mg  4 mg IntraVENous Q6H PRN Deya Miranda MD        pill splitter   Does not apply Once Wilbur Domingo MD        0.9 % sodium chloride infusion   IntraVENous Continuous Lisbeth Kirkpatrick MD        sodium chloride flush 0.9 % injection 5-40 mL  5-40 mL IntraVENous 2 times per day Lisbeth Kirkpatrick MD   10 mL at 01/03/22 2117    sodium chloride flush 0.9 % injection 5-40 mL  5-40 mL IntraVENous PRN Lisbeth Kirkpatrick MD        0.9 % sodium chloride infusion  25 mL IntraVENous PRN Lisbeth Kirkpatrick MD        clopidogrel (PLAVIX) tablet 75 mg  75 mg Oral Daily Bertha Navarro PA-C   75 mg at 01/05/22 0859    metFORMIN (GLUCOPHAGE) tablet 750 mg  750 mg Oral TID Wilbur Domingo MD   750 mg at 01/05/22 0855    glipiZIDE (GLUCOTROL) tablet 10 mg  10 mg Oral BID AC Wilbur Domingo MD   10 mg at 01/05/22 3034    insulin lispro (HUMALOG) injection vial 0-6 Units  0-6 Units SubCUTAneous TID  Wilbur Domingo MD        insulin lispro (HUMALOG) injection vial 0-3 Units  0-3 Units SubCUTAneous Nightly Wilbur Domingo MD        hydrALAZINE (APRESOLINE) injection 10 mg  10 mg IntraVENous Q6H PRN Wilbur Domingo MD        aspirin EC tablet 81 mg  81 mg Oral Daily Wilbur Domingo MD   81 mg at 01/05/22 0855    atorvastatin (LIPITOR) tablet 40 mg  40 mg Oral Nightly Wilbur Domingo MD   40 mg at 01/04/22 2108    pantoprazole (PROTONIX) tablet 40 mg  40 mg Oral QAM AC Wilbur Domingo MD   40 mg at 01/05/22 0635    enoxaparin (LOVENOX) injection 40 mg  40 mg SubCUTAneous Daily Wilbur Domingo MD   40 mg at 01/05/22 0859        Objective:   BP (!) 163/91   Pulse 96   Temp 98.2 °F (36.8 °C) (Oral)   Resp 18   Ht 5' 9\" (1.753 m)   Wt 240 lb (108.9 kg)   SpO2 99%   BMI 35.44 kg/m²     Physical Exam  Vitals reviewed. Constitutional:       General: He is not in acute distress. Appearance: He is not ill-appearing or diaphoretic. HENT:      Head: Normocephalic and atraumatic. Eyes:      General: No visual field deficit. Extraocular Movements: Extraocular movements intact. Pupils: Pupils are equal, round, and reactive to light. Cardiovascular:      Rate and Rhythm: Normal rate and regular rhythm. Heart sounds: No murmur heard. Pulmonary:      Effort: Pulmonary effort is normal. No respiratory distress. Breath sounds: Normal breath sounds. Abdominal:      General: Bowel sounds are normal. There is no distension. Palpations: Abdomen is soft. Tenderness: There is no abdominal tenderness. Musculoskeletal:         General: Normal range of motion. Cervical back: Normal range of motion. Skin:     General: Skin is warm and dry. Neurological:      Mental Status: He is alert and oriented to person, place, and time. Cranial Nerves: No cranial nerve deficit, dysarthria or facial asymmetry. Sensory: No sensory deficit. Motor: No weakness, tremor, atrophy, abnormal muscle tone, seizure activity or pronator drift. Coordination: Coordination abnormal. Finger-Nose-Finger Test abnormal.      Deep Tendon Reflexes: Reflexes are normal and symmetric. Babinski sign absent on the right side. Babinski sign absent on the left side. Comments: Patient with some mild right upper extremity incoordination And expressive aphasia   Psychiatric:         Mood and Affect: Mood normal.       XR CHEST (2 VW)    Result Date: 12/31/2021  EXAMINATION: XR CHEST (2 VW) CLINICAL HISTORY: DYSARTHRIA COMPARISONS: None available. FINDINGS: Osseous structures are intact. Cardiopericardial silhouette is normal. Pulmonary vasculature is normal. Lungs are clear. NO ACUTE CARDIOPULMONARY DISEASE. CT HEAD WO CONTRAST    Result Date: 12/30/2021  EXAMINATION: CT of the brain without contrast HISTORY: Stroke. Dizziness. Slurred speech. COMPARISON: None available TECHNIQUE: Multiple contiguous axial images were obtained of the brain from the skull base through the vertex. Multiplanar reformats were obtained. FINDINGS: Prominence of the sulci and ventricles compatible with mild generalized parenchymal volume loss. Areas of bilateral supratentorial white matter hypoattenuation are nonspecific but most likely related to chronic small vessel ischemic changes in a patient of this age. Gray-white matter differentiation is preserved. No acute hemorrhage or abnormal extra-axial fluid collection. No mass effect or midline shift. Mild paranasal sinus mucosal thickening. The mastoid air cells are clear. Calvarium is intact. Two right frontal scalp lesions measure approximately 10 mm and 15 mm respectively. The posterior left scalp lesion measures approximately 5 mm and a left frontal scalp lesion measures approximately 5 mm. Clinical correlation is recommended. No acute intracranial process. All CT scans at this facility use dose modulation, iterative reconstruction, and/or weight based dosing when appropriate to reduce radiation dose to as low as reasonably achievable. US CAROTID ARTERY BILATERAL    Result Date: 12/31/2021  EXAMINATION: US CAROTID ARTERY BILATERAL HISTORY:   TIA vs CVA . Slurred speech, right-sided weakness COMPARISON:None TECHNIQUE: Carotid duplex sonograms which include gray scale and color flow evaluation are complimented with spectral waveform analysis. Please refer to chart below for specific carotid velocity measurements. The degree of stenosis recorded on this exam uses the same method of stratification used in the NASCET trials.  This complies with ACR practice guidelines and the Society of radiology in ultrasound consensus statement and provides adequate information for clinical decision making. Society of Radiologists in Ultrasound (SRU) consensus statement was used to estimate internal carotid artery stenosis. RESULT: There is moderate to severe plaque in the left internal carotid artery, predominantly involving the proximal segment. There is mild plaque in right internal carotid artery. There is turbulent flow at the level of the proximal left internal carotid artery indicating significant stenosis, with high resistant waveforms seen in the mid to distal left internal carotid artery. There is antegrade flow identified in both vertebral arteries. ARTERIAL BLOOD FLOW VELOCITY RIGHT PEAK SYSTOLIC VELOCITIES (PSV)                                               Prox CCA    65 cm/s             Mid CCA     79 cm/s              Dist CCA    56 cm/s           Prox ICA    46 cm/s               Mid ICA     106 cm/s            Dist ICA    81 cm/s             Prox ECA    75 cm/s             Prox VERT   76 cm/s              ICA/CCA     1.3                        LEFT PEAK SYSTOLIC VELOCITIES (PSV)  Prox CCA    101 cm/s Mid CCA     70 cm/s Dist CCA    54 cm/s Prox ICA    31 cm/s Mid ICA     46 cm/s Dist ICA    48 cm/s Prox ECA    131 cm/s Prox VERT   44 cm/s ICA/CCA     0.7     HIGH-GRADE STENOSIS OF THE PROXIMAL LEFT INTERNAL CAROTID ARTERY.   <50  % STENOSIS IN THE RIGHT ICA ANTEGRADE FLOW IN THE BILATERAL VERTEBRAL ARTERIES       Lab Results   Component Value Date    WBC 9.6 01/05/2022    RBC 4.34 01/05/2022    HGB 13.0 01/05/2022    HCT 38.0 01/05/2022    MCV 87.6 01/05/2022    MCH 30.1 01/05/2022    MCHC 34.3 01/05/2022    RDW 12.3 01/05/2022     01/05/2022     Lab Results   Component Value Date     01/05/2022    K 4.4 01/05/2022     01/05/2022    CO2 25 01/05/2022    BUN 17 01/05/2022    CREATININE 0.55 01/05/2022    GFRAA >60.0 01/05/2022    LABGLOM >60.0 01/05/2022    GLUCOSE 126 01/05/2022    PROT 6.6 01/05/2022    LABALBU 3.8 01/05/2022    CALCIUM 9.2 01/05/2022    BILITOT 0.4 01/05/2022    ALKPHOS 44 01/05/2022    AST 12 01/05/2022    ALT 16 01/05/2022     Lab Results   Component Value Date    PROTIME 13.1 12/30/2021    INR 1.0 12/30/2021     Lab Results   Component Value Date    TSH 1.340 05/11/2021     Lab Results   Component Value Date    TRIG 170 12/30/2021    HDL 40 12/30/2021    LDLCALC 53 12/30/2021     No results found for: Pamila Juan, CANNAB, COCAINESCRN, LABMETH, OPIATESCREENURINE, PHENCYCLIDINESCREENURINE, PPXUR, ETOH  No results found for: LITHIUM, DILFRTOT, VALPROATE    Assessment:   Left cerebral stroke with subtle right-sided findings with word finding difficulty. Patient has multiple risk factors for cerebrovascular disease. 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 Patient was not on antiplatelet agent. I recommended that we start him on splint for now and Lipitor and complete his work-up with a MRI of the brain and carotids. 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  We will hold off the Plavix for now as he may require urgent intervention. MRI of the brain showed multifocal acute/subacute CVA in left MCA distribution. CTA of the neck showed diffuse stenosis of the left internal carotid artery. Approximately 60% stenosis of the proximal internal carotid artery. U/s of the carotid artery also showed high grade stenosis in the proximal left internal carotid artery. CTA of the head showed mild diffuse narrowing of the distal left internal carotid artery. ECHO with bubble study has not been completed.   Will obtain hypercoagulable work up given patient's age. Will obtain work up for vasculitis given concern. A1c 9  LDL 53  Will add Plavix for DAPT given the severity of stenosis. Will order MEERA with bubble study and cardiology consult for PFO given age. Will consult Dr. Alice Simmons regarding left internal carotid stenosis. Likely not a candidate for endarterectomy given location in internal carotid artery, but Dr. Rohini Kc would like to discuss with him. Remains nonfocal his examination and ambulatory. Patient has left carotid artery stenosis intra and extracranial though this cannot be certain without a cerebral angiogram.  We have discussed this findings with Dr. Alice Simmons and this will be performed on Tuesday in the interim we will arrange for a MEERA    Patient to undergo angiogram to further assess for left carotid stenosis tomorrow. MEERA with bubble study pending  CRP less than 3  Sed rate 9  MPO pending  Hypercoagulable work-up pending  Blood pressure control improved  Continue dual antiplatelet therapy And high intensity statin    MEERA negative  Angiogram results pending  Continue dual antiplatelet therapy   angiogram done, and discussed with Dr Alice Simmons  Intracarnial stenosis, not surgical,  aggressive dual antiplatelet therapy, cholesterol lowering agent  Ok d/c form neuro    Patient to discharge home today  Hypercoagulable work-up remains pending we will follow on outpatient basis  Continue dual antiplatelet therapy and high intensity statin  Follow-up 4 to 6 weeks    I have personally performed a face to face diagnostic evaluation on this patient, reviewed all data and investigations, and am the sole provider of all clinical decisions on the neurological status of this patient. as noted, left multiple infarcts, intracranial stenosis, MEERA normal,  Angio done,  dual antiplatelet therapy  As noted above       Jose C Kc MD, [facility] [place], American Board of Psychiatry & Neurology  Board Certified in Vascular Neurology  Board Certified in Neuromuscular Medicine  Certified in [address]:

## 2022-01-06 ENCOUNTER — TELEPHONE (OUTPATIENT)
Dept: FAMILY MEDICINE CLINIC | Age: 52
End: 2022-01-06

## 2022-01-06 LAB
ANTITHROMBIN ACTIVITY: 125 % (ref 76–128)
PROTEIN C ANTIGEN: >95 % (ref 63–153)
PROTEIN S ANTIGEN, TOTAL: 133 % (ref 84–134)

## 2022-01-06 NOTE — TELEPHONE ENCOUNTER
[facility] Trans[facility] Initial Follow Up Call    Outreach made within 2 business days of discharge: Yes    Patient: Tracy Baez Patient : 1970   MRN: [id number]  Reason for Admission: There are no discharge diagnoses documented for the most recent discharge. Discharge Date: 22       Spoke with: pt    Discharge department/facility: [facility] tele    TCM Interactive Patient Contact:  Was patient able to fill all prescriptions: Yes  Was patient instructed to bring all medications to the follow-up visit: Yes  Is patient taking all medications as directed in the discharge summary? Yes  Does patient understand their discharge instructions: Yes  Does patient have questions or concerns that need addressed prior to 7-14 day follow up office visit: yes - pt requesting VV visit.  appt changed    Scheduled appointment with PCP within 7-14 days    Follow Up  Future Appointments   Date Time Provider Cas Melendez   2022 10:30 AM SALTY Reynaga CNP [facility] EMERGENCY [facility] AT [place]   3/21/2022  1:45 PM SALTY Reynaga CNP [address] [place], [place]

## 2022-01-06 NOTE — TELEPHONE ENCOUNTER
[facility] Trans[facility] Initial Follow Up Call    Outreach made within 2 business days of discharge: Yes    Patient: Margarito Ibanez Patient : 1970   MRN: [id number]  Reason for Admission: There are no discharge diagnoses documented for the most recent discharge.   Discharge Date: 22       Spoke with: Krystian    Discharge department/facility: ortho tele  Scheduled appointment with PCP within 7-14 days    Follow Up  Future Appointments   Date Time Provider Cas Melendez   2022 10:30 AM SALTY Jordan CNP [facility] EMERGENCY [facility] CENTER AT [place]   3/21/2022  1:45 PM SALTY Jordan CNP [address], [place]

## 2022-01-07 LAB
DRVVT CONFIRMATION TEST: ABNORMAL RATIO
DRVVT SCREEN: 30 SEC (ref 33–44)
DRVVT,DIL: ABNORMAL SEC (ref 33–44)
HEXAGONAL PHOSPHOLIPID NEUTRALIZAT TEST: ABNORMAL
LUPUS ANTICOAG INTERP: ABNORMAL
PLT NEUTA: ABNORMAL
PT D: 13.4 SEC (ref 12–15.5)
PTT D: 43 SEC (ref 32–48)
PTT-D CORR REFLEX: ABNORMAL SEC (ref 32–48)
PTT-HEPARIN NEUTRALIZED: ABNORMAL SEC (ref 32–48)
REPTILASE TIME: ABNORMAL SEC
THROMBIN TIME: ABNORMAL SEC (ref 14.7–19.5)

## 2022-01-10 ENCOUNTER — TELEPHONE (OUTPATIENT)
Dept: NEUROLOGY | Age: 52
End: 2022-01-10

## 2022-01-11 ENCOUNTER — VIRTUAL VISIT (OUTPATIENT)
Dept: FAMILY MEDICINE CLINIC | Age: 52
End: 2022-01-11

## 2022-01-11 DIAGNOSIS — E11.42 TYPE 2 DIABETES MELLITUS WITH DIABETIC POLYNEUROPATHY, WITHOUT LONG-TERM CURRENT USE OF INSULIN (HCC): ICD-10-CM

## 2022-01-11 DIAGNOSIS — I10 ESSENTIAL HYPERTENSION: ICD-10-CM

## 2022-01-11 DIAGNOSIS — I63.232 CEREBROVASCULAR ACCIDENT (CVA) DUE TO STENOSIS OF LEFT CAROTID ARTERY (HCC): Primary | ICD-10-CM

## 2022-01-11 PROCEDURE — 99496 TRANSJ CARE MGMT HIGH F2F 7D: CPT | Performed by: NURSE PRACTITIONER

## 2022-01-11 ASSESSMENT — ENCOUNTER SYMPTOMS
COUGH: 0
SHORTNESS OF BREATH: 0

## 2022-01-11 NOTE — TELEPHONE ENCOUNTER
Went through all the notes from his hospital stay, and there is nothing seen from any of the notes about the pt needing to be under any type of supervision while at home due to a fall risk, r any other reasoning either. Please advise what to do and if this letter is still allowed.

## 2022-01-11 NOTE — PROGRESS NOTES
Patricia Flores, was evaluated through a synchronous (real-time) audio-video encounter. The patient (or guardian if applicable) is aware that this is a billable service. Verbal consent to proceed has been obtained within the past 12 months. The visit was conducted pursuant to the emergency declaration under the [facility], [facility] authority and the [employer] Act. Patient identification was verified, and a caregiver was present when appropriate. The patient was located in a state where the provider was credentialed to provide care. --SALTY Muse CNP on 1/11/2022 at 10:58 AM    An electronic signature was used to authenticate this note. [address]  [address] [facility], [address] [facility]  Dept: [phone]  Dept Fax: [phone]: [phone]    Transition Care Office Visit    Date of Face-to-Face: 1/11/2022    Persons at visit: patient    Virginia Lua was hospitalized from 12/30/21  to 1/5/2022 at [facility]    Here for follow after hospitalization for: Cerebral Vascular Accident    Patient was contacted by telephone on 1/6/2022    Activity:activity as tolerated    Any medication changes since post-hospitalization phone call? No    Any treatment changes since post-hospitalization phone call? No    Anyfurther education needed on medications/treatment plan?  No      Current Medication List  Current Outpatient Medications on File Prior to Visit   Medication Sig Dispense Refill    aspirin 81 MG EC tablet Take 1 tablet by mouth daily 30 tablet 3    glipiZIDE (GLUCOTROL) 10 MG tablet Take 1 tablet by mouth 2 times daily (before meals) 60 tablet 3    clopidogrel (PLAVIX) 75 MG tablet Take 1 tablet by mouth daily 30 tablet 3    atorvastatin (LIPITOR) 40 MG tablet Take 1 tablet by mouth nightly 30 tablet 3    lisinopril (PRINIVIL;ZESTRIL) 20 MG tablet Take 20 mg by mouth daily      metFORMIN (GLUCOPHAGE) 500 MG tablet Take 2 tablets by mouth 2 times daily (with meals) (Patient taking differently: Take 750 mg by mouth 3 times daily 3 times daily.) 360 tablet 1    fluticasone (FLONASE) 50 MCG/ACT nasal spray 2 sprays by Each Nostril route daily 1 Bottle 0     No current facility-administered medications on file prior to visit. Medication Reconciliation  Provider has reviewed medication record and it has been modified as necessary to accurately depict current medications since hospitalization. Roro Nugent has beeninstructed on these changes. See transitional care telephone note. HPI:     HPI    Dx with CVA recently. Still having some generalized right sided weakness. Is improving. Has fu with neurology (Dr. Sabra Nath). No other questions     Blood sugar has been improving since hospitalization. Review of Systems:     Review of Systems   Constitutional: Positive for fatigue. Respiratory: Negative for cough and shortness of breath. Cardiovascular: Negative for chest pain. Neurological: Positive for weakness. Exam:   There were no vitals taken for this visit. Physical Exam  N/a    Assessment:        Diagnosis Orders   1. Cerebrovascular accident (CVA) due to stenosis of left carotid artery (HCC)  Some residual weakness. Improving. 2. Type 2 diabetes mellitus with diabetic polyneuropathy, without long-term current use of insulin (HCC)  BS has been coming down since hospitalization   3. Essential hypertension  Well controlled         Plan:     Has fu scheduled in march with .me    Continue to work on DNA13, diet, exercise. Side effects, adverse effects of the medication prescribed today, as well as treatment plan/ rationale and result expectations have been discussed with the patient who expresses understanding and desires to proceed.     Close follow up to evaluate treatment results and for coordination of care. I have reviewed the patient's medical history in detail and updated the computerized patient record. As always, patient is advised that if symptoms worsen in any way they will proceed to the nearest emergency room.      Diagnostic Tests performed in hospital: multiple images  Requested/reviewed: Yes    Disease Education:  Hyperlipidemia, Cerebral Vascular Accident, Diabetes Mellitus      Home Health Care :  None      Discussedwith other providers: none          Note:  CPT Coding - 83204 (Moderate level - seen within 14 days)      30304 (High level - seen within 7 days)  Needs to have associated phone contact within2 business days of inpatient discharge    Electronically signed by SALTY Ramirez CNP on 1/11/2022 at 10:58 AM

## 2022-01-14 ENCOUNTER — TELEPHONE (OUTPATIENT)
Dept: NEUROLOGY | Age: 52
End: 2022-01-14

## 2022-01-14 NOTE — TELEPHONE ENCOUNTER
Pt's sloan called, states that he was d/c from the hospital last week, and today both of his feet are very swollen, not having any pain. States that PCP is out of office today wants to know if anything should be done about it. Please advise. Thank you.

## 2022-01-14 NOTE — TELEPHONE ENCOUNTER
We do not treat lower extremity edema. Patient should call the on-call provider for his primary care or go to urgent care if it is concerning. There are multiple reasons for lower extremity edema some not serious and some potentially very serious.

## 2022-01-17 DIAGNOSIS — M79.89 LEG SWELLING: Primary | ICD-10-CM

## 2022-01-17 RX ORDER — FUROSEMIDE 20 MG/1
20 TABLET ORAL DAILY
Qty: 30 TABLET | Refills: 1 | Status: SHIPPED | OUTPATIENT
Start: 2022-01-17 | End: 2022-06-01 | Stop reason: SDUPTHER

## 2022-01-17 NOTE — TELEPHONE ENCOUNTER
Tracy Ivory calling they spoke with Dr Leta Gatica yesterday pt's feet are both swollen last few days. Dr Leta Gatica told him to call PCP  To see if he can get a script for a diuretic?         PT uses [facility] [phone]

## 2022-02-09 ENCOUNTER — OFFICE VISIT (OUTPATIENT)
Dept: NEUROLOGY | Age: 52
End: 2022-02-09
Payer: MEDICAID

## 2022-02-09 VITALS
HEART RATE: 94 BPM | BODY MASS INDEX: 36.33 KG/M2 | SYSTOLIC BLOOD PRESSURE: 130 MMHG | WEIGHT: 246 LBS | DIASTOLIC BLOOD PRESSURE: 82 MMHG

## 2022-02-09 DIAGNOSIS — I63.232 CEREBROVASCULAR ACCIDENT (CVA) DUE TO STENOSIS OF LEFT CAROTID ARTERY (HCC): Primary | ICD-10-CM

## 2022-02-09 DIAGNOSIS — R27.0 ATAXIA: ICD-10-CM

## 2022-02-09 DIAGNOSIS — D36.10 NEUROFIBROMA: ICD-10-CM

## 2022-02-09 DIAGNOSIS — G45.9 TIA (TRANSIENT ISCHEMIC ATTACK): ICD-10-CM

## 2022-02-09 DIAGNOSIS — I63.9 ACUTE CVA (CEREBROVASCULAR ACCIDENT) (HCC): ICD-10-CM

## 2022-02-09 DIAGNOSIS — R47.01 APHASIA: ICD-10-CM

## 2022-02-09 PROCEDURE — 99214 OFFICE O/P EST MOD 30 MIN: CPT | Performed by: PSYCHIATRY & NEUROLOGY

## 2022-02-09 ASSESSMENT — ENCOUNTER SYMPTOMS
SHORTNESS OF BREATH: 0
COLOR CHANGE: 0
BACK PAIN: 0
PHOTOPHOBIA: 0
CHOKING: 0
VOMITING: 0
TROUBLE SWALLOWING: 0
NAUSEA: 0

## 2022-02-09 NOTE — PROGRESS NOTES
Subjective:      Patient ID: Azucena Suarez is a 45 y.o. male who presents today for:  Chief Complaint   Patient presents with    Follow-Up from Hospital     pt states hes doing good       HPI 45 a right-handed female with a history of stroke. Patient had left cerebral stroke with subtle right-sided findings. Patient has multiple risk factors for some vascular disease including also plexiform neurofibromatosis. Patient was not on antiplatelet agent and we started him on aspirin and Lipitor. All his evaluations were normal.  Patient had a high-grade stenosis of the left internal carotid artery ultrasound or CT angiogram and therefore he did not require any intervention further. Patient actually is doing well and is recovered with his balance is appears to be quite off. Is working at home with his exercise program.  He was a  and therefore we recommended not to drive. Is not any spasms or falls or bleeding or bruising or choking.     Past Medical History:   Diagnosis Date    Hypertension     Type 2 diabetes mellitus ([facility] [address]      Past Surgical History:   Procedure Laterality Date    VASCULAR SURGERY Right 1/4/2022    RIGHT CAROTID DIGITAL SUBTRACTION ARTERIOGRAM VIA RIGHT FEMORAL ARTERY performed by Kei Sim MD at [address] [place] [address] History     Socioeconomic History    Marital status: Single     Spouse name: Not on file    Number of children: Not on file    Years of education: Not on file    Highest education level: Not on file   Occupational History    Not on file   Tobacco Use    Smoking status: Never Smoker    Smokeless tobacco: Never Used   Substance and Sexual Activity    Alcohol use: No     Alcohol/week: 0.0 standard drinks    Drug use: No    Sexual activity: Not on file   Other Topics Concern    Not on file   Social History Narrative    Not on file     Social Determinants of Health     Financial Resource Strain: Low Risk     Difficulty of Paying Living Expenses: Not hard at all   Food Insecurity: No Food Insecurity    Worried About [facility] in the Last Year: Never true    Tai of Food in the Last Year: Never true   Transportation Needs: No Transportation Needs    Lack of Transportation (Medical): No    Lack of Transportation (Non-Medical): No   Physical Activity:     Days of Exercise per Week: Not on file    Minutes of Exercise per Session: Not on file   Stress:     Feeling of Stress : Not on file   Social Connections:     Frequency of Communication with Friends and Family: Not on file    Frequency of Social Gatherings with Friends and Family: Not on file    Attends [employer] Services: Not on file    Active Member of Clubs or Organizations: Not on file    Attends Club or Organization Meetings: Not on file    Marital Status: Not on file   Intimate Partner Violence:     Fear of Current or Ex-Partner: Not on file    Emotionally Abused: Not on file    Physically Abused: Not on file    Sexually Abused: Not on file   Housing Stability:     Unable to Pay for Housing in the Last Year: Not on file    Number of [place] in the Last Year: Not on file    Unstable Housing in the Last Year: Not on file     Family History   Problem Relation Age of Onset    Rheum Arthritis Mother     Osteoarthritis Mother     Hypertension Mother     Cancer Mother         uterine cancer    Other Father         myocardial infarction    Diabetes Father      Allergies   Allergen Reactions    Penicillins Hives and Swelling       Current Outpatient Medications   Medication Sig Dispense Refill    metFORMIN (GLUCOPHAGE) 500 MG tablet TAKE 2 TABLETS BY MOUTH TWICE DAILY WITH MEALS. (GENERIC FOR GLUCOPHAGE) 360 tablet 1    lisinopril (PRINIVIL;ZESTRIL) 20 MG tablet TAKE 1 TABLET BY MOUTH DAILY.  (GENERIC FOR PRINIVIL) 90 tablet 1    furosemide (LASIX) 20 MG tablet Take 1 tablet by mouth daily 30 tablet 1    aspirin 81 MG EC tablet Take 1 tablet by mouth daily 30 tablet 3  glipiZIDE (GLUCOTROL) 10 MG tablet Take 1 tablet by mouth 2 times daily (before meals) 60 tablet 3    clopidogrel (PLAVIX) 75 MG tablet Take 1 tablet by mouth daily 30 tablet 3    atorvastatin (LIPITOR) 40 MG tablet Take 1 tablet by mouth nightly 30 tablet 3    fluticasone (FLONASE) 50 MCG/ACT nasal spray 2 sprays by Each Nostril route daily 1 Bottle 0     No current facility-administered medications for this visit. Review of Systems   Constitutional: Negative for fever. HENT: Negative for ear pain, tinnitus and trouble swallowing. Eyes: Negative for photophobia and visual disturbance. Respiratory: Negative for choking and shortness of breath. Cardiovascular: Negative for chest pain and palpitations. Gastrointestinal: Negative for nausea and vomiting. Musculoskeletal: Positive for gait problem. Negative for back pain, joint swelling, myalgias, neck pain and neck stiffness. Skin: Negative for color change. Allergic/Immunologic: Negative for food allergies. Neurological: Positive for weakness. Negative for dizziness, tremors, seizures, syncope, facial asymmetry, speech difficulty, light-headedness, numbness and headaches. Psychiatric/Behavioral: Negative for behavioral problems, confusion, hallucinations and sleep disturbance. Objective:   /82 (Site: Left Upper Arm, Position: Sitting, Cuff Size: Medium Adult)   Pulse 94   Wt 246 lb (111.6 kg)   BMI 36.33 kg/m²     Physical Exam  Vitals reviewed. Eyes:      Pupils: Pupils are equal, round, and reactive to light. Cardiovascular:      Rate and Rhythm: Normal rate and regular rhythm. Heart sounds: No murmur heard. Pulmonary:      Effort: Pulmonary effort is normal.      Breath sounds: Normal breath sounds. Abdominal:      General: Bowel sounds are normal.   Musculoskeletal:         General: Normal range of motion. Cervical back: Normal range of motion. Skin:     General: Skin is warm. Neurological:      Mental Status: He is alert and oriented to person, place, and time. Cranial Nerves: No cranial nerve deficit. Sensory: No sensory deficit. Motor: No abnormal muscle tone. Coordination: Coordination normal.      Deep Tendon Reflexes: Reflexes are normal and symmetric. Babinski sign absent on the right side. Babinski sign absent on the left side. Psychiatric:         Mood and Affect: Mood normal.     Very subtle decreased fine finger movements on the right with prominent gait ataxia is notable is likely that is somewhat hemiparetic which is very subtle    CTA HEAD W WO CONTRAST    Result Date: 12/31/2021  EXAM: CTA NECK W AND OR WO CONTRAST EXAM: CTA HEAD W AND OR WO CONTRAST INDICATION:   high grade carotid stenosis COMPARISON: Same day bilateral carotid ultrasounds and MR brain. TECHNIQUE: Helically acquired axial images were obtained from the aortic arch through the vertex at 8.91 mm slice thickness. Sagittal and coronal reconstructed images were generated. Subsequent MIP images were rendered using a separate 3-D workstation. All CT scans at this facility use dose modulation, iterative reconstruction, and/or weight based dosing when appropriate to reduce radiation dose to as low as reasonably achievable. CONTRAST: A total of 100 mL of Isovue-300 was given intravenously. FINDINGS: The vertebral arteries have normal course, contour, and caliber throughout the cervical portions. The vertebrobasilar system is normal.  Normal contrast enhancement is present within both vertebral arteries, the basilar artery, and both posterior cerebral arteries. The common carotid arteries have normal course, contour, and caliber. The external carotid arteries have normal contrast enhancement.  There are noncalcified atherosclerotic plaques involving the left carotid bulb and the proximal internal carotid artery extending to the cervical portions, with resultant diffuse stenosis of the internal carotid artery. There is approximately 60% stenosis of the proximal internal carotid artery. There is mild diffuse narrowing of the distal left internal carotid artery, involving the distal cervical, petrous, cavernous and supraclinoid portions, resulting in less than 50% stenosis. There is no significant stenosis or occlusion of the right internal carotid artery and the right distal internal carotid artery. There is normal flow signal in the A1 and A2 segments bilaterally. The M1 and M2 segments also have normal flow signal bilaterally. There is no focal stenosis, occlusion, or aneurysm demonstrated in the head. There are multiple soft tissue scalp nodules. There is partial opacification of the bilateral maxillary and bilateral ethmoid air cells. The mastoids air cells are well aerated. The visualized orbits are unremarkable. The known left MCA infarcts are better visualized on the same day MR brain. There are scattered degenerative changes of the cervical spine. The upper airway is patent. CTA Head 1. Mild diffuse narrowing of the distal left internal carotid artery. CTA Neck 1. There are noncalcified atherosclerotic plaques resulting in diffuse narrowing of the left internal carotid artery extending to the cranially, with approximately 60% stenosis at the proximal segment. However, inflammation/vasculitis may result in similar findings. XR CHEST (2 VW)    Result Date: 12/31/2021  EXAMINATION: XR CHEST (2 VW) CLINICAL HISTORY: DYSARTHRIA COMPARISONS: None available. FINDINGS: Osseous structures are intact. Cardiopericardial silhouette is normal. Pulmonary vasculature is normal. Lungs are clear. NO ACUTE CARDIOPULMONARY DISEASE. CT HEAD WO CONTRAST    Result Date: 12/30/2021  EXAMINATION: CT of the brain without contrast HISTORY: Stroke. Dizziness. Slurred speech. COMPARISON: None available TECHNIQUE: Multiple contiguous axial images were obtained of the brain from the skull base through the vertex. Multiplanar reformats were obtained. FINDINGS: Prominence of the sulci and ventricles compatible with mild generalized parenchymal volume loss. Areas of bilateral supratentorial white matter hypoattenuation are nonspecific but most likely related to chronic small vessel ischemic changes in a patient of this age. Gray-white matter differentiation is preserved. No acute hemorrhage or abnormal extra-axial fluid collection. No mass effect or midline shift. Mild paranasal sinus mucosal thickening. The mastoid air cells are clear. Calvarium is intact. Two right frontal scalp lesions measure approximately 10 mm and 15 mm respectively. The posterior left scalp lesion measures approximately 5 mm and a left frontal scalp lesion measures approximately 5 mm. Clinical correlation is recommended. No acute intracranial process. All CT scans at this facility use dose modulation, iterative reconstruction, and/or weight based dosing when appropriate to reduce radiation dose to as low as reasonably achievable. CTA NECK W WO CONTRAST    Result Date: 12/31/2021  EXAM: CTA NECK W AND OR WO CONTRAST EXAM: CTA HEAD W AND OR WO CONTRAST INDICATION:   high grade carotid stenosis COMPARISON: Same day bilateral carotid ultrasounds and MR brain. TECHNIQUE: Helically acquired axial images were obtained from the aortic arch through the vertex at 5.55 mm slice thickness. Sagittal and coronal reconstructed images were generated. Subsequent MIP images were rendered using a separate 3-D workstation. All CT scans at this facility use dose modulation, iterative reconstruction, and/or weight based dosing when appropriate to reduce radiation dose to as low as reasonably achievable. CONTRAST: A total of 100 mL of Isovue-300 was given intravenously. FINDINGS: The vertebral arteries have normal course, contour, and caliber throughout the cervical portions.  The vertebrobasilar system is normal.  Normal contrast enhancement is present within both vertebral arteries, the basilar artery, and both posterior cerebral arteries. The common carotid arteries have normal course, contour, and caliber. The external carotid arteries have normal contrast enhancement. There are noncalcified atherosclerotic plaques involving the left carotid bulb and the proximal internal carotid artery extending to the cervical portions, with resultant diffuse stenosis of the internal carotid artery. There is approximately 60% stenosis of the proximal internal carotid artery. There is mild diffuse narrowing of the distal left internal carotid artery, involving the distal cervical, petrous, cavernous and supraclinoid portions, resulting in less than 50% stenosis. There is no significant stenosis or occlusion of the right internal carotid artery and the right distal internal carotid artery. There is normal flow signal in the A1 and A2 segments bilaterally. The M1 and M2 segments also have normal flow signal bilaterally. There is no focal stenosis, occlusion, or aneurysm demonstrated in the head. There are multiple soft tissue scalp nodules. There is partial opacification of the bilateral maxillary and bilateral ethmoid air cells. The mastoids air cells are well aerated. The visualized orbits are unremarkable. The known left MCA infarcts are better visualized on the same day MR brain. There are scattered degenerative changes of the cervical spine. The upper airway is patent. CTA Head 1. Mild diffuse narrowing of the distal left internal carotid artery. CTA Neck 1. There are noncalcified atherosclerotic plaques resulting in diffuse narrowing of the left internal carotid artery extending to the cranially, with approximately 60% stenosis at the proximal segment. However, inflammation/vasculitis may result in similar findings.     MRI BRAIN WO CONTRAST    Result Date: 12/31/2021  EXAMINATION:  MRI BRAIN WO CONTRAST HISTORY:  New onset slurred speech TECHNIQUE:  MRI brain routine protocol without contrast. COMPARISON:  CT brain 12/30/2021 RESULT: Acute Change:  Multiple foci of restricted diffusion along the left centrum semiovale, corona radiata and frontal operculum compatible with acute/subacute left MCA distribution infarcts. Hemorrhage:  No evidence of prior parenchymal hemorrhage on the susceptibility weighted sequences. Mass Lesion/ Mass Effect:  No evidence of an intracranial mass or extra-axial fluid collection. No significant mass effect. Chronic Change:  Increased T2/FLAIR signal within the area of acute infarct in the left frontal lobe. Parenchyma:  No significant parenchymal volume loss for age. Ventricles:  Normal caliber and morphology. Skull Base:  Hypothalamic and pituitary region are grossly normal. Craniocervical junction is normal. No significant marrow replacement process. Vasculature:  Major intracranial arteries and dural venous sinuses demonstrate typical flow voids, suggesting patency by spin echo criteria. Other:  Bilateral maxillary sinus mucus retention cysts. Mild paranasal sinus mucosal thickening otherwise. Mastoid air cells are clear. The orbits are unremarkable. Multiple cutaneous soft tissue lesions predominantly along the right face. Multiple foci of left frontal lobe restricted diffusion compatible with acute/subacute Left MCA distribution infarct. No hemorrhage. COMMUNICATION:  Communicated with: Abdullahi Quinn ER nurse on 12/31/2021 at 12:31 PM 12:31 PM.       US CAROTID ARTERY BILATERAL    Result Date: 12/31/2021  EXAMINATION: US CAROTID ARTERY BILATERAL HISTORY:   TIA vs CVA . Slurred speech, right-sided weakness COMPARISON:None TECHNIQUE: Carotid duplex sonograms which include gray scale and color flow evaluation are complimented with spectral waveform analysis. Please refer to chart below for specific carotid velocity measurements. The degree of stenosis recorded on this exam uses the same method of stratification used in the NASCET trials.  This complies with ACR practice guidelines and the Society of radiology in ultrasound consensus statement and provides adequate information for clinical decision making. Society of Radiologists in Ultrasound (SRU) consensus statement was used to estimate internal carotid artery stenosis. RESULT: There is moderate to severe plaque in the left internal carotid artery, predominantly involving the proximal segment. There is mild plaque in right internal carotid artery. There is turbulent flow at the level of the proximal left internal carotid artery indicating significant stenosis, with high resistant waveforms seen in the mid to distal left internal carotid artery. There is antegrade flow identified in both vertebral arteries. ARTERIAL BLOOD FLOW VELOCITY RIGHT PEAK SYSTOLIC VELOCITIES (PSV)                                               Prox CCA    65 cm/s             Mid CCA     79 cm/s              Dist CCA    56 cm/s           Prox ICA    46 cm/s               Mid ICA     106 cm/s            Dist ICA    81 cm/s             Prox ECA    75 cm/s             Prox VERT   76 cm/s              ICA/CCA     1.3                        LEFT PEAK SYSTOLIC VELOCITIES (PSV)  Prox CCA    101 cm/s Mid CCA     70 cm/s Dist CCA    54 cm/s Prox ICA    31 cm/s Mid ICA     46 cm/s Dist ICA    48 cm/s Prox ECA    131 cm/s Prox VERT   44 cm/s ICA/CCA     0.7     HIGH-GRADE STENOSIS OF THE PROXIMAL LEFT INTERNAL CAROTID ARTERY.   <50  % STENOSIS IN THE RIGHT ICA ANTEGRADE FLOW IN THE BILATERAL VERTEBRAL ARTERIES       Lab Results   Component Value Date    WBC 9.6 01/05/2022    RBC 4.34 01/05/2022    HGB 13.0 01/05/2022    HCT 38.0 01/05/2022    MCV 87.6 01/05/2022    MCH 30.1 01/05/2022    MCHC 34.3 01/05/2022    RDW 12.3 01/05/2022     01/05/2022     Lab Results   Component Value Date     01/05/2022    K 4.4 01/05/2022     01/05/2022    CO2 25 01/05/2022    BUN 17 01/05/2022    CREATININE 0.55 01/05/2022    GFRAA >60.0 01/05/2022    LABGLOM >60.0 01/05/2022    GLUCOSE 126 01/05/2022    PROT 6.6 01/05/2022    LABALBU 3.8 01/05/2022    CALCIUM 9.2 01/05/2022    BILITOT 0.4 01/05/2022    ALKPHOS 44 01/05/2022    AST 12 01/05/2022    ALT 16 01/05/2022     Lab Results   Component Value Date    PROTIME 13.1 12/30/2021    INR 1.0 12/30/2021     Lab Results   Component Value Date    TSH 1.340 05/11/2021     Lab Results   Component Value Date    TRIG 170 12/30/2021    HDL 40 12/30/2021    LDLCALC 53 12/30/2021     No results found for: Briseyda Sebastian, LABBENZ, CANNAB, COCAINESCRN, LABMETH, OPIATESCREENURINE, PHENCYCLIDINESCREENURINE, PPXUR, ETOH  No results found for: LITHIUM, DILFRTOT, VALPROATE    Assessment:       Diagnosis Orders   1. Cerebrovascular accident (CVA) due to stenosis of left carotid artery ([address]     2. Acute CVA (cerebrovascular accident) ([address]     3. TIA (transient ischemic attack)     4. Aphasia     5. Neurofibroma     6. Ataxia     Left cerebral stroke the left frontal lobe compatible with an acute infarct. Patient had initial carotid stenosis of 90% repeat CT angiogram showing only 60% and therefore we treated him medically. He was not on antiplatelet agent and continued on Plavix alone. He is also now on Lipitor all his risk factors are now well managed which were not before. He proving though he still has central ataxia. He is a  by profession therefore we recommended for him not to drive the next 3 to 4 months until he is completely better. We will review in 4 months. I have recommended he continue the exercise program at home and do as much as he can as we still have time for improvement. Inpatient hospital records reviewed with him and will continue to follow him. He also had a MEERA which was negative. Plan:      No orders of the defined types were placed in this encounter. No orders of the defined types were placed in this encounter.       Return in about 4 months (around 6/9/2022).       Sara Rodney MD

## 2022-02-14 ENCOUNTER — TELEPHONE (OUTPATIENT)
Dept: FAMILY MEDICINE CLINIC | Age: 52
End: 2022-02-14

## 2022-02-14 DIAGNOSIS — E11.42 TYPE 2 DIABETES MELLITUS WITH DIABETIC POLYNEUROPATHY, WITHOUT LONG-TERM CURRENT USE OF INSULIN (HCC): Primary | ICD-10-CM

## 2022-02-17 RX ORDER — PIOGLITAZONEHYDROCHLORIDE 30 MG/1
30 TABLET ORAL DAILY
Qty: 30 TABLET | Refills: 5 | Status: SHIPPED | OUTPATIENT
Start: 2022-02-17 | End: 2022-08-22 | Stop reason: SDUPTHER

## 2022-02-23 ASSESSMENT — ENCOUNTER SYMPTOMS
COLOR CHANGE: 0
WHEEZING: 0
PHOTOPHOBIA: 0
BACK PAIN: 0
NAUSEA: 0
TROUBLE SWALLOWING: 0
SHORTNESS OF BREATH: 0
CHOKING: 0
VOMITING: 0

## 2022-03-21 ENCOUNTER — TELEMEDICINE (OUTPATIENT)
Dept: FAMILY MEDICINE CLINIC | Age: 52
End: 2022-03-21

## 2022-03-21 DIAGNOSIS — I63.9 ACUTE CVA (CEREBROVASCULAR ACCIDENT) (HCC): ICD-10-CM

## 2022-03-21 DIAGNOSIS — I10 ESSENTIAL HYPERTENSION: ICD-10-CM

## 2022-03-21 DIAGNOSIS — E11.42 TYPE 2 DIABETES MELLITUS WITH DIABETIC POLYNEUROPATHY, WITHOUT LONG-TERM CURRENT USE OF INSULIN (HCC): Primary | ICD-10-CM

## 2022-03-21 PROCEDURE — 99213 OFFICE O/P EST LOW 20 MIN: CPT | Performed by: NURSE PRACTITIONER

## 2022-03-21 ASSESSMENT — ENCOUNTER SYMPTOMS
COUGH: 0
CONSTIPATION: 0
SHORTNESS OF BREATH: 0
DIARRHEA: 0

## 2022-03-21 NOTE — PROGRESS NOTES
3/21/2022    TELEHEALTH EVALUATION -- Audio/Visual (During BQCCA-30 public health emergency)    Due to [place] outbreak, patient's office visit was converted to a virtual visit. Patient was contacted and agreed to proceed with a virtual visit via [place]. me  The risks and benefits of converting to a virtual visit were discussed in light of the current infectious disease epidemic. Patient also understood that insurance coverage and co-pays are up to their individual insurance plans. HPI:    La Rendon (:  1970) has requested an audio/video evaluation for the following concern(s):    Doing better since stroke. Walking with a cane instead of walker. BP has been doing better. Follow up for diabetes. BS was 126 recently. Has been running 125-180. No side effects of medications. Leg swelling- taking lasix every other day    No other questions/concerns. Review of Systems   Constitutional: Negative for fatigue. Respiratory: Negative for cough and shortness of breath. Cardiovascular: Negative for chest pain. Gastrointestinal: Negative for constipation and diarrhea. Prior to Visit Medications    Medication Sig Taking? Authorizing Provider   pioglitazone (ACTOS) 30 MG tablet Take 1 tablet by mouth daily Yes Oral SALTY Soni CNP   metFORMIN (GLUCOPHAGE) 500 MG tablet TAKE 2 TABLETS BY MOUTH TWICE DAILY WITH MEALS. (GENERIC FOR GLUCOPHAGE) Yes Emily Montes DO   lisinopril (PRINIVIL;ZESTRIL) 20 MG tablet TAKE 1 TABLET BY MOUTH DAILY.  (GENERIC FOR PRINIVIL) Yes Emily Montes DO   furosemide (LASIX) 20 MG tablet Take 1 tablet by mouth daily Yes Oral SALTY Soni CNP   aspirin 81 MG EC tablet Take 1 tablet by mouth daily Yes Svetlana Garrett MD   glipiZIDE (GLUCOTROL) 10 MG tablet Take 1 tablet by mouth 2 times daily (before meals) Yes Svetlana Garrett MD   clopidogrel (PLAVIX) 75 MG tablet Take 1 tablet by mouth daily Yes Svetlana Garrett MD   atorvastatin (LIPITOR) 40 MG tablet Take 1 tablet by mouth nightly Yes Keanu Nguyen MD   fluticasone (FLONASE) 50 MCG/ACT nasal spray 2 sprays by Each Nostril route daily Yes SALTY Serna - CNP       Social History     Tobacco Use    Smoking status: Never Smoker    Smokeless tobacco: Never Used   Substance Use Topics    Alcohol use: No     Alcohol/week: 0.0 standard drinks    Drug use: No        Allergies   Allergen Reactions    Penicillins Hives and Swelling   ,   Past Medical History:   Diagnosis Date    Hypertension     Type 2 diabetes mellitus ([facility] [address]    ,   Past Surgical History:   Procedure Laterality Date    VASCULAR SURGERY Right 1/4/2022    RIGHT CAROTID DIGITAL SUBTRACTION ARTERIOGRAM VIA RIGHT FEMORAL ARTERY performed by Marilyn Feliciano MD at [facility]   ,   Social History     Tobacco Use    Smoking status: Never Smoker    Smokeless tobacco: Never Used   Substance Use Topics    Alcohol use: No     Alcohol/week: 0.0 standard drinks    Drug use: No   ,   Family History   Problem Relation Age of Onset    Rheum Arthritis Mother     Osteoarthritis Mother     Hypertension Mother     Cancer Mother         uterine cancer    Other Father         myocardial infarction    Diabetes Father        PHYSICAL EXAMINATION:  [ INSTRUCTIONS:  \"[x]\" Indicates a positive item  \"[]\" Indicates a negative item  -- DELETE ALL ITEMS NOT EXAMINED]  [x] Alert  [x] Oriented to person/place/time    [x] No apparent distress  [] Toxic appearing    [] Face flushed appearing [x] Sclera clear  [] Lips are cyanotic      [x] Breathing appears normal  [] Appears tachypneic      [] Rash on visible skin    [] Cranial Nerves II-XII grossly intact    [] Motor grossly intact in visible upper extremities    [] Motor grossly intact in visible lower extremities    [x] Normal Mood  [] Anxious appearing    [] Depressed appearing  [] Confused appearing      [] Poor short term memory  [] Poor long term memory    [] OTHER:      Due to this

## 2022-03-24 ENCOUNTER — NURSE ONLY (OUTPATIENT)
Dept: FAMILY MEDICINE CLINIC | Age: 52
End: 2022-03-24

## 2022-03-24 DIAGNOSIS — E11.42 TYPE 2 DIABETES MELLITUS WITH DIABETIC POLYNEUROPATHY, WITHOUT LONG-TERM CURRENT USE OF INSULIN (HCC): Primary | ICD-10-CM

## 2022-03-24 LAB — HBA1C MFR BLD: 7.9 %

## 2022-03-24 PROCEDURE — 3051F HG A1C>EQUAL 7.0%<8.0%: CPT | Performed by: NURSE PRACTITIONER

## 2022-03-24 PROCEDURE — 83036 HEMOGLOBIN GLYCOSYLATED A1C: CPT | Performed by: NURSE PRACTITIONER

## 2022-03-24 NOTE — PROGRESS NOTES
Pt in office today per Agatha Rankin CNP for POCT A1C testing. See labs, testing was 7.9. pt aware to continue monitoring diet and simple sugars. Pt has follow up in June, aware that we will retest then.

## 2022-04-12 DIAGNOSIS — I10 ESSENTIAL HYPERTENSION: Primary | ICD-10-CM

## 2022-04-12 RX ORDER — LISINOPRIL 20 MG/1
TABLET ORAL
Qty: 90 TABLET | Refills: 1 | Status: SHIPPED | OUTPATIENT
Start: 2022-04-12 | End: 2022-08-22 | Stop reason: SDUPTHER

## 2022-05-02 DIAGNOSIS — I10 ESSENTIAL HYPERTENSION: Primary | ICD-10-CM

## 2022-05-02 DIAGNOSIS — I63.9 ACUTE CVA (CEREBROVASCULAR ACCIDENT) (HCC): ICD-10-CM

## 2022-05-02 DIAGNOSIS — E11.42 TYPE 2 DIABETES MELLITUS WITH DIABETIC POLYNEUROPATHY, WITHOUT LONG-TERM CURRENT USE OF INSULIN (HCC): ICD-10-CM

## 2022-05-02 RX ORDER — ASPIRIN 81 MG/1
81 TABLET ORAL DAILY
Qty: 30 TABLET | Refills: 3 | Status: SHIPPED | OUTPATIENT
Start: 2022-05-02 | End: 2022-08-22 | Stop reason: SDUPTHER

## 2022-05-02 RX ORDER — ATORVASTATIN CALCIUM 40 MG/1
40 TABLET, FILM COATED ORAL NIGHTLY
Qty: 30 TABLET | Refills: 3 | Status: SHIPPED | OUTPATIENT
Start: 2022-05-02 | End: 2022-08-22 | Stop reason: SDUPTHER

## 2022-05-02 RX ORDER — GLIPIZIDE 10 MG/1
10 TABLET ORAL
Qty: 60 TABLET | Refills: 3 | Status: SHIPPED | OUTPATIENT
Start: 2022-05-02 | End: 2022-08-19 | Stop reason: SDUPTHER

## 2022-05-02 RX ORDER — CLOPIDOGREL BISULFATE 75 MG/1
75 TABLET ORAL DAILY
Qty: 30 TABLET | Refills: 3 | Status: SHIPPED | OUTPATIENT
Start: 2022-05-02 | End: 2022-08-22 | Stop reason: SDUPTHER

## 2022-06-01 DIAGNOSIS — M79.89 LEG SWELLING: ICD-10-CM

## 2022-06-01 RX ORDER — FUROSEMIDE 20 MG/1
20 TABLET ORAL DAILY
Qty: 30 TABLET | Refills: 1 | Status: SHIPPED | OUTPATIENT
Start: 2022-06-01 | End: 2022-08-22 | Stop reason: SDUPTHER

## 2022-06-21 ENCOUNTER — OFFICE VISIT (OUTPATIENT)
Dept: FAMILY MEDICINE CLINIC | Age: 52
End: 2022-06-21

## 2022-06-21 VITALS
DIASTOLIC BLOOD PRESSURE: 80 MMHG | BODY MASS INDEX: 36.65 KG/M2 | SYSTOLIC BLOOD PRESSURE: 142 MMHG | OXYGEN SATURATION: 97 % | WEIGHT: 256 LBS | HEART RATE: 93 BPM | HEIGHT: 70 IN

## 2022-06-21 DIAGNOSIS — R03.0 ELEVATED BLOOD PRESSURE READING: ICD-10-CM

## 2022-06-21 DIAGNOSIS — E11.42 TYPE 2 DIABETES MELLITUS WITH DIABETIC POLYNEUROPATHY, WITHOUT LONG-TERM CURRENT USE OF INSULIN (HCC): Primary | ICD-10-CM

## 2022-06-21 LAB — HBA1C MFR BLD: 6.8 %

## 2022-06-21 PROCEDURE — 99213 OFFICE O/P EST LOW 20 MIN: CPT | Performed by: NURSE PRACTITIONER

## 2022-06-21 PROCEDURE — 3044F HG A1C LEVEL LT 7.0%: CPT | Performed by: NURSE PRACTITIONER

## 2022-06-21 PROCEDURE — 83036 HEMOGLOBIN GLYCOSYLATED A1C: CPT | Performed by: NURSE PRACTITIONER

## 2022-06-21 ASSESSMENT — ENCOUNTER SYMPTOMS
GASTROINTESTINAL NEGATIVE: 1
EYE DISCHARGE: 0
SHORTNESS OF BREATH: 0
CHEST TIGHTNESS: 0
EYE PAIN: 0

## 2022-06-21 ASSESSMENT — PATIENT HEALTH QUESTIONNAIRE - PHQ9
1. LITTLE INTEREST OR PLEASURE IN DOING THINGS: 0
2. FEELING DOWN, DEPRESSED OR HOPELESS: 0
SUM OF ALL RESPONSES TO PHQ9 QUESTIONS 1 & 2: 0
SUM OF ALL RESPONSES TO PHQ QUESTIONS 1-9: 0

## 2022-06-21 NOTE — PROGRESS NOTES
Subjective  Chief Complaint   Patient presents with    Diabetes     3 month f/u     Health Maintenance     pt declined colon cancer screen        HPI     Pt. States that he is generally doing well. Notes that his blood sugar readings have been within normal limits. Denies symptoms of hyperglycemia. Denies side effects from medications. Notes blurred vision and black spots for which he has made an appt with the eye doctor. Notes that he has no residuals from stroke.      Patient Active Problem List    Diagnosis Date Noted    Ataxia 02/09/2022    Cerebrovascular accident (CVA) due to stenosis of left carotid artery ([address]     Stenosis of left carotid artery     Aphasia     Neurofibroma     Acute [facility]A (cerebrovascular accident) ([facility] 12/31/2021    TIA (transient ischemic attack) 12/30/2021     Past Medical History:   Diagnosis Date    Hypertension     Type 2 diabetes mellitus ([facility]      Past Surgical History:   Procedure Laterality Date    VASCULAR SURGERY Right 1/4/2022    RIGHT CAROTID DIGITAL SUBTRACTION ARTERIOGRAM VIA RIGHT FEMORAL ARTERY performed by Mp Fuchs MD at [address] History   Problem Relation Age of Onset    Rheum Arthritis Mother     Osteoarthritis Mother     Hypertension Mother     Cancer Mother         uterine cancer    Other Father         myocardial infarction    Diabetes Father      Social History     Socioeconomic History    Marital status: Single     Spouse name: None    Number of children: None    Years of education: None    Highest education level: None   Occupational History    None   Tobacco Use    Smoking status: Never Smoker    Smokeless tobacco: Never Used   Substance and Sexual Activity    Alcohol use: No     Alcohol/week: 0.0 standard drinks    Drug use: No    Sexual activity: None   Other Topics Concern    None   Social History Narrative    None     Social Determinants of Health     Financial Resource Strain: Low Risk     Difficulty of 2 TABLETS BY MOUTH TWICE DAILY WITH MEALS. (GENERIC FOR GLUCOPHAGE) 360 tablet 1    fluticasone (FLONASE) 50 MCG/ACT nasal spray 2 sprays by Each Nostril route daily 1 Bottle 0     No current facility-administered medications on file prior to visit. Allergies   Allergen Reactions    Penicillins Hives and Swelling       Review of Systems   Constitutional: Negative for fatigue, fever and unexpected weight change. HENT: Negative. Eyes: Positive for visual disturbance (See HPI). Negative for pain and discharge. Respiratory: Negative for chest tightness and shortness of breath. Cardiovascular: Negative for chest pain and palpitations. Gastrointestinal: Negative. Endocrine: Negative for polydipsia, polyphagia and polyuria. Neurological: Negative for weakness and numbness. Objective  Vitals:    06/21/22 1350 06/21/22 1353   BP: (!) 154/84 (!) 142/80   Site: Left Upper Arm Right Upper Arm   Position: Sitting Sitting   Cuff Size: Medium Adult Medium Adult   Pulse: 93    SpO2: 97%    Weight: 256 lb (116.1 kg)    Height: 5' 9.5\" (1.765 m)      Physical Exam  Vitals and nursing note reviewed. Constitutional:       Appearance: Normal appearance. He is not ill-appearing. HENT:      Head: Normocephalic and atraumatic. Eyes:      Extraocular Movements: Extraocular movements intact. Conjunctiva/sclera: Conjunctivae normal.   Cardiovascular:      Rate and Rhythm: Normal rate and regular rhythm. Pulses: Normal pulses. Heart sounds: Normal heart sounds. Pulmonary:      Effort: Pulmonary effort is normal.      Breath sounds: Normal breath sounds. Musculoskeletal:         General: Normal range of motion. Skin:     General: Skin is warm and dry. Neurological:      General: No focal deficit present. Mental Status: He is alert and oriented to person, place, and time. Mental status is at baseline.    Psychiatric:         Mood and Affect: Mood normal.         Behavior: Behavior normal.         Thought Content: Thought content normal.       Assessment & Plan     Diagnosis Orders   1. Type 2 diabetes mellitus with diabetic polyneuropathy, without long-term current use of insulin (HCC)  POCT glycosylated hemoglobin (Hb A1C). Instructed pt. To continue taking medications as prescribed and monitoring blood glucose. Encouraged pt. To keep eye doctor appt. 2. Elevated blood pressure reading  Instructed patient to monitor BPs at home d/t elevated BP reading today in office. Educated on home monitoring. Orders Placed This Encounter   Procedures    POCT glycosylated hemoglobin (Hb A1C)     A1c at goal.    Side effects, adverse effects of the medication prescribed today, as well as treatment plan/ rationale and result expectations have been discussed with the patient who expresses understanding and desires to proceed.     Close follow up to evaluate treatment results and for coordination of care. I have reviewed the patient's medical history in detail and updated the computerized patient record.     As always, patient is advised that if symptoms worsen in any way they will proceed to the nearest emergency room. F/U in 1 month.      SALTY Newman - CNP

## 2022-07-12 DIAGNOSIS — E11.42 TYPE 2 DIABETES MELLITUS WITH DIABETIC POLYNEUROPATHY, WITHOUT LONG-TERM CURRENT USE OF INSULIN (HCC): ICD-10-CM

## 2022-07-12 NOTE — TELEPHONE ENCOUNTER
currently have any appointments scheduled.     Past Visits    Date Provider Specialty Visit Type Primary Dx   06/21/2022 SALTY Naranjo CNP Family Medicine Office Visit Type 2 diabetes mellitus with diabetic polyneuropathy, without long-term current use of insulin ([facility]   03/24/2022  Family Medicine Nurse Only Type 2 diabetes mellitus with diabetic polyneuropathy, without long-term current use of insulin ([address]   03/21/2022 SALTY Naranjo CNP Family Medicine Telemedicine Type 2 diabetes mellitus with diabetic polyneuropathy, without long-term current use of insulin ([address]   02/09/2022 Debbie Cameron MD Neurology Office Visit Cerebrovascular accident (CVA) due to stenosis of left carotid artery ([facility]   01/11/2022 SALTY Naranjo CNP Family Medicine Virtual Visit Cerebrovascular accident (CVA) due to stenosis of left carotid

## 2022-07-13 DIAGNOSIS — E11.42 TYPE 2 DIABETES MELLITUS WITH DIABETIC POLYNEUROPATHY, WITHOUT LONG-TERM CURRENT USE OF INSULIN (HCC): ICD-10-CM

## 2022-08-19 DIAGNOSIS — E11.42 TYPE 2 DIABETES MELLITUS WITH DIABETIC POLYNEUROPATHY, WITHOUT LONG-TERM CURRENT USE OF INSULIN (HCC): ICD-10-CM

## 2022-08-19 RX ORDER — GLIPIZIDE 10 MG/1
10 TABLET ORAL
Qty: 180 TABLET | Refills: 1 | Status: SHIPPED | OUTPATIENT
Start: 2022-08-19

## 2022-08-22 DIAGNOSIS — M79.89 LEG SWELLING: ICD-10-CM

## 2022-08-22 DIAGNOSIS — I63.9 ACUTE CVA (CEREBROVASCULAR ACCIDENT) (HCC): ICD-10-CM

## 2022-08-22 DIAGNOSIS — I10 ESSENTIAL HYPERTENSION: ICD-10-CM

## 2022-08-22 DIAGNOSIS — E11.42 TYPE 2 DIABETES MELLITUS WITH DIABETIC POLYNEUROPATHY, WITHOUT LONG-TERM CURRENT USE OF INSULIN (HCC): ICD-10-CM

## 2022-08-22 RX ORDER — FUROSEMIDE 20 MG/1
20 TABLET ORAL DAILY
Qty: 90 TABLET | Refills: 1 | Status: SHIPPED | OUTPATIENT
Start: 2022-08-22

## 2022-08-22 RX ORDER — CLOPIDOGREL BISULFATE 75 MG/1
75 TABLET ORAL DAILY
Qty: 90 TABLET | Refills: 1 | Status: SHIPPED | OUTPATIENT
Start: 2022-08-22

## 2022-08-22 RX ORDER — ATORVASTATIN CALCIUM 40 MG/1
40 TABLET, FILM COATED ORAL NIGHTLY
Qty: 90 TABLET | Refills: 1 | Status: SHIPPED | OUTPATIENT
Start: 2022-08-22

## 2022-08-22 RX ORDER — PIOGLITAZONEHYDROCHLORIDE 30 MG/1
30 TABLET ORAL DAILY
Qty: 90 TABLET | Refills: 1 | Status: SHIPPED | OUTPATIENT
Start: 2022-08-22

## 2022-08-22 RX ORDER — LISINOPRIL 20 MG/1
TABLET ORAL
Qty: 90 TABLET | Refills: 1 | Status: SHIPPED | OUTPATIENT
Start: 2022-08-22 | End: 2022-10-10 | Stop reason: SDUPTHER

## 2022-08-22 RX ORDER — ASPIRIN 81 MG/1
81 TABLET ORAL DAILY
Qty: 90 TABLET | Refills: 1 | Status: SHIPPED | OUTPATIENT
Start: 2022-08-22

## 2022-09-05 ENCOUNTER — NURSE TRIAGE (OUTPATIENT)
Dept: OTHER | Facility: CLINIC | Age: 52
End: 2022-09-05

## 2022-09-06 ENCOUNTER — TELEPHONE (OUTPATIENT)
Dept: FAMILY MEDICINE CLINIC | Age: 52
End: 2022-09-06

## 2022-09-06 NOTE — TELEPHONE ENCOUNTER
Patient called at 8:45 PM 9/5/2022  Patient concerned because his blood sugar running low. States he was having lightheadedness in the afternoon and checked his blood sugar. It was 86 at that time. He had a snack and rechecked an hour later and blood sugar still in the 80s. States his sugar usually runs in the 120s. Besides lightheadedness overall feeling fine. Patient ate like normal throughout the day. Patient takes metformin and glipizide and is not sure if he should take these medications tonight. Recommended patient not take glipizide evening dose. Continue to check sugars and follow-up with PCP if continued to have low sugars.

## 2022-10-10 DIAGNOSIS — I10 ESSENTIAL HYPERTENSION: ICD-10-CM

## 2022-10-10 DIAGNOSIS — E11.42 TYPE 2 DIABETES MELLITUS WITH DIABETIC POLYNEUROPATHY, WITHOUT LONG-TERM CURRENT USE OF INSULIN (HCC): ICD-10-CM

## 2022-10-10 RX ORDER — LISINOPRIL 20 MG/1
TABLET ORAL
Qty: 90 TABLET | Refills: 1 | Status: SHIPPED | OUTPATIENT
Start: 2022-10-10

## 2023-01-12 ENCOUNTER — OFFICE VISIT (OUTPATIENT)
Dept: FAMILY MEDICINE CLINIC | Age: 53
End: 2023-01-12

## 2023-01-12 VITALS
DIASTOLIC BLOOD PRESSURE: 90 MMHG | WEIGHT: 273 LBS | OXYGEN SATURATION: 97 % | BODY MASS INDEX: 39.08 KG/M2 | HEIGHT: 70 IN | HEART RATE: 82 BPM | SYSTOLIC BLOOD PRESSURE: 162 MMHG

## 2023-01-12 DIAGNOSIS — E11.42 TYPE 2 DIABETES MELLITUS WITH DIABETIC POLYNEUROPATHY, WITHOUT LONG-TERM CURRENT USE OF INSULIN (HCC): ICD-10-CM

## 2023-01-12 DIAGNOSIS — I63.9 ACUTE CVA (CEREBROVASCULAR ACCIDENT) (HCC): ICD-10-CM

## 2023-01-12 DIAGNOSIS — M79.89 LEG SWELLING: ICD-10-CM

## 2023-01-12 DIAGNOSIS — I10 ESSENTIAL HYPERTENSION: ICD-10-CM

## 2023-01-12 PROCEDURE — 3077F SYST BP >= 140 MM HG: CPT | Performed by: NURSE PRACTITIONER

## 2023-01-12 PROCEDURE — 99213 OFFICE O/P EST LOW 20 MIN: CPT | Performed by: NURSE PRACTITIONER

## 2023-01-12 PROCEDURE — 3079F DIAST BP 80-89 MM HG: CPT | Performed by: NURSE PRACTITIONER

## 2023-01-12 RX ORDER — LISINOPRIL 40 MG/1
TABLET ORAL
Qty: 90 TABLET | Refills: 1 | Status: SHIPPED | OUTPATIENT
Start: 2023-01-12

## 2023-01-12 RX ORDER — PIOGLITAZONEHYDROCHLORIDE 30 MG/1
30 TABLET ORAL DAILY
Qty: 90 TABLET | Refills: 1 | Status: SHIPPED | OUTPATIENT
Start: 2023-01-12

## 2023-01-12 RX ORDER — ATORVASTATIN CALCIUM 40 MG/1
40 TABLET, FILM COATED ORAL NIGHTLY
Qty: 90 TABLET | Refills: 1 | Status: SHIPPED | OUTPATIENT
Start: 2023-01-12

## 2023-01-12 RX ORDER — ASPIRIN 81 MG/1
81 TABLET ORAL DAILY
Qty: 90 TABLET | Refills: 1 | Status: SHIPPED | OUTPATIENT
Start: 2023-01-12

## 2023-01-12 RX ORDER — GABAPENTIN 100 MG/1
100 CAPSULE ORAL 3 TIMES DAILY
Qty: 90 CAPSULE | Refills: 1 | Status: SHIPPED | OUTPATIENT
Start: 2023-01-12 | End: 2023-03-13

## 2023-01-12 RX ORDER — CLOPIDOGREL BISULFATE 75 MG/1
75 TABLET ORAL DAILY
Qty: 90 TABLET | Refills: 1 | Status: SHIPPED | OUTPATIENT
Start: 2023-01-12

## 2023-01-12 RX ORDER — GLIPIZIDE 10 MG/1
10 TABLET ORAL
Qty: 180 TABLET | Refills: 1 | Status: SHIPPED | OUTPATIENT
Start: 2023-01-12

## 2023-01-12 RX ORDER — FUROSEMIDE 20 MG/1
20 TABLET ORAL DAILY
Qty: 90 TABLET | Refills: 1 | Status: SHIPPED | OUTPATIENT
Start: 2023-01-12

## 2023-01-12 SDOH — ECONOMIC STABILITY: FOOD INSECURITY: WITHIN THE PAST 12 MONTHS, THE FOOD YOU BOUGHT JUST DIDN'T LAST AND YOU DIDN'T HAVE MONEY TO GET MORE.: NEVER TRUE

## 2023-01-12 SDOH — ECONOMIC STABILITY: FOOD INSECURITY: WITHIN THE PAST 12 MONTHS, YOU WORRIED THAT YOUR FOOD WOULD RUN OUT BEFORE YOU GOT MONEY TO BUY MORE.: NEVER TRUE

## 2023-01-12 ASSESSMENT — PATIENT HEALTH QUESTIONNAIRE - PHQ9
1. LITTLE INTEREST OR PLEASURE IN DOING THINGS: 0
SUM OF ALL RESPONSES TO PHQ QUESTIONS 1-9: 0
2. FEELING DOWN, DEPRESSED OR HOPELESS: 0
SUM OF ALL RESPONSES TO PHQ QUESTIONS 1-9: 0
SUM OF ALL RESPONSES TO PHQ9 QUESTIONS 1 & 2: 0

## 2023-01-12 ASSESSMENT — ENCOUNTER SYMPTOMS
SHORTNESS OF BREATH: 0
CONSTIPATION: 0
DIARRHEA: 0
COUGH: 0

## 2023-01-12 ASSESSMENT — SOCIAL DETERMINANTS OF HEALTH (SDOH): HOW HARD IS IT FOR YOU TO PAY FOR THE VERY BASICS LIKE FOOD, HOUSING, MEDICAL CARE, AND HEATING?: NOT HARD AT ALL

## 2023-01-12 NOTE — PROGRESS NOTES
Subjective  Chief Complaint   Patient presents with    Neurologic Problem     Pt states concern with neuropathy         HPI    Having neuropathy in right foot. Numbness and tingling present. On toes and ball of foot. Seems to be getting worse. Would like something to help with it    Saw eye doctor. Was told he has \"bleeding behind the eyes\"     Blood sugar has been well controlled. Has not monitored bp on his own at all.       Patient Active Problem List    Diagnosis Date Noted    Ataxia 02/09/2022    Cerebrovascular accident (CVA) due to stenosis of left carotid artery ([facility]     Stenosis of left carotid artery     Aphasia     Neurofibroma     Acute CVA (cerebrovascular accident) ([facility] [address] 12/31/2021    TIA (transient ischemic attack) 12/30/2021     Past Medical History:   Diagnosis Date    Hypertension     Type 2 diabetes mellitus ([facility] [address]      Past Surgical History:   Procedure Laterality Date    VASCULAR SURGERY Right 1/4/2022    RIGHT CAROTID DIGITAL SUBTRACTION ARTERIOGRAM VIA RIGHT FEMORAL ARTERY performed by Khari Asher MD at [address] History   Problem Relation Age of Onset    Rheum Arthritis Mother     Osteoarthritis Mother     Hypertension Mother     Cancer Mother         uterine cancer    Other Father         myocardial infarction    Diabetes Father      Social History     Socioeconomic History    Marital status: Single     Spouse name: None    Number of children: None    Years of education: None    Highest education level: None   Tobacco Use    Smoking status: Never    Smokeless tobacco: Never   Substance and Sexual Activity    Alcohol use: No     Alcohol/week: 0.0 standard drinks    Drug use: No     Social Determinants of Health     Financial Resource Strain: Low Risk     Difficulty of Paying Living Expenses: Not hard at all   Food Insecurity: No Food Insecurity    Worried About Running Out of Food in the Last Year: Never true    Tai of Food in the Last Year: Never true     Current Outpatient Medications on File Prior to Visit   Medication Sig Dispense Refill    metFORMIN (GLUCOPHAGE) 500 MG tablet TAKE TWO TABLETS BY MOUTH TWO TIMES A DAY WITH MEALS 360 tablet 1    fluticasone (FLONASE) 50 MCG/ACT nasal spray 2 sprays by Each Nostril route daily 1 Bottle 0     No current facility-administered medications on file prior to visit. Allergies   Allergen Reactions    Penicillins Hives and Swelling       Review of Systems   Constitutional:  Negative for fatigue. Respiratory:  Negative for cough and shortness of breath. Cardiovascular:  Negative for chest pain. Gastrointestinal:  Negative for constipation and diarrhea. Neurological:  Positive for numbness. Objective  Vitals:    01/12/23 0938 01/12/23 0942 01/12/23 0957   BP: (!) 180/84 (!) 154/86 (!) 162/90   Site: Left Upper Arm Right Upper Arm    Position: Sitting Sitting    Cuff Size: Medium Adult Medium Adult    Pulse: 82     SpO2: 97%     Weight: 273 lb (123.8 kg)     Height: 5' 10\" (1.778 m)       Physical Exam  Vitals and nursing note reviewed. Constitutional:       Appearance: Normal appearance. HENT:      Head: Normocephalic. Nose: Nose normal.      Mouth/Throat:      Mouth: Mucous membranes are moist.      Pharynx: Oropharynx is clear. Eyes:      Extraocular Movements: Extraocular movements intact. Conjunctiva/sclera: Conjunctivae normal.      Pupils: Pupils are equal, round, and reactive to light. Cardiovascular:      Rate and Rhythm: Normal rate and regular rhythm. Pulses: Normal pulses. Heart sounds: Normal heart sounds. Pulmonary:      Effort: Pulmonary effort is normal.      Breath sounds: Normal breath sounds. Musculoskeletal:      Cervical back: Neck supple. Legs:    Skin:     General: Skin is warm. Neurological:      General: No focal deficit present. Mental Status: He is alert and oriented to person, place, and time.  Mental status is at baseline. Psychiatric:         Mood and Affect: Mood normal.         Behavior: Behavior normal.         Thought Content: Thought content normal.         Judgment: Judgment normal.       Assessment & Plan     Diagnosis Orders   1. Acute CVA (cerebrovascular accident) ([facility] [facility]  aspirin 81 MG EC tablet    atorvastatin (LIPITOR) 40 MG tablet    clopidogrel (PLAVIX) 75 MG tablet      2. Leg swelling  furosemide (LASIX) 20 MG tablet      3. Type 2 diabetes mellitus with diabetic polyneuropathy, without long-term current use of insulin ([facility])  glipiZIDE (GLUCOTROL) 10 MG tablet    pioglitazone (ACTOS) 30 MG tablet    gabapentin (NEURONTIN) 100 MG capsule      4. Essential hypertension  lisinopril (PRINIVIL;ZESTRIL) 40 MG tablet          No orders of the defined types were placed in this encounter. Orders Placed This Encounter   Medications    aspirin 81 MG EC tablet     Sig: Take 1 tablet by mouth daily     Dispense:  90 tablet     Refill:  1    atorvastatin (LIPITOR) 40 MG tablet     Sig: Take 1 tablet by mouth nightly     Dispense:  90 tablet     Refill:  1    clopidogrel (PLAVIX) 75 MG tablet     Sig: Take 1 tablet by mouth daily     Dispense:  90 tablet     Refill:  1    furosemide (LASIX) 20 MG tablet     Sig: Take 1 tablet by mouth daily     Dispense:  90 tablet     Refill:  1    glipiZIDE (GLUCOTROL) 10 MG tablet     Sig: Take 1 tablet by mouth 2 times daily (before meals)     Dispense:  180 tablet     Refill:  1    pioglitazone (ACTOS) 30 MG tablet     Sig: Take 1 tablet by mouth daily     Dispense:  90 tablet     Refill:  1    gabapentin (NEURONTIN) 100 MG capsule     Sig: Take 1 capsule by mouth 3 times daily for 60 days. Intended supply: 30 days     Dispense:  90 capsule     Refill:  1    lisinopril (PRINIVIL;ZESTRIL) 40 MG tablet     Sig: TAKE 1 TABLET BY MOUTH DAILY.  (GENERIC FOR PRINIVIL)     Dispense:  90 tablet     Refill:  1         Medications Discontinued During This Encounter   Medication Reason    glipiZIDE (GLUCOTROL) 10 MG tablet REORDER    aspirin 81 MG EC tablet REORDER    pioglitazone (ACTOS) 30 MG tablet REORDER    clopidogrel (PLAVIX) 75 MG tablet REORDER    atorvastatin (LIPITOR) 40 MG tablet REORDER    furosemide (LASIX) 20 MG tablet REORDER    lisinopril (PRINIVIL;ZESTRIL) 20 MG tablet REORDER      Side effects, adverse effects of the medication prescribed today, as well as treatment plan/ rationale and result expectations have been discussed with the patient who expresses understanding and desires to proceed. Close follow up to evaluate treatment results and for coordination of care. I have reviewed the patient's medical history in detail and updated the computerized patient record. As always, patient is advised that if symptoms worsen in any way they will proceed to the nearest emergency room. Fu in 3-4 weeks.        SALTY Bradford - CNP

## 2023-02-08 SDOH — ECONOMIC STABILITY: FOOD INSECURITY: WITHIN THE PAST 12 MONTHS, YOU WORRIED THAT YOUR FOOD WOULD RUN OUT BEFORE YOU GOT MONEY TO BUY MORE.: NEVER TRUE

## 2023-02-08 SDOH — ECONOMIC STABILITY: FOOD INSECURITY: WITHIN THE PAST 12 MONTHS, THE FOOD YOU BOUGHT JUST DIDN'T LAST AND YOU DIDN'T HAVE MONEY TO GET MORE.: NEVER TRUE

## 2023-02-08 SDOH — ECONOMIC STABILITY: HOUSING INSECURITY
IN THE LAST 12 MONTHS, WAS THERE A TIME WHEN YOU DID NOT HAVE A STEADY PLACE TO SLEEP OR SLEPT IN A SHELTER (INCLUDING NOW)?: NO

## 2023-02-08 SDOH — ECONOMIC STABILITY: INCOME INSECURITY: HOW HARD IS IT FOR YOU TO PAY FOR THE VERY BASICS LIKE FOOD, HOUSING, MEDICAL CARE, AND HEATING?: NOT HARD AT ALL

## 2023-02-09 ENCOUNTER — OFFICE VISIT (OUTPATIENT)
Dept: FAMILY MEDICINE CLINIC | Age: 53
End: 2023-02-09

## 2023-02-09 VITALS
SYSTOLIC BLOOD PRESSURE: 152 MMHG | HEIGHT: 71 IN | BODY MASS INDEX: 37.8 KG/M2 | HEART RATE: 96 BPM | DIASTOLIC BLOOD PRESSURE: 84 MMHG | WEIGHT: 270 LBS | OXYGEN SATURATION: 98 %

## 2023-02-09 DIAGNOSIS — I10 ESSENTIAL HYPERTENSION: ICD-10-CM

## 2023-02-09 DIAGNOSIS — E11.42 TYPE 2 DIABETES MELLITUS WITH DIABETIC POLYNEUROPATHY, WITHOUT LONG-TERM CURRENT USE OF INSULIN (HCC): ICD-10-CM

## 2023-02-09 DIAGNOSIS — I63.232 CEREBROVASCULAR ACCIDENT (CVA) DUE TO STENOSIS OF LEFT CAROTID ARTERY (HCC): Primary | ICD-10-CM

## 2023-02-09 DIAGNOSIS — I65.22 STENOSIS OF LEFT CAROTID ARTERY: ICD-10-CM

## 2023-02-09 PROCEDURE — 3077F SYST BP >= 140 MM HG: CPT | Performed by: NURSE PRACTITIONER

## 2023-02-09 PROCEDURE — 99213 OFFICE O/P EST LOW 20 MIN: CPT | Performed by: NURSE PRACTITIONER

## 2023-02-09 PROCEDURE — 3079F DIAST BP 80-89 MM HG: CPT | Performed by: NURSE PRACTITIONER

## 2023-02-09 RX ORDER — ROSUVASTATIN CALCIUM 20 MG/1
20 TABLET, COATED ORAL DAILY
Qty: 30 TABLET | Refills: 5 | Status: SHIPPED | OUTPATIENT
Start: 2023-02-09

## 2023-02-09 ASSESSMENT — ENCOUNTER SYMPTOMS
SHORTNESS OF BREATH: 0
COUGH: 0

## 2023-02-09 NOTE — PROGRESS NOTES
Subjective  Chief Complaint   Patient presents with    Diabetes       HPI    Neuropathy- doing better with gabapentin. Has been taking it twice a day. Hasn't tried it a third time yet. HTN- Bp has been well controlled at home 130's/70's  Denies any symptoms of high BP. Taking medication daily. BS has been well controlled. Has had some joint and muscle aches since started on medication from the hospital initially. Asking if there one that could be causing it?     Patient Active Problem List    Diagnosis Date Noted    Ataxia 02/09/2022    Cerebrovascular accident (CVA) due to stenosis of left carotid artery ([address]     Stenosis of left carotid artery     Aphasia     Neurofibroma     Acute CVA (cerebrovascular accident) ([facility] [address] 12/31/2021    TIA (transient ischemic attack) 12/30/2021     Past Medical History:   Diagnosis Date    Hypertension     Type 2 diabetes mellitus ([facility] [address]      Past Surgical History:   Procedure Laterality Date    VASCULAR SURGERY Right 1/4/2022    RIGHT CAROTID DIGITAL SUBTRACTION ARTERIOGRAM VIA RIGHT FEMORAL ARTERY performed by Odell Rodney MD at [address] History   Problem Relation Age of Onset    Rheum Arthritis Mother     Osteoarthritis Mother     Hypertension Mother     Cancer Mother         uterine cancer    Other Father         myocardial infarction    Diabetes Father      Social History     Socioeconomic History    Marital status: Single     Spouse name: None    Number of children: None    Years of education: None    Highest education level: None   Tobacco Use    Smoking status: Never    Smokeless tobacco: Never   Substance and Sexual Activity    Alcohol use: No     Alcohol/week: 0.0 standard drinks    Drug use: No     Social Determinants of Health     Financial Resource Strain: Low Risk     Difficulty of Paying Living Expenses: Not hard at all   Food Insecurity: No Food Insecurity    Worried About Running Out of Food in the Last Year: Never true    Ran Out of Food in the Last Year: Never true     Current Outpatient Medications on File Prior to Visit   Medication Sig Dispense Refill    aspirin 81 MG EC tablet Take 1 tablet by mouth daily 90 tablet 1    atorvastatin (LIPITOR) 40 MG tablet Take 1 tablet by mouth nightly 90 tablet 1    clopidogrel (PLAVIX) 75 MG tablet Take 1 tablet by mouth daily 90 tablet 1    furosemide (LASIX) 20 MG tablet Take 1 tablet by mouth daily 90 tablet 1    glipiZIDE (GLUCOTROL) 10 MG tablet Take 1 tablet by mouth 2 times daily (before meals) 180 tablet 1    pioglitazone (ACTOS) 30 MG tablet Take 1 tablet by mouth daily 90 tablet 1    gabapentin (NEURONTIN) 100 MG capsule Take 1 capsule by mouth 3 times daily for 60 days. Intended supply: 30 days 90 capsule 1    lisinopril (PRINIVIL;ZESTRIL) 40 MG tablet TAKE 1 TABLET BY MOUTH DAILY. (GENERIC FOR PRINIVIL) 90 tablet 1    metFORMIN (GLUCOPHAGE) 500 MG tablet TAKE TWO TABLETS BY MOUTH TWO TIMES A DAY WITH MEALS 360 tablet 1    fluticasone (FLONASE) 50 MCG/ACT nasal spray 2 sprays by Each Nostril route daily 1 Bottle 0     No current facility-administered medications on file prior to visit. Allergies   Allergen Reactions    Penicillins Hives and Swelling       Review of Systems   Constitutional:  Negative for fatigue. Respiratory:  Negative for cough and shortness of breath. Cardiovascular:  Negative for chest pain. Musculoskeletal:  Positive for arthralgias and myalgias. Neurological:  Negative for headaches. Objective  Vitals:    02/09/23 1400 02/09/23 1402   BP: (!) 174/86 (!) 152/84   Site: Left Upper Arm Right Upper Arm   Position: Sitting Sitting   Cuff Size: Medium Adult Medium Adult   Pulse: 96    SpO2: 98%    Weight: 270 lb (122.5 kg)    Height: 5' 10.5\" (1.791 m)      Physical Exam  Vitals and nursing note reviewed. Constitutional:       Appearance: Normal appearance. HENT:      Head: Normocephalic.       Nose: Nose normal.      Mouth/Throat:      Mouth: Mucous membranes are moist.      Pharynx: Oropharynx is clear. Eyes:      Extraocular Movements: Extraocular movements intact. Conjunctiva/sclera: Conjunctivae normal.      Pupils: Pupils are equal, round, and reactive to light. Cardiovascular:      Rate and Rhythm: Normal rate and regular rhythm. Pulses: Normal pulses. Heart sounds: Normal heart sounds. Pulmonary:      Effort: Pulmonary effort is normal.      Breath sounds: Normal breath sounds. Musculoskeletal:      Cervical back: Neck supple. Skin:     General: Skin is warm. Neurological:      General: No focal deficit present. Mental Status: He is alert and oriented to person, place, and time. Mental status is at baseline. Psychiatric:         Mood and Affect: Mood normal.         Behavior: Behavior normal.         Thought Content: Thought content normal.         Judgment: Judgment normal.       Assessment & Plan     Diagnosis Orders   1. Cerebrovascular accident (CVA) due to stenosis of left carotid artery (HCC)  rosuvastatin (CRESTOR) 20 MG tablet  D/c lipitor      2. Stenosis of left carotid artery  rosuvastatin (CRESTOR) 20 MG tablet      3. Type 2 diabetes mellitus with diabetic polyneuropathy, without long-term current use of insulin (HCC)  Improving BS. Cont with same treatment      4. Essential hypertension  Pt is going to stop by over next two weeks for BP check ins. If still high will start additional medications          Side effects, adverse effects of the medication prescribed today, as well as treatment plan/ rationale and result expectations have been discussed with the patient who expresses understanding and desires to proceed. Close follow up to evaluate treatment results and for coordination of care. I have reviewed the patient's medical history in detail and updated the computerized patient record. As always, patient is advised that if symptoms worsen in any way they will proceed to the nearest emergency room.        No orders of the defined types were placed in this encounter.       Orders Placed This Encounter   Medications    rosuvastatin (CRESTOR) 20 MG tablet     Sig: Take 1 tablet by mouth daily     Dispense:  30 tablet     Refill:  [id number] [place] [address] Dg Dumont APRN - CNP

## 2023-03-02 DIAGNOSIS — I63.9 ACUTE CVA (CEREBROVASCULAR ACCIDENT) (HCC): ICD-10-CM

## 2023-03-02 DIAGNOSIS — E11.42 TYPE 2 DIABETES MELLITUS WITH DIABETIC POLYNEUROPATHY, WITHOUT LONG-TERM CURRENT USE OF INSULIN (HCC): ICD-10-CM

## 2023-03-02 RX ORDER — PIOGLITAZONEHYDROCHLORIDE 30 MG/1
TABLET ORAL
Qty: 90 TABLET | Refills: 0 | OUTPATIENT
Start: 2023-03-02

## 2023-03-02 RX ORDER — ASPIRIN 81 MG/1
TABLET, COATED ORAL
Qty: 90 TABLET | Refills: 0 | OUTPATIENT
Start: 2023-03-02

## 2023-03-02 RX ORDER — CLOPIDOGREL BISULFATE 75 MG/1
TABLET ORAL
Qty: 90 TABLET | Refills: 0 | OUTPATIENT
Start: 2023-03-02

## 2023-03-03 RX ORDER — CLOPIDOGREL BISULFATE 75 MG/1
75 TABLET ORAL DAILY
Qty: 90 TABLET | Refills: 1 | Status: SHIPPED | OUTPATIENT
Start: 2023-03-03

## 2023-03-03 RX ORDER — ASPIRIN 81 MG/1
81 TABLET ORAL DAILY
Qty: 90 TABLET | Refills: 1 | Status: SHIPPED | OUTPATIENT
Start: 2023-03-03

## 2023-03-03 RX ORDER — PIOGLITAZONEHYDROCHLORIDE 30 MG/1
30 TABLET ORAL DAILY
Qty: 90 TABLET | Refills: 1 | Status: SHIPPED | OUTPATIENT
Start: 2023-03-03

## 2023-03-13 DIAGNOSIS — E11.42 TYPE 2 DIABETES MELLITUS WITH DIABETIC POLYNEUROPATHY, WITHOUT LONG-TERM CURRENT USE OF INSULIN (HCC): ICD-10-CM

## 2023-03-13 RX ORDER — GLIPIZIDE 10 MG/1
10 TABLET ORAL
Qty: 180 TABLET | Refills: 1 | Status: SHIPPED | OUTPATIENT
Start: 2023-03-13

## 2023-03-13 NOTE — TELEPHONE ENCOUNTER
Comments:     Last Office Visit (last PCP visit):   2/9/2023    Next Visit Date:  Future Appointments   Date Time Provider Cas Melendez   5/9/2023  1:45 PM SALTY Bradford CNP [facility] AT [place]       **If hasn't been seen in over a year OR hasn't followed up according to last diabetes/ADHD visit, make appointment for patient before sending refill to provider.     Rx requested:  Requested Prescriptions     Pending Prescriptions Disp Refills    glipiZIDE (GLUCOTROL) 10 MG tablet 180 tablet 1     Sig: Take 1 tablet by mouth 2 times daily (before meals)

## 2023-03-27 DIAGNOSIS — E11.42 TYPE 2 DIABETES MELLITUS WITH DIABETIC POLYNEUROPATHY, WITHOUT LONG-TERM CURRENT USE OF INSULIN (HCC): ICD-10-CM

## 2023-03-27 RX ORDER — GABAPENTIN 100 MG/1
100 CAPSULE ORAL 3 TIMES DAILY
Qty: 90 CAPSULE | Refills: 2 | Status: SHIPPED | OUTPATIENT
Start: 2023-03-27 | End: 2023-06-25

## 2023-04-25 ENCOUNTER — TELEPHONE (OUTPATIENT)
Dept: GASTROENTEROLOGY | Age: 53
End: 2023-04-25

## 2023-05-25 ENCOUNTER — OFFICE VISIT (OUTPATIENT)
Dept: FAMILY MEDICINE CLINIC | Age: 53
End: 2023-05-25

## 2023-05-25 VITALS
OXYGEN SATURATION: 96 % | BODY MASS INDEX: 41.18 KG/M2 | HEART RATE: 91 BPM | DIASTOLIC BLOOD PRESSURE: 90 MMHG | HEIGHT: 69 IN | SYSTOLIC BLOOD PRESSURE: 190 MMHG | WEIGHT: 278 LBS

## 2023-05-25 DIAGNOSIS — I10 PRIMARY HYPERTENSION: Primary | ICD-10-CM

## 2023-05-25 DIAGNOSIS — E11.42 TYPE 2 DIABETES MELLITUS WITH DIABETIC POLYNEUROPATHY, WITHOUT LONG-TERM CURRENT USE OF INSULIN (HCC): ICD-10-CM

## 2023-05-25 PROCEDURE — 99213 OFFICE O/P EST LOW 20 MIN: CPT | Performed by: NURSE PRACTITIONER

## 2023-05-25 PROCEDURE — 3051F HG A1C>EQUAL 7.0%<8.0%: CPT | Performed by: NURSE PRACTITIONER

## 2023-05-25 PROCEDURE — 3077F SYST BP >= 140 MM HG: CPT | Performed by: NURSE PRACTITIONER

## 2023-05-25 PROCEDURE — 3080F DIAST BP >= 90 MM HG: CPT | Performed by: NURSE PRACTITIONER

## 2023-05-25 RX ORDER — PRAVASTATIN SODIUM 20 MG
20 TABLET ORAL DAILY
Qty: 30 TABLET | Refills: 5 | Status: SHIPPED | OUTPATIENT
Start: 2023-05-25

## 2023-05-25 RX ORDER — AMLODIPINE BESYLATE 5 MG/1
5 TABLET ORAL DAILY
Qty: 30 TABLET | Refills: 5 | Status: SHIPPED | OUTPATIENT
Start: 2023-05-25

## 2023-05-25 ASSESSMENT — ENCOUNTER SYMPTOMS
SHORTNESS OF BREATH: 0
COUGH: 0

## 2023-05-25 NOTE — PROGRESS NOTES
Subjective  Chief Complaint   Patient presents with    Diabetes     Check up   Pt states he would like to discuss crestor. HPI      Lab Results   Component Value Date    WBC 9.6 01/05/2022    HGB 13.0 (L) 01/05/2022    HCT 38.0 (L) 01/05/2022     01/05/2022    CHOL 182 05/13/2023    TRIG 77 05/13/2023    HDL 47 05/13/2023    ALT 19 05/13/2023    AST 16 05/13/2023     05/13/2023    K 4.8 05/13/2023     05/13/2023    CREATININE 0.61 (L) 05/13/2023    BUN 24 (H) 05/13/2023    CO2 26 05/13/2023    TSH 1.400 05/13/2023    PSA 0.89 05/13/2023    INR 1.0 12/30/2021    LABA1C 7.0 (H) 05/13/2023     Pt's recent labs showed controlled DM  Overall he has felt well with the exception of leg pain associated with his statin. BP has been up recently. Does not know any current triggers. Denies changes to diet/exercise.        Patient Active Problem List    Diagnosis Date Noted    Ataxia 02/09/2022    Cerebrovascular accident (CVA) due to stenosis of left carotid artery ([address]     Stenosis of left carotid artery     Aphasia     Neurofibroma     Acute CVA (cerebrovascular accident) ([address] 12/31/2021    TIA (transient ischemic attack) 12/30/2021     Past Medical History:   Diagnosis Date    Cerebrovascular disease 12/29/21    Hypertension     Type 2 diabetes mellitus ([address]      Past Surgical History:   Procedure Laterality Date    VASCULAR SURGERY Right 1/4/2022    RIGHT CAROTID DIGITAL SUBTRACTION ARTERIOGRAM VIA RIGHT FEMORAL ARTERY performed by Douglas Terrazas MD at [facility]     Family History   Problem Relation Age of Onset    Rheum Arthritis Mother     Osteoarthritis Mother     Hypertension Mother     Cancer Mother         uterine cancer    Other Father         myocardial infarction    Diabetes Father      Social History     Socioeconomic History    Marital status: Single     Spouse name: None    Number of children: None    Years of education: None    Highest education level: None   Tobacco Use

## 2023-06-08 ENCOUNTER — OFFICE VISIT (OUTPATIENT)
Dept: FAMILY MEDICINE CLINIC | Age: 53
End: 2023-06-08

## 2023-06-08 VITALS
SYSTOLIC BLOOD PRESSURE: 162 MMHG | BODY MASS INDEX: 38.78 KG/M2 | HEART RATE: 87 BPM | HEIGHT: 71 IN | DIASTOLIC BLOOD PRESSURE: 90 MMHG | OXYGEN SATURATION: 98 % | WEIGHT: 277 LBS

## 2023-06-08 DIAGNOSIS — I10 PRIMARY HYPERTENSION: Primary | ICD-10-CM

## 2023-06-08 PROCEDURE — 3079F DIAST BP 80-89 MM HG: CPT | Performed by: NURSE PRACTITIONER

## 2023-06-08 PROCEDURE — 99213 OFFICE O/P EST LOW 20 MIN: CPT | Performed by: NURSE PRACTITIONER

## 2023-06-08 PROCEDURE — 3077F SYST BP >= 140 MM HG: CPT | Performed by: NURSE PRACTITIONER

## 2023-06-08 RX ORDER — AMLODIPINE BESYLATE 10 MG/1
10 TABLET ORAL DAILY
Qty: 30 TABLET | Refills: 5 | Status: SHIPPED | OUTPATIENT
Start: 2023-06-08

## 2023-06-08 ASSESSMENT — ENCOUNTER SYMPTOMS
COUGH: 0
CONSTIPATION: 0
DIARRHEA: 0
SHORTNESS OF BREATH: 0

## 2023-06-08 NOTE — PROGRESS NOTES
pravastatin (PRAVACHOL) 20 MG tablet Take 1 tablet by mouth daily 30 tablet 5    metFORMIN (GLUCOPHAGE) 500 MG tablet TAKE TWO TABLETS BY MOUTH TWO TIMES A DAY WITH MEALS 360 tablet 1    lisinopril (PRINIVIL;ZESTRIL) 40 MG tablet TAKE 1 TABLET BY MOUTH DAILY. (GENERIC FOR PRINIVIL) 90 tablet 1    gabapentin (NEURONTIN) 100 MG capsule Take 1 capsule by mouth 3 times daily for 90 days. Intended supply: 30 days 90 capsule 2    glipiZIDE (GLUCOTROL) 10 MG tablet Take 1 tablet by mouth 2 times daily (before meals) 180 tablet 1    aspirin 81 MG EC tablet Take 1 tablet by mouth daily 90 tablet 1    clopidogrel (PLAVIX) 75 MG tablet Take 1 tablet by mouth daily 90 tablet 1    pioglitazone (ACTOS) 30 MG tablet Take 1 tablet by mouth daily 90 tablet 1    furosemide (LASIX) 20 MG tablet Take 1 tablet by mouth daily 90 tablet 1    fluticasone (FLONASE) 50 MCG/ACT nasal spray 2 sprays by Each Nostril route daily 1 Bottle 0     No current facility-administered medications on file prior to visit. Allergies   Allergen Reactions    Penicillins Hives and Swelling    Statins Myalgia     Crestor caused bone/joint/abdominal pain          Review of Systems   Constitutional:  Negative for fatigue. Respiratory:  Negative for cough and shortness of breath. Cardiovascular:  Negative for chest pain. Gastrointestinal:  Negative for constipation and diarrhea. Objective  Vitals:    06/08/23 1334 06/08/23 1339   BP: (!) 154/86 (!) 162/90   Site: Right Upper Arm Left Upper Arm   Position: Sitting Sitting   Cuff Size: Medium Adult Medium Adult   Pulse: 87    SpO2: 98%    Weight: 277 lb (125.6 kg)    Height: 5' 10.5\" (1.791 m)      Physical Exam  Vitals and nursing note reviewed. Constitutional:       Appearance: Normal appearance. HENT:      Head: Normocephalic. Nose: Nose normal.      Mouth/Throat:      Mouth: Mucous membranes are moist.      Pharynx: Oropharynx is clear.    Eyes:      Extraocular Movements: Extraocular

## 2023-06-24 DIAGNOSIS — E11.42 TYPE 2 DIABETES MELLITUS WITH DIABETIC POLYNEUROPATHY, WITHOUT LONG-TERM CURRENT USE OF INSULIN (HCC): ICD-10-CM

## 2023-06-26 RX ORDER — GABAPENTIN 100 MG/1
CAPSULE ORAL
Qty: 90 CAPSULE | Refills: 2 | Status: SHIPPED | OUTPATIENT
Start: 2023-06-26 | End: 2023-09-24

## 2023-07-06 ENCOUNTER — OFFICE VISIT (OUTPATIENT)
Dept: FAMILY MEDICINE CLINIC | Age: 53
End: 2023-07-06

## 2023-07-06 VITALS
DIASTOLIC BLOOD PRESSURE: 80 MMHG | HEART RATE: 94 BPM | BODY MASS INDEX: 39.48 KG/M2 | HEIGHT: 71 IN | OXYGEN SATURATION: 98 % | WEIGHT: 282 LBS | SYSTOLIC BLOOD PRESSURE: 138 MMHG

## 2023-07-06 DIAGNOSIS — I10 PRIMARY HYPERTENSION: Primary | ICD-10-CM

## 2023-07-06 PROCEDURE — 99212 OFFICE O/P EST SF 10 MIN: CPT | Performed by: NURSE PRACTITIONER

## 2023-07-06 PROCEDURE — 3078F DIAST BP <80 MM HG: CPT | Performed by: NURSE PRACTITIONER

## 2023-07-06 PROCEDURE — 3075F SYST BP GE 130 - 139MM HG: CPT | Performed by: NURSE PRACTITIONER

## 2023-07-06 ASSESSMENT — ENCOUNTER SYMPTOMS
DIARRHEA: 0
COUGH: 0
CONSTIPATION: 0
SHORTNESS OF BREATH: 0

## 2023-07-06 NOTE — PROGRESS NOTES
Subjective  Chief Complaint   Patient presents with    Hypertension     4 week follow up    Health Maintenance     Denies colonoscopy        HPI    Pt here for HTN f/u. Has been checking BP at home - in the 110s-120s/ 80s. Has improved a lot. Asymptomatic. No current issues or symptoms.     Patient Active Problem List    Diagnosis Date Noted    Ataxia 02/09/2022    Cerebrovascular accident (CVA) due to stenosis of left carotid artery ([address])     Stenosis of left carotid artery     Aphasia     Neurofibroma     Acute CVA (cerebrovascular accident) ([address]) 12/31/2021    TIA (transient ischemic attack) 12/30/2021     Past Medical History:   Diagnosis Date    Cerebrovascular disease 12/29/21    Hypertension     Type 2 diabetes mellitus ([address])      Past Surgical History:   Procedure Laterality Date    VASCULAR SURGERY Right 1/4/2022    RIGHT CAROTID DIGITAL SUBTRACTION ARTERIOGRAM VIA RIGHT FEMORAL ARTERY performed by Griffin Melendez MD at [facility] History   Problem Relation Age of Onset    Rheum Arthritis Mother     Osteoarthritis Mother     Hypertension Mother     Cancer Mother         uterine cancer    Other Father         myocardial infarction    Diabetes Father      Social History     Socioeconomic History    Marital status: Single     Spouse name: None    Number of children: None    Years of education: None    Highest education level: None   Tobacco Use    Smoking status: Never    Smokeless tobacco: Never   Substance and Sexual Activity    Alcohol use: No    Drug use: No    Sexual activity: Not Currently     Partners: Female     Social Determinants of Health     Financial Resource Strain: Low Risk     Difficulty of Paying Living Expenses: Not hard at all   Food Insecurity: No Food Insecurity    Worried About Running Out of Food in the Last Year: Never true    Ran Out of Food in the Last Year: Never true     Current Outpatient Medications on File Prior to Visit   Medication Sig Dispense Refill

## 2023-07-26 DIAGNOSIS — E11.42 TYPE 2 DIABETES MELLITUS WITH DIABETIC POLYNEUROPATHY, WITHOUT LONG-TERM CURRENT USE OF INSULIN (HCC): ICD-10-CM

## 2023-07-26 RX ORDER — PRAVASTATIN SODIUM 20 MG
20 TABLET ORAL DAILY
Qty: 30 TABLET | Refills: 5 | Status: SHIPPED | OUTPATIENT
Start: 2023-07-26

## 2023-08-09 DIAGNOSIS — I10 PRIMARY HYPERTENSION: ICD-10-CM

## 2023-08-09 RX ORDER — AMLODIPINE BESYLATE 10 MG/1
10 TABLET ORAL DAILY
Qty: 30 TABLET | Refills: 5 | Status: SHIPPED | OUTPATIENT
Start: 2023-08-09

## 2023-08-09 RX ORDER — FLUTICASONE PROPIONATE 50 MCG
2 SPRAY, SUSPENSION (ML) NASAL DAILY
Qty: 1 EACH | Refills: 2 | Status: SHIPPED | OUTPATIENT
Start: 2023-08-09

## 2023-08-30 DIAGNOSIS — I63.9 ACUTE CVA (CEREBROVASCULAR ACCIDENT) (HCC): ICD-10-CM

## 2023-08-30 RX ORDER — ASPIRIN 81 MG/1
81 TABLET ORAL DAILY
Qty: 90 TABLET | Refills: 1 | Status: SHIPPED | OUTPATIENT
Start: 2023-08-30

## 2023-08-30 RX ORDER — CLOPIDOGREL BISULFATE 75 MG/1
75 TABLET ORAL DAILY
Qty: 90 TABLET | Refills: 1 | Status: SHIPPED | OUTPATIENT
Start: 2023-08-30

## 2023-08-30 NOTE — TELEPHONE ENCOUNTER
Requesting medication refill.  Please approve or deny this request.    Rx requested:  Requested Prescriptions     Pending Prescriptions Disp Refills    aspirin 81 MG EC tablet 90 tablet 1     Sig: Take 1 tablet by mouth daily    clopidogrel (PLAVIX) 75 MG tablet 90 tablet 1     Sig: Take 1 tablet by mouth daily       Last Office Visit:   7/6/2023    Next Visit Date:  Future Appointments   Date Time Provider [address]   12/6/2023  1:45 PM SALTY Inman - CNP [facility] EMERGENCY [facility] AT [place]

## 2023-09-11 DIAGNOSIS — E11.42 TYPE 2 DIABETES MELLITUS WITH DIABETIC POLYNEUROPATHY, WITHOUT LONG-TERM CURRENT USE OF INSULIN (HCC): ICD-10-CM

## 2023-09-11 RX ORDER — GLIPIZIDE 10 MG/1
10 TABLET ORAL
Qty: 180 TABLET | Refills: 1 | Status: SHIPPED | OUTPATIENT
Start: 2023-09-11

## 2023-09-13 DIAGNOSIS — R25.2 LEG CRAMPS: Primary | ICD-10-CM

## 2023-09-15 DIAGNOSIS — R25.2 LEG CRAMPS: ICD-10-CM

## 2023-09-15 DIAGNOSIS — E11.42 TYPE 2 DIABETES MELLITUS WITH DIABETIC POLYNEUROPATHY, WITHOUT LONG-TERM CURRENT USE OF INSULIN (HCC): ICD-10-CM

## 2023-09-15 LAB
ANION GAP SERPL CALCULATED.3IONS-SCNC: 12 MEQ/L (ref 9–15)
BUN SERPL-MCNC: 24 MG/DL (ref 6–20)
CALCIUM SERPL-MCNC: 9.5 MG/DL (ref 8.5–9.9)
CHLORIDE SERPL-SCNC: 103 MEQ/L (ref 95–107)
CO2 SERPL-SCNC: 26 MEQ/L (ref 20–31)
CREAT SERPL-MCNC: 0.76 MG/DL (ref 0.7–1.2)
GLUCOSE SERPL-MCNC: 106 MG/DL (ref 70–99)
POTASSIUM SERPL-SCNC: 5.4 MEQ/L (ref 3.4–4.9)
SODIUM SERPL-SCNC: 141 MEQ/L (ref 135–144)

## 2023-09-15 RX ORDER — PIOGLITAZONEHYDROCHLORIDE 30 MG/1
30 TABLET ORAL DAILY
Qty: 90 TABLET | Refills: 1 | Status: SHIPPED | OUTPATIENT
Start: 2023-09-15

## 2023-09-23 DIAGNOSIS — E11.42 TYPE 2 DIABETES MELLITUS WITH DIABETIC POLYNEUROPATHY, WITHOUT LONG-TERM CURRENT USE OF INSULIN (HCC): ICD-10-CM

## 2023-09-23 NOTE — TELEPHONE ENCOUNTER
Comments:     Last Office Visit (last PCP visit):   7/6/2023    Next Visit Date:  Future Appointments   Date Time Provider [address]   12/6/2023  1:45 PM SALTY Lara CNP [facility] AT [place]       **If hasn't been seen in over a year OR hasn't followed up according to last diabetes/ADHD visit, make appointment for patient before sending refill to provider.     Rx requested:  Requested Prescriptions     Pending Prescriptions Disp Refills    gabapentin (NEURONTIN) 100 MG capsule [Pharmacy Med Name: Gabapentin Oral Capsule 100 MG] 90 capsule 0     Sig: TAKE ONE CAPSULE BY MOUTH THREE TIMES A DAY

## 2023-09-25 RX ORDER — GABAPENTIN 100 MG/1
CAPSULE ORAL
Qty: 90 CAPSULE | Refills: 0 | Status: SHIPPED | OUTPATIENT
Start: 2023-09-25 | End: 2023-12-24

## 2023-09-28 NOTE — PROGRESS NOTES
[facility] OCCUPATIONAL THERAPY EVALUATION - ACUTE     NAME: Tracy Baez  : 1970 (45 y.o.)  MRN: [id number]  CODE STATUS: Full Code  [id number]    Date of Service: 2022    Patient Diagnosis(es): TIA (transient ischemic attack) [G45.9]  Acute CVA (cerebrovascular accident) [facility]) [I63.9]   Chief Complaint   Patient presents with    Extremity Weakness     pt c/o RUE/RLE weakness and slurred speech that started approx 1500 today     Patient Active Problem List    Diagnosis Date Noted    Cerebrovascular [facility] ([facility]A) [facility] to [facility]     Stenosis of left carotid artery     Aphasia     Neurofibroma     Acute CVA (cerebrovascular accident) ([facility] 2021    TIA (transient ischemic attack) 2021        Past Medical History:   Diagnosis Date    Hypertension     Type 2 diabetes mellitus ([facility]      Past Surgical History:   Procedure Laterality Date    VASCULAR SURGERY Right 2022    RIGHT CAROTID DIGITAL SUBTRACTION ARTERIOGRAM VIA RIGHT FEMORAL ARTERY performed by Shant Antonio MD at [facility]        Restrictions  Restrictions/Precautions: Fall Risk     Safety Devices: Safety Devices  Safety Devices in place: Yes  Type of devices: Call light within reach        Subjective  Pre Treatment Pain Screening  Pain at present: 0  Scale Used: Numeric Score  Intervention List: Patient able to continue with treatment    Pain Reassessment:   Pain Assessment  Patient Currently in Pain: Denies       Prior Level of Function:  Social/Functional History  Lives With: Significant other  Type of Home: House  Home Layout: Two level,Multi-level,Bed/Bath upstairs,1/2 bath on main level (12 steps with HR to upstairs; Pt's fiance states 3 steps down to 1/2 bath)  Home Access: Level entry  Bathroom Shower/Tub: Tub/Shower unit  [place] Toilet: Handicap height  Bathroom Equipment:  (none)  Home Equipment:  (none)  ADL Assistance: Independent  Homemaking Assistance: Independent  Homemaking Responsibilities: Yes  Ambulation Assistance: Independent  Transfer Assistance: Independent  Active : Yes  Mode of Transportation: Truck  Occupation: Full time employment  Type of occupation: Drive tow truck  Additional Comments: SO works full time; no other local family or friends reported who can help. Pt's sloan arrived mid-eval and provided additional information. She is able to take some time off from work, however has concerns with pt safety at home, especially navigating stairs. OBJECTIVE:     Orientation Status:  Orientation  Overall Orientation Status: Within Functional Limits    Observation:  Observation/Palpation  Observation: Pt pleasant and cooperative    Cognition Status:  Cognition  Overall Cognitive Status: Exceptions  Arousal/Alertness: Appropriate responses to stimuli  Following Commands: Follows all commands without difficulty  Attention Span: Appears intact  Memory: Appears intact  Safety Judgement: Decreased awareness of need for safety  Insights: Not aware of deficits  Initiation: Does not require cues  Sequencing: Does not require cues    Perception Status:  Perception  Overall Perceptual Status:  (Continue to assess)    Sensation Status:  Sensation  Overall Sensation Status: WFL    Vision and Hearing Status:  Vision  Vision: Impaired  Vision Exceptions: Wears glasses at all times  Hearing  Hearing: Within functional limits     ROM:   LUE AROM (degrees)  LUE AROM : WFL  Left Hand AROM (degrees)  Left Hand AROM: WFL  RUE AROM (degrees)  RUE AROM : WFL  Right Hand AROM (degrees)  Right Hand AROM: WFL    Strength:  LUE Strength  Gross LUE Strength: WFL  L Hand General: 5/5  LUE Strength Comment: 5/5 grossly assessed  RUE Strength  Gross RUE Strength: Exceptions to [facility] [address] [facility]  R Hand General: 4-/5  RUE Strength Comment: 4-/5 grossly assessed    Coordination, Tone, Quality of Movement:    Tone RUE  RUE Tone: Normotonic  Tone LUE  LUE Tone: sitting up    Seated and Standing Balance:  Balance  Sitting Balance: Modified independent   Standing Balance: Contact guard assistance    Functional Endurance:  Activity Tolerance  Activity Tolerance: Patient Tolerated treatment well    D/C Recommendations:  OT D/C RECOMMENDATIONS  REQUIRES OT FOLLOW UP: Yes    Equipment Recommendations:  OT Equipment Recommendations  Equipment Needed: Yes  Mobility Devices: ADL Assistive Devices  ADL Assistive Devices: Shower Chair with back,Grab Bars - shower,Grab Bars - toilet  Other: Continue to assess dressing/self-care adaptive equipment needs    OT Education:   OT Education  OT Education: [facility]  Patient Education: Educated pt and his fiance on caregiver burden and safety concerns with returning home: navigating steps, completing self-care (especially showering) with min to no equipment, mobility around the home, etc. Also discussed benefits of early, intensive, and functional intervention such as inpatient rehab to promote neuroplasticity. Pt and fiance acknowledged understanding and were encouraged to discuss and think about options. Barriers to Learning: Pt judgment and insight    OT Follow Up:  OT D/C RECOMMENDATIONS  REQUIRES OT FOLLOW UP: Yes       Assessment/Discharge Disposition:  Assessment: Pt is a 44 y/o male from home with his fiance. Pt was working full time and IND with all ADLs/IADLs PTA and has no DME/adaptive equipment at home. There are 3 steps to a half bath on the main level and 12 steps to the full bathroom on the second level. Pt demonstrates the above functional deficits impacting his ability to safely and IND'ly complete self-care, functional mobility and ADL t/fs. Pt will benefit from occupational therapy services to promote safety and IND with ADLs/IADLs.   Performance deficits / Impairments: Decreased functional mobility ,Decreased safe awareness,Decreased balance,Decreased coordination,Decreased ADL status,Decreased cognition,Decreased endurance,Decreased high-level IADLs,Decreased strength,Decreased fine motor control  Prognosis: Good  Discharge Recommendations: Continue to assess pending progress  Decision Making: Medium Complexity  History: 4 complexities  Exam: 10 performance deficits  Assistance / Modification: min A    Six Click Score   How much help for putting on and taking off regular lower body clothing?: A Little  How much help for Bathing?: A Little  How much help for Toileting?: A Little  How much help for putting on and taking off regular upper body clothing?: A Little  How much help for taking care of personal grooming?: A Little  How much help for eating meals?: None  AM-[facility] Inpatient Daily Activity Raw Score: 19  AM-[facility] Inpatient ADL T-Scale Score : 40.22  ADL Inpatient CMS 0-100% Score: 42.8    Plan:  Plan  Times per week: 1-3x  Plan weeks: acute length of stay  Current Treatment Recommendations: Loco Campbell Re-education,Patient/Caregiver Education & Training,Equipment Evaluation, Education, & procurement,Self-Care / ADL,Balance Training,Functional Mobility Training,Safety Education & Training    Goals:   Patient will:    - Improve functional endurance to tolerate/complete 30 mins of ADL's  - Be MOD I in UB ADLs   - Be MOD I in LB ADLs  - Be MOD I in ADL transfers without LOB  - Be MOD I in toileting tasks  - Access appropriate D/C site with as few architectural barriers as possible. Patient Goal: Patient goals :  \"To get back to normal.\"     Discussed and agreed upon: Yes Comments:     Therapy Time:   OT Individual Minutes  Time In: 1154  Time Out: 1110  Minutes: 22    Eval: 22 minutes     Electronically signed by:    JOHN Can, OTR/L  1/5/2022, 11:49 AM Resident

## 2023-10-10 DIAGNOSIS — E11.42 TYPE 2 DIABETES MELLITUS WITH DIABETIC POLYNEUROPATHY, WITHOUT LONG-TERM CURRENT USE OF INSULIN (HCC): ICD-10-CM

## 2023-10-10 DIAGNOSIS — I10 ESSENTIAL HYPERTENSION: ICD-10-CM

## 2023-10-10 RX ORDER — LISINOPRIL 40 MG/1
TABLET ORAL
Qty: 90 TABLET | Refills: 1 | Status: SHIPPED | OUTPATIENT
Start: 2023-10-10

## 2023-11-06 DIAGNOSIS — E11.42 TYPE 2 DIABETES MELLITUS WITH DIABETIC POLYNEUROPATHY, WITHOUT LONG-TERM CURRENT USE OF INSULIN (HCC): ICD-10-CM

## 2023-11-06 RX ORDER — PRAVASTATIN SODIUM 20 MG
20 TABLET ORAL DAILY
Qty: 90 TABLET | Refills: 1 | Status: SHIPPED | OUTPATIENT
Start: 2023-11-06

## 2023-11-06 RX ORDER — GABAPENTIN 100 MG/1
100 CAPSULE ORAL 3 TIMES DAILY
Qty: 270 CAPSULE | Refills: 0 | Status: SHIPPED | OUTPATIENT
Start: 2023-11-06 | End: 2024-02-04

## 2023-12-06 ENCOUNTER — OFFICE VISIT (OUTPATIENT)
Dept: FAMILY MEDICINE CLINIC | Age: 53
End: 2023-12-06

## 2023-12-06 VITALS
OXYGEN SATURATION: 97 % | HEART RATE: 83 BPM | BODY MASS INDEX: 40.74 KG/M2 | WEIGHT: 291 LBS | SYSTOLIC BLOOD PRESSURE: 136 MMHG | HEIGHT: 71 IN | DIASTOLIC BLOOD PRESSURE: 84 MMHG

## 2023-12-06 DIAGNOSIS — I10 PRIMARY HYPERTENSION: ICD-10-CM

## 2023-12-06 DIAGNOSIS — I10 ESSENTIAL HYPERTENSION: ICD-10-CM

## 2023-12-06 DIAGNOSIS — E11.42 TYPE 2 DIABETES MELLITUS WITH DIABETIC POLYNEUROPATHY, WITHOUT LONG-TERM CURRENT USE OF INSULIN (HCC): Primary | ICD-10-CM

## 2023-12-06 LAB — HBA1C MFR BLD: 7.1 %

## 2023-12-06 PROCEDURE — 3075F SYST BP GE 130 - 139MM HG: CPT | Performed by: NURSE PRACTITIONER

## 2023-12-06 PROCEDURE — 99213 OFFICE O/P EST LOW 20 MIN: CPT | Performed by: NURSE PRACTITIONER

## 2023-12-06 PROCEDURE — 3078F DIAST BP <80 MM HG: CPT | Performed by: NURSE PRACTITIONER

## 2023-12-06 PROCEDURE — 3051F HG A1C>EQUAL 7.0%<8.0%: CPT | Performed by: NURSE PRACTITIONER

## 2023-12-06 PROCEDURE — 83036 HEMOGLOBIN GLYCOSYLATED A1C: CPT | Performed by: NURSE PRACTITIONER

## 2023-12-06 ASSESSMENT — ENCOUNTER SYMPTOMS
SHORTNESS OF BREATH: 0
COUGH: 0
CONSTIPATION: 0
DIARRHEA: 0

## 2023-12-06 NOTE — PROGRESS NOTES
Appearance: Normal appearance. HENT:      Head: Normocephalic. Nose: Nose normal.      Mouth/Throat:      Mouth: Mucous membranes are moist.      Pharynx: Oropharynx is clear. Eyes:      Extraocular Movements: Extraocular movements intact. Conjunctiva/sclera: Conjunctivae normal.      Pupils: Pupils are equal, round, and reactive to light. Cardiovascular:      Rate and Rhythm: Normal rate and regular rhythm. Pulses: Normal pulses. Heart sounds: Normal heart sounds. Pulmonary:      Effort: Pulmonary effort is normal.      Breath sounds: Normal breath sounds. Musculoskeletal:      Cervical back: Neck supple. Skin:     General: Skin is warm. Neurological:      General: No focal deficit present. Mental Status: He is alert and oriented to person, place, and time. Mental status is at baseline. Psychiatric:         Mood and Affect: Mood normal.         Behavior: Behavior normal.         Thought Content: Thought content normal.         Judgment: Judgment normal.         Assessment & Plan     Diagnosis Orders   1. Type 2 diabetes mellitus with diabetic polyneuropathy, without long-term current use of insulin (HCC)  POCT glycosylated hemoglobin (Hb A1C)  Stable. 2. Essential hypertension  Stable. Cont with current medications      3. Primary hypertension            Orders Placed This Encounter   Procedures    POCT glycosylated hemoglobin (Hb A1C)       No orders of the defined types were placed in this encounter. There are no discontinued medications. Side effects, adverse effects of the medication prescribed today, as well as treatment plan/ rationale and result expectations have been discussed with the patient who expresses understanding and desires to proceed. Close follow up to evaluate treatment results and for coordination of care. I have reviewed the patient's medical history in detail and updated the computerized patient record.     As always, patient is advised

## 2024-02-07 ENCOUNTER — PATIENT MESSAGE (OUTPATIENT)
Dept: FAMILY MEDICINE CLINIC | Age: 54
End: 2024-02-07

## 2024-02-07 ENCOUNTER — TELEPHONE (OUTPATIENT)
Dept: FAMILY MEDICINE CLINIC | Age: 54
End: 2024-02-07

## 2024-02-07 NOTE — TELEPHONE ENCOUNTER
----- Message from Jorge Ferreira sent at 2/7/2024  4:30 PM EST -----  Regarding: Medicine Refill   Contact: [phone]  Good afternoon just wanted to let you know I don't have any refills available on any of my medications! Thank you and have a good day

## 2024-02-07 NOTE — TELEPHONE ENCOUNTER
From: Jorge Ferreira  To: Lindy Morrison  Sent: 2/7/2024 4:30 PM EST  Subject: Medicine Refill     Good afternoon just wanted to let you know I don't have any refills available on any of my medications! Thank you and have a good day

## 2024-02-08 DIAGNOSIS — E11.42 TYPE 2 DIABETES MELLITUS WITH DIABETIC POLYNEUROPATHY, WITHOUT LONG-TERM CURRENT USE OF INSULIN (HCC): ICD-10-CM

## 2024-02-08 DIAGNOSIS — I10 PRIMARY HYPERTENSION: ICD-10-CM

## 2024-02-08 RX ORDER — AMLODIPINE BESYLATE 10 MG/1
10 TABLET ORAL DAILY
Qty: 30 TABLET | Refills: 5 | Status: SHIPPED | OUTPATIENT
Start: 2024-02-08

## 2024-02-08 RX ORDER — GLIPIZIDE 10 MG/1
10 TABLET ORAL
Qty: 180 TABLET | Refills: 1 | Status: SHIPPED | OUTPATIENT
Start: 2024-02-08

## 2024-02-08 RX ORDER — PIOGLITAZONEHYDROCHLORIDE 30 MG/1
30 TABLET ORAL DAILY
Qty: 90 TABLET | Refills: 1 | Status: SHIPPED | OUTPATIENT
Start: 2024-02-08

## 2024-02-08 RX ORDER — GABAPENTIN 100 MG/1
100 CAPSULE ORAL 3 TIMES DAILY
Qty: 270 CAPSULE | Refills: 0 | Status: SHIPPED | OUTPATIENT
Start: 2024-02-08 | End: 2024-05-08

## 2024-02-08 NOTE — TELEPHONE ENCOUNTER
Last Office Visit (last PCP visit):   12/6/2023    Next Visit Date:  Future Appointments   Date Time Provider Department Center   6/6/2024  2:30 PM Lindy Morrison, APRN - CNP [facility]       **If hasn't been seen in over a year OR hasn't followed up according to last diabetes/ADHD visit, make appointment for patient before sending refill to provider.    Rx requested:  Requested Prescriptions     Pending Prescriptions Disp Refills    amLODIPine (NORVASC) 10 MG tablet 30 tablet 5     Sig: Take 1 tablet by mouth daily    gabapentin (NEURONTIN) 100 MG capsule 270 capsule 0     Sig: Take 1 capsule by mouth in the morning, at noon, and at bedtime for 90 days.

## 2024-02-08 NOTE — TELEPHONE ENCOUNTER
----- Message from Jorge Ferreira sent at 2/8/2024  3:34 PM EST -----  Regarding: Medicine Refill   Contact: [phone]  Every medication! All the bottles say no refills

## 2024-02-27 DIAGNOSIS — I63.9 ACUTE CVA (CEREBROVASCULAR ACCIDENT) (HCC): ICD-10-CM

## 2024-02-27 DIAGNOSIS — E11.42 TYPE 2 DIABETES MELLITUS WITH DIABETIC POLYNEUROPATHY, WITHOUT LONG-TERM CURRENT USE OF INSULIN (HCC): ICD-10-CM

## 2024-02-27 DIAGNOSIS — M79.89 LEG SWELLING: ICD-10-CM

## 2024-02-27 DIAGNOSIS — I10 ESSENTIAL HYPERTENSION: ICD-10-CM

## 2024-02-27 RX ORDER — FUROSEMIDE 20 MG/1
20 TABLET ORAL DAILY
Qty: 90 TABLET | Refills: 1 | Status: SHIPPED | OUTPATIENT
Start: 2024-02-27

## 2024-02-27 RX ORDER — PRAVASTATIN SODIUM 20 MG
20 TABLET ORAL DAILY
Qty: 90 TABLET | Refills: 1 | Status: SHIPPED | OUTPATIENT
Start: 2024-02-27 | End: 2024-02-28 | Stop reason: SDUPTHER

## 2024-02-27 RX ORDER — ASPIRIN 81 MG/1
81 TABLET ORAL DAILY
Qty: 90 TABLET | Refills: 1 | Status: SHIPPED | OUTPATIENT
Start: 2024-02-27

## 2024-02-27 RX ORDER — CLOPIDOGREL BISULFATE 75 MG/1
75 TABLET ORAL DAILY
Qty: 90 TABLET | Refills: 1 | Status: SHIPPED | OUTPATIENT
Start: 2024-02-27 | End: 2024-02-28 | Stop reason: SDUPTHER

## 2024-02-27 RX ORDER — LISINOPRIL 40 MG/1
TABLET ORAL
Qty: 90 TABLET | Refills: 1 | Status: SHIPPED | OUTPATIENT
Start: 2024-02-27 | End: 2024-02-28 | Stop reason: SDUPTHER

## 2024-02-28 DIAGNOSIS — E11.42 TYPE 2 DIABETES MELLITUS WITH DIABETIC POLYNEUROPATHY, WITHOUT LONG-TERM CURRENT USE OF INSULIN (HCC): ICD-10-CM

## 2024-02-28 DIAGNOSIS — I63.9 ACUTE CVA (CEREBROVASCULAR ACCIDENT) (HCC): ICD-10-CM

## 2024-02-28 DIAGNOSIS — I10 ESSENTIAL HYPERTENSION: ICD-10-CM

## 2024-02-28 RX ORDER — PRAVASTATIN SODIUM 20 MG
20 TABLET ORAL DAILY
Qty: 90 TABLET | Refills: 1 | Status: SHIPPED | OUTPATIENT
Start: 2024-02-28 | End: 2024-02-28 | Stop reason: SDUPTHER

## 2024-02-28 RX ORDER — CLOPIDOGREL BISULFATE 75 MG/1
75 TABLET ORAL DAILY
Qty: 90 TABLET | Refills: 1 | Status: SHIPPED | OUTPATIENT
Start: 2024-02-28

## 2024-02-28 RX ORDER — PRAVASTATIN SODIUM 20 MG
20 TABLET ORAL DAILY
Qty: 90 TABLET | Refills: 1 | Status: SHIPPED | OUTPATIENT
Start: 2024-02-28

## 2024-02-28 RX ORDER — LISINOPRIL 40 MG/1
TABLET ORAL
Qty: 90 TABLET | Refills: 1 | Status: SHIPPED | OUTPATIENT
Start: 2024-02-28

## 2024-03-14 DIAGNOSIS — E11.42 TYPE 2 DIABETES MELLITUS WITH DIABETIC POLYNEUROPATHY, WITHOUT LONG-TERM CURRENT USE OF INSULIN (HCC): ICD-10-CM

## 2024-03-14 RX ORDER — GLIPIZIDE 10 MG/1
10 TABLET ORAL
Qty: 180 TABLET | Refills: 1 | Status: SHIPPED | OUTPATIENT
Start: 2024-03-14

## 2024-03-14 RX ORDER — PIOGLITAZONEHYDROCHLORIDE 30 MG/1
30 TABLET ORAL DAILY
Qty: 90 TABLET | Refills: 1 | Status: SHIPPED | OUTPATIENT
Start: 2024-03-14

## 2024-03-14 NOTE — TELEPHONE ENCOUNTER
Comments: please review, thanks    Last Office Visit (last PCP visit):   12/6/2023    Next Visit Date:  Future Appointments   Date Time Provider Department Center   6/6/2024  2:30 PM Lindy Morrison APRN - CNP [facility]       **If hasn't been seen in over a year OR hasn't followed up according to last diabetes/ADHD visit, make appointment for patient before sending refill to provider.    Rx requested:  Requested Prescriptions     Pending Prescriptions Disp Refills    pioglitazone (ACTOS) 30 MG tablet 90 tablet 1     Sig: Take 1 tablet by mouth daily    glipiZIDE (GLUCOTROL) 10 MG tablet 180 tablet 1     Sig: Take 1 tablet by mouth 2 times daily (before meals)

## 2024-05-02 DIAGNOSIS — E11.42 TYPE 2 DIABETES MELLITUS WITH DIABETIC POLYNEUROPATHY, WITHOUT LONG-TERM CURRENT USE OF INSULIN (HCC): ICD-10-CM

## 2024-05-02 RX ORDER — PRAVASTATIN SODIUM 20 MG
20 TABLET ORAL DAILY
Qty: 90 TABLET | Refills: 0 | Status: SHIPPED | OUTPATIENT
Start: 2024-05-02

## 2024-05-02 NOTE — TELEPHONE ENCOUNTER
Comments:     Last Office Visit (last PCP visit):   12/6/2023    Next Visit Date:  Future Appointments   Date Time Provider Department Center   6/6/2024  2:30 PM Lindy Morrison APRN - CNP [facility]       **If hasn't been seen in over a year OR hasn't followed up according to last diabetes/ADHD visit, make appointment for patient before sending refill to provider.    Rx requested:  Requested Prescriptions     Pending Prescriptions Disp Refills    pravastatin (PRAVACHOL) 20 MG tablet [Pharmacy Med Name: Pravastatin Sodium Oral Tablet 20 MG] 90 tablet 0     Sig: TAKE ONE TABLET BY MOUTH DAILY

## 2024-05-11 DIAGNOSIS — E11.42 TYPE 2 DIABETES MELLITUS WITH DIABETIC POLYNEUROPATHY, WITHOUT LONG-TERM CURRENT USE OF INSULIN (HCC): ICD-10-CM

## 2024-05-12 RX ORDER — GABAPENTIN 100 MG/1
100 CAPSULE ORAL 3 TIMES DAILY
Qty: 270 CAPSULE | Refills: 0 | Status: SHIPPED | OUTPATIENT
Start: 2024-05-12 | End: 2024-08-10

## 2024-06-05 SDOH — ECONOMIC STABILITY: FOOD INSECURITY: WITHIN THE PAST 12 MONTHS, YOU WORRIED THAT YOUR FOOD WOULD RUN OUT BEFORE YOU GOT MONEY TO BUY MORE.: NEVER TRUE

## 2024-06-05 SDOH — ECONOMIC STABILITY: FOOD INSECURITY: WITHIN THE PAST 12 MONTHS, THE FOOD YOU BOUGHT JUST DIDN'T LAST AND YOU DIDN'T HAVE MONEY TO GET MORE.: NEVER TRUE

## 2024-06-05 SDOH — ECONOMIC STABILITY: INCOME INSECURITY: HOW HARD IS IT FOR YOU TO PAY FOR THE VERY BASICS LIKE FOOD, HOUSING, MEDICAL CARE, AND HEATING?: NOT HARD AT ALL

## 2024-06-05 ASSESSMENT — PATIENT HEALTH QUESTIONNAIRE - PHQ9
2. FEELING DOWN, DEPRESSED OR HOPELESS: NOT AT ALL
SUM OF ALL RESPONSES TO PHQ QUESTIONS 1-9: 0
2. FEELING DOWN, DEPRESSED OR HOPELESS: NOT AT ALL
SUM OF ALL RESPONSES TO PHQ QUESTIONS 1-9: 0
SUM OF ALL RESPONSES TO PHQ9 QUESTIONS 1 & 2: 0
SUM OF ALL RESPONSES TO PHQ9 QUESTIONS 1 & 2: 0
1. LITTLE INTEREST OR PLEASURE IN DOING THINGS: NOT AT ALL
SUM OF ALL RESPONSES TO PHQ QUESTIONS 1-9: 0
SUM OF ALL RESPONSES TO PHQ QUESTIONS 1-9: 0
1. LITTLE INTEREST OR PLEASURE IN DOING THINGS: NOT AT ALL

## 2024-06-06 ENCOUNTER — OFFICE VISIT (OUTPATIENT)
Dept: FAMILY MEDICINE CLINIC | Age: 54
End: 2024-06-06

## 2024-06-06 VITALS
BODY MASS INDEX: 42.95 KG/M2 | SYSTOLIC BLOOD PRESSURE: 148 MMHG | HEART RATE: 83 BPM | OXYGEN SATURATION: 98 % | TEMPERATURE: 98.1 F | WEIGHT: 300 LBS | DIASTOLIC BLOOD PRESSURE: 78 MMHG | HEIGHT: 70 IN

## 2024-06-06 DIAGNOSIS — I87.2 VENOUS STASIS DERMATITIS: ICD-10-CM

## 2024-06-06 DIAGNOSIS — E11.42 TYPE 2 DIABETES MELLITUS WITH DIABETIC POLYNEUROPATHY, WITHOUT LONG-TERM CURRENT USE OF INSULIN (HCC): Primary | ICD-10-CM

## 2024-06-06 DIAGNOSIS — I10 PRIMARY HYPERTENSION: ICD-10-CM

## 2024-06-06 PROCEDURE — 3077F SYST BP >= 140 MM HG: CPT | Performed by: NURSE PRACTITIONER

## 2024-06-06 PROCEDURE — 3078F DIAST BP <80 MM HG: CPT | Performed by: NURSE PRACTITIONER

## 2024-06-06 PROCEDURE — 99213 OFFICE O/P EST LOW 20 MIN: CPT | Performed by: NURSE PRACTITIONER

## 2024-06-06 ASSESSMENT — ENCOUNTER SYMPTOMS
DIARRHEA: 0
CONSTIPATION: 0
SHORTNESS OF BREATH: 0
COUGH: 0

## 2024-06-06 NOTE — PROGRESS NOTES
Subjective  Chief Complaint   Patient presents with    6 Month Follow-Up    Diabetes    Foot Swelling     Pt. States that his LT is a little swollen as well as bruising easily, x 1 month    Health Maintenance     Pt. States that he is self pay right now and would like to hold off on HM until a later date       HPI    BP has been running 130's/70s at home. No symptoms of high BP.     BS has been between 100-127    Taking all medication as directed.     Diet- has gained some weight over time. Wants to work on losing some weight.    Has had some lower leg swelling recently. Has varicose veins. Mild skin discoloration he is asking about.     Patient Active Problem List    Diagnosis Date Noted    Ataxia 02/09/2022    Cerebrovascular accident (CVA) due to stenosis of left carotid artery (HCC)     Stenosis of left carotid artery     Aphasia     Neurofibroma     Acute CVA (cerebrovascular accident) (HCC) 12/31/2021    TIA (transient ischemic attack) 12/30/2021     Past Medical History:   Diagnosis Date    Cerebrovascular disease 12/29/21    Hypertension     Type 2 diabetes mellitus (HCC)      Past Surgical History:   Procedure Laterality Date    VASCULAR SURGERY Right 1/4/2022    RIGHT CAROTID DIGITAL SUBTRACTION ARTERIOGRAM VIA RIGHT FEMORAL ARTERY performed by Omi Chapman MD at [facility]     Family History   Problem Relation Age of Onset    Rheum Arthritis Mother     Osteoarthritis Mother     Hypertension Mother     Cancer Mother         uterine cancer    Other Father         myocardial infarction    Diabetes Father      Social History     Socioeconomic History    Marital status: Single     Spouse name: None    Number of children: None    Years of education: None    Highest education level: None   Tobacco Use    Smoking status: Never    Smokeless tobacco: Never   Substance and Sexual Activity    Alcohol use: No    Drug use: No    Sexual activity: Not Currently     Partners: Female     Social Determinants of Health

## 2024-06-18 DIAGNOSIS — I10 PRIMARY HYPERTENSION: ICD-10-CM

## 2024-06-18 DIAGNOSIS — E11.42 TYPE 2 DIABETES MELLITUS WITH DIABETIC POLYNEUROPATHY, WITHOUT LONG-TERM CURRENT USE OF INSULIN (HCC): ICD-10-CM

## 2024-06-18 LAB
ALBUMIN SERPL-MCNC: 3.9 G/DL (ref 3.5–4.6)
ALP SERPL-CCNC: 60 U/L (ref 35–104)
ALT SERPL-CCNC: 11 U/L (ref 0–41)
ANION GAP SERPL CALCULATED.3IONS-SCNC: 11 MEQ/L (ref 9–15)
AST SERPL-CCNC: 19 U/L (ref 0–40)
BILIRUB SERPL-MCNC: <0.2 MG/DL (ref 0.2–0.7)
BUN SERPL-MCNC: 16 MG/DL (ref 6–20)
CALCIUM SERPL-MCNC: 9.7 MG/DL (ref 8.5–9.9)
CHLORIDE SERPL-SCNC: 103 MEQ/L (ref 95–107)
CHOLEST SERPL-MCNC: 128 MG/DL (ref 0–199)
CO2 SERPL-SCNC: 26 MEQ/L (ref 20–31)
CREAT SERPL-MCNC: 0.77 MG/DL (ref 0.7–1.2)
GLOBULIN SER CALC-MCNC: 3 G/DL (ref 2.3–3.5)
GLUCOSE SERPL-MCNC: 187 MG/DL (ref 70–99)
HDLC SERPL-MCNC: 39 MG/DL (ref 40–59)
LDLC SERPL CALC-MCNC: 67 MG/DL (ref 0–129)
POTASSIUM SERPL-SCNC: 4.7 MEQ/L (ref 3.4–4.9)
PROT SERPL-MCNC: 6.9 G/DL (ref 6.3–8)
SODIUM SERPL-SCNC: 140 MEQ/L (ref 135–144)
TRIGL SERPL-MCNC: 110 MG/DL (ref 0–150)

## 2024-06-19 LAB
ESTIMATED AVERAGE GLUCOSE: 174 MG/DL
HBA1C MFR BLD: 7.7 % (ref 4–6)

## 2024-07-18 ENCOUNTER — TELEPHONE (OUTPATIENT)
Dept: FAMILY MEDICINE CLINIC | Age: 54
End: 2024-07-18

## 2024-07-18 NOTE — TELEPHONE ENCOUNTER
Spoke to patient sloan, stated the colace is not working, has been taking for about a month, would like if you could prescribe something else.

## 2024-08-01 DIAGNOSIS — I10 PRIMARY HYPERTENSION: ICD-10-CM

## 2024-08-01 RX ORDER — AMLODIPINE BESYLATE 10 MG/1
10 TABLET ORAL DAILY
Qty: 30 TABLET | Refills: 5 | Status: SHIPPED | OUTPATIENT
Start: 2024-08-01

## 2024-08-01 NOTE — TELEPHONE ENCOUNTER
Comments:     Last Office Visit (last PCP visit):   6/6/2024    Next Visit Date:  Future Appointments   Date Time Provider Department Center   12/9/2024  2:45 PM Lindy Morrison APRN - CNP [facility] E[facility] DEP       **If hasn't been seen in over a year OR hasn't followed up according to last diabetes/ADHD visit, make appointment for patient before sending refill to provider.    Rx requested:  Requested Prescriptions     Pending Prescriptions Disp Refills    amLODIPine (NORVASC) 10 MG tablet [Pharmacy Med Name: amLODIPine Besylate Oral Tablet 10 MG] 30 tablet 0     Sig: TAKE ONE TABLET BY MOUTH EVERY DAY

## 2024-08-17 DIAGNOSIS — I63.9 ACUTE CVA (CEREBROVASCULAR ACCIDENT) (HCC): ICD-10-CM

## 2024-08-17 DIAGNOSIS — E11.42 TYPE 2 DIABETES MELLITUS WITH DIABETIC POLYNEUROPATHY, WITHOUT LONG-TERM CURRENT USE OF INSULIN (HCC): ICD-10-CM

## 2024-08-17 DIAGNOSIS — I10 PRIMARY HYPERTENSION: ICD-10-CM

## 2024-08-19 RX ORDER — CLOPIDOGREL BISULFATE 75 MG/1
75 TABLET ORAL DAILY
Qty: 90 TABLET | Refills: 1 | Status: SHIPPED | OUTPATIENT
Start: 2024-08-19

## 2024-08-19 RX ORDER — PRAVASTATIN SODIUM 20 MG
20 TABLET ORAL DAILY
Qty: 90 TABLET | Refills: 0 | Status: SHIPPED | OUTPATIENT
Start: 2024-08-19

## 2024-08-19 RX ORDER — AMLODIPINE BESYLATE 10 MG/1
10 TABLET ORAL DAILY
Qty: 30 TABLET | Refills: 5 | Status: SHIPPED | OUTPATIENT
Start: 2024-08-19

## 2024-08-19 RX ORDER — ASPIRIN 81 MG/1
81 TABLET ORAL DAILY
Qty: 90 TABLET | Refills: 1 | Status: SHIPPED | OUTPATIENT
Start: 2024-08-19

## 2024-08-19 NOTE — TELEPHONE ENCOUNTER
Comments: \    Last Office Visit (last PCP visit):   6/6/2024    Next Visit Date:  Future Appointments   Date Time Provider Department Center   12/9/2024  2:45 PM Lnidy Morrison APRN - CNP [facility] E[facility] DEP       **If hasn't been seen in over a year OR hasn't followed up according to last diabetes/ADHD visit, make appointment for patient before sending refill to provider.    Rx requested:  Requested Prescriptions     Pending Prescriptions Disp Refills    aspirin 81 MG EC tablet 90 tablet 1     Sig: Take 1 tablet by mouth daily    clopidogrel (PLAVIX) 75 MG tablet 90 tablet 1     Sig: Take 1 tablet by mouth daily    pravastatin (PRAVACHOL) 20 MG tablet 90 tablet 0     Sig: Take 1 tablet by mouth daily    amLODIPine (NORVASC) 10 MG tablet 30 tablet 5     Sig: Take 1 tablet by mouth daily

## 2024-09-07 DIAGNOSIS — E11.42 TYPE 2 DIABETES MELLITUS WITH DIABETIC POLYNEUROPATHY, WITHOUT LONG-TERM CURRENT USE OF INSULIN (HCC): ICD-10-CM

## 2024-09-09 RX ORDER — GLIPIZIDE 10 MG/1
10 TABLET ORAL
Qty: 180 TABLET | Refills: 1 | Status: SHIPPED | OUTPATIENT
Start: 2024-09-09

## 2024-09-28 DIAGNOSIS — E11.42 TYPE 2 DIABETES MELLITUS WITH DIABETIC POLYNEUROPATHY, WITHOUT LONG-TERM CURRENT USE OF INSULIN (HCC): ICD-10-CM

## 2024-09-28 DIAGNOSIS — I10 ESSENTIAL HYPERTENSION: ICD-10-CM

## 2024-09-30 ENCOUNTER — PATIENT MESSAGE (OUTPATIENT)
Dept: FAMILY MEDICINE CLINIC | Age: 54
End: 2024-09-30

## 2024-09-30 DIAGNOSIS — I10 ESSENTIAL HYPERTENSION: ICD-10-CM

## 2024-09-30 RX ORDER — GABAPENTIN 100 MG/1
100 CAPSULE ORAL 3 TIMES DAILY
Qty: 270 CAPSULE | Refills: 0 | Status: SHIPPED | OUTPATIENT
Start: 2024-09-30 | End: 2024-12-29

## 2024-09-30 RX ORDER — LISINOPRIL 40 MG/1
TABLET ORAL
Qty: 90 TABLET | Refills: 1 | Status: SHIPPED | OUTPATIENT
Start: 2024-09-30

## 2024-09-30 RX ORDER — LISINOPRIL 40 MG/1
TABLET ORAL
Qty: 90 TABLET | Refills: 0 | OUTPATIENT
Start: 2024-09-30

## 2024-09-30 NOTE — TELEPHONE ENCOUNTER
Comments:     Last Office Visit (last PCP visit):   6/6/2024    Next Visit Date:  Future Appointments   Date Time Provider Department Center   12/9/2024  2:45 PM Lindy Morrison APRN - CNP [facility] ECC DEP       **If hasn't been seen in over a year OR hasn't followed up according to last diabetes/ADHD visit, make appointment for patient before sending refill to provider.    Rx requested:  Requested Prescriptions     Pending Prescriptions Disp Refills    lisinopril (PRINIVIL;ZESTRIL) 40 MG tablet 90 tablet 1     Sig: TAKE 1 TABLET BY MOUTH DAILY. (GENERIC FOR PRINIVIL)    gabapentin (NEURONTIN) 100 MG capsule 270 capsule 0     Sig: Take 1 capsule by mouth in the morning, at noon, and at bedtime for 90 days.

## 2024-11-12 DIAGNOSIS — E11.42 TYPE 2 DIABETES MELLITUS WITH DIABETIC POLYNEUROPATHY, WITHOUT LONG-TERM CURRENT USE OF INSULIN (HCC): ICD-10-CM

## 2024-11-17 DIAGNOSIS — E11.42 TYPE 2 DIABETES MELLITUS WITH DIABETIC POLYNEUROPATHY, WITHOUT LONG-TERM CURRENT USE OF INSULIN (HCC): ICD-10-CM

## 2024-11-18 DIAGNOSIS — E11.42 TYPE 2 DIABETES MELLITUS WITH DIABETIC POLYNEUROPATHY, WITHOUT LONG-TERM CURRENT USE OF INSULIN (HCC): ICD-10-CM

## 2024-11-18 RX ORDER — PRAVASTATIN SODIUM 20 MG
20 TABLET ORAL DAILY
Qty: 90 TABLET | Refills: 0 | OUTPATIENT
Start: 2024-11-18

## 2024-11-18 RX ORDER — PRAVASTATIN SODIUM 20 MG
20 TABLET ORAL DAILY
Qty: 90 TABLET | Refills: 0 | Status: SHIPPED | OUTPATIENT
Start: 2024-11-18

## 2025-01-06 DIAGNOSIS — E11.42 TYPE 2 DIABETES MELLITUS WITH DIABETIC POLYNEUROPATHY, WITHOUT LONG-TERM CURRENT USE OF INSULIN (HCC): ICD-10-CM

## 2025-01-06 RX ORDER — PIOGLITAZONE 30 MG/1
30 TABLET ORAL DAILY
Qty: 90 TABLET | Refills: 1 | Status: SHIPPED | OUTPATIENT
Start: 2025-01-06

## 2025-01-06 NOTE — TELEPHONE ENCOUNTER
Comments:     Last Office Visit (last PCP visit):   6/6/2024    Next Visit Date:  Future Appointments   Date Time Provider Department Center   1/14/2025  2:30 PM Lindy Morrison APRN - CNP [facility] ECC DEP       **If hasn't been seen in over a year OR hasn't followed up according to last diabetes/ADHD visit, make appointment for patient before sending refill to provider.    Rx requested:  Requested Prescriptions     Pending Prescriptions Disp Refills    pioglitazone (ACTOS) 30 MG tablet 90 tablet 1     Sig: Take 1 tablet by mouth daily

## 2025-01-10 DIAGNOSIS — M79.89 LEG SWELLING: ICD-10-CM

## 2025-01-10 RX ORDER — FUROSEMIDE 20 MG/1
20 TABLET ORAL DAILY
Qty: 90 TABLET | Refills: 0 | Status: SHIPPED | OUTPATIENT
Start: 2025-01-10

## 2025-01-10 NOTE — TELEPHONE ENCOUNTER
Comments:     Last Office Visit (last PCP visit):   6/6/2024    Next Visit Date:  Future Appointments   Date Time Provider Department Center   1/14/2025  2:30 PM Lindy Morrison APRN - CNP [facility] DEP       **If hasn't been seen in over a year OR hasn't followed up according to last diabetes/ADHD visit, make appointment for patient before sending refill to provider.    Rx requested:  Requested Prescriptions     Pending Prescriptions Disp Refills    furosemide (LASIX) 20 MG tablet [Pharmacy Med Name: Furosemide Oral Tablet 20 MG] 90 tablet 0     Sig: TAKE ONE TABLET BY MOUTH DAILY

## 2025-01-13 SDOH — ECONOMIC STABILITY: FOOD INSECURITY: WITHIN THE PAST 12 MONTHS, YOU WORRIED THAT YOUR FOOD WOULD RUN OUT BEFORE YOU GOT MONEY TO BUY MORE.: NEVER TRUE

## 2025-01-13 SDOH — ECONOMIC STABILITY: FOOD INSECURITY: WITHIN THE PAST 12 MONTHS, THE FOOD YOU BOUGHT JUST DIDN'T LAST AND YOU DIDN'T HAVE MONEY TO GET MORE.: NEVER TRUE

## 2025-01-13 SDOH — ECONOMIC STABILITY: INCOME INSECURITY: IN THE LAST 12 MONTHS, WAS THERE A TIME WHEN YOU WERE NOT ABLE TO PAY THE MORTGAGE OR RENT ON TIME?: NO

## 2025-01-13 ASSESSMENT — PATIENT HEALTH QUESTIONNAIRE - PHQ9
2. FEELING DOWN, DEPRESSED OR HOPELESS: NOT AT ALL
1. LITTLE INTEREST OR PLEASURE IN DOING THINGS: NOT AT ALL
SUM OF ALL RESPONSES TO PHQ QUESTIONS 1-9: 0
SUM OF ALL RESPONSES TO PHQ9 QUESTIONS 1 & 2: 0
SUM OF ALL RESPONSES TO PHQ9 QUESTIONS 1 & 2: 0
2. FEELING DOWN, DEPRESSED OR HOPELESS: NOT AT ALL
SUM OF ALL RESPONSES TO PHQ QUESTIONS 1-9: 0
1. LITTLE INTEREST OR PLEASURE IN DOING THINGS: NOT AT ALL
SUM OF ALL RESPONSES TO PHQ QUESTIONS 1-9: 0
SUM OF ALL RESPONSES TO PHQ QUESTIONS 1-9: 0

## 2025-01-14 ENCOUNTER — OFFICE VISIT (OUTPATIENT)
Dept: FAMILY MEDICINE CLINIC | Age: 55
End: 2025-01-14

## 2025-01-14 VITALS
WEIGHT: 315 LBS | BODY MASS INDEX: 45.1 KG/M2 | DIASTOLIC BLOOD PRESSURE: 88 MMHG | TEMPERATURE: 97.1 F | HEIGHT: 70 IN | HEART RATE: 91 BPM | SYSTOLIC BLOOD PRESSURE: 160 MMHG | OXYGEN SATURATION: 98 %

## 2025-01-14 DIAGNOSIS — I10 ESSENTIAL HYPERTENSION: Primary | ICD-10-CM

## 2025-01-14 DIAGNOSIS — E11.42 TYPE 2 DIABETES MELLITUS WITH DIABETIC POLYNEUROPATHY, WITHOUT LONG-TERM CURRENT USE OF INSULIN (HCC): ICD-10-CM

## 2025-01-14 DIAGNOSIS — Z86.73 HISTORY OF CVA (CEREBROVASCULAR ACCIDENT): ICD-10-CM

## 2025-01-14 PROBLEM — I63.9 ACUTE CVA (CEREBROVASCULAR ACCIDENT) (HCC): Status: RESOLVED | Noted: 2021-12-31 | Resolved: 2025-01-14

## 2025-01-14 PROCEDURE — 99213 OFFICE O/P EST LOW 20 MIN: CPT | Performed by: NURSE PRACTITIONER

## 2025-01-14 PROCEDURE — 3077F SYST BP >= 140 MM HG: CPT | Performed by: NURSE PRACTITIONER

## 2025-01-14 PROCEDURE — 3079F DIAST BP 80-89 MM HG: CPT | Performed by: NURSE PRACTITIONER

## 2025-01-14 RX ORDER — LISINOPRIL AND HYDROCHLOROTHIAZIDE 12.5; 2 MG/1; MG/1
2 TABLET ORAL DAILY
Qty: 60 TABLET | Refills: 5 | Status: SHIPPED | OUTPATIENT
Start: 2025-01-14

## 2025-01-14 ASSESSMENT — ENCOUNTER SYMPTOMS
COUGH: 0
DIARRHEA: 0
CONSTIPATION: 0
SHORTNESS OF BREATH: 0

## 2025-01-14 NOTE — PROGRESS NOTES
Subjective  Chief Complaint   Patient presents with    6 Month Follow-Up     No concerns    Diabetes     No concerns    Health Maintenance     Declined Colonoscopy       HPI    History of Present Illness  The patient is a 54-year-old male who presents for evaluation of hypertension and diabetes.    He reports a persistent elevation in his blood pressure, which he monitors intermittently at home. The readings typically range from 140 to 142 systolic and 78 to 80 diastolic. He has been making dietary modifications, including reducing his salt intake. His current antihypertensive regimen includes lisinopril 40 mg and amlodipine 10 mg. He also takes Lasix as needed and reports no significant fluid retention.    His blood glucose levels have been relatively stable, with an average reading of 132 to 134. A recent check, conducted approximately an hour prior to the visit, yielded a result of 137.    MEDICATIONS  Current: Lisinopril, amlodipine, Lasix    Patient Active Problem List    Diagnosis Date Noted    History of CVA (cerebrovascular accident) 01/14/2025    Essential hypertension 01/14/2025    Type 2 diabetes mellitus with diabetic polyneuropathy, without long-term current use of insulin (HCC) 01/14/2025    Ataxia 02/09/2022    Stenosis of left carotid artery     Aphasia     Neurofibroma     TIA (transient ischemic attack) 12/30/2021     Past Medical History:   Diagnosis Date    Acute CVA (cerebrovascular accident) ([facility]) 12/31/2021    Cerebrovascular accident (CVA) due to stenosis of left carotid artery ([facility])     Cerebrovascular disease 12/29/21    Hypertension     Type 2 diabetes mellitus ([facility])      Past Surgical History:   Procedure Laterality Date    VASCULAR SURGERY Right 1/4/2022    RIGHT CAROTID DIGITAL SUBTRACTION ARTERIOGRAM VIA RIGHT FEMORAL ARTERY performed by Omi Chapman MD at [facility] OR     Family History   Problem Relation Age of Onset    Rheum Arthritis Mother     Osteoarthritis Mother     Hypertension

## 2025-02-11 DIAGNOSIS — I10 ESSENTIAL HYPERTENSION: ICD-10-CM

## 2025-02-11 DIAGNOSIS — E11.42 TYPE 2 DIABETES MELLITUS WITH DIABETIC POLYNEUROPATHY, WITHOUT LONG-TERM CURRENT USE OF INSULIN (HCC): ICD-10-CM

## 2025-02-12 RX ORDER — PRAVASTATIN SODIUM 20 MG
20 TABLET ORAL DAILY
Qty: 30 TABLET | Refills: 5 | Status: SHIPPED | OUTPATIENT
Start: 2025-02-12

## 2025-02-12 RX ORDER — LISINOPRIL AND HYDROCHLOROTHIAZIDE 12.5; 2 MG/1; MG/1
2 TABLET ORAL DAILY
Qty: 60 TABLET | Refills: 5 | Status: SHIPPED | OUTPATIENT
Start: 2025-02-12

## 2025-02-12 NOTE — TELEPHONE ENCOUNTER
Comments:     Last Office Visit (last PCP visit):   1/14/2025    Next Visit Date:  Future Appointments   Date Time Provider Department Center   7/14/2025  2:30 PM Lindy Morrison APRN - CNP [facility] DEP       **If hasn't been seen in over a year OR hasn't followed up according to last diabetes/ADHD visit, make appointment for patient before sending refill to provider.    Rx requested:  Requested Prescriptions     Pending Prescriptions Disp Refills    pravastatin (PRAVACHOL) 20 MG tablet 90 tablet 0     Sig: Take 1 tablet by mouth daily    lisinopril-hydroCHLOROthiazide (PRINZIDE;ZESTORETIC) 20-12.5 MG per tablet 60 tablet 5     Sig: Take 2 tablets by mouth daily

## 2025-02-14 DIAGNOSIS — E11.42 TYPE 2 DIABETES MELLITUS WITH DIABETIC POLYNEUROPATHY, WITHOUT LONG-TERM CURRENT USE OF INSULIN (HCC): ICD-10-CM

## 2025-02-14 NOTE — TELEPHONE ENCOUNTER
Comments:     Last Office Visit (last PCP visit):   1/14/2025    Next Visit Date:  Future Appointments   Date Time Provider Department Center   7/14/2025  2:30 PM Lindy Morrison APRN - CNP [facility] DEP       **If hasn't been seen in over a year OR hasn't followed up according to last diabetes/ADHD visit, make appointment for patient before sending refill to provider.    Rx requested:  Requested Prescriptions     Pending Prescriptions Disp Refills    metFORMIN (GLUCOPHAGE) 500 MG tablet [Pharmacy Med Name: metFORMIN HCl Oral Tablet 500 MG] 360 tablet 0     Sig: TAKE TWO TABLETS BY MOUTH TWO TIMES A DAY WITH MEALS

## 2025-02-17 DIAGNOSIS — I63.9 ACUTE CVA (CEREBROVASCULAR ACCIDENT) (HCC): ICD-10-CM

## 2025-02-17 RX ORDER — ASPIRIN 81 MG/1
81 TABLET ORAL DAILY
Qty: 90 TABLET | Refills: 1 | Status: SHIPPED | OUTPATIENT
Start: 2025-02-17

## 2025-02-17 RX ORDER — CLOPIDOGREL BISULFATE 75 MG/1
75 TABLET ORAL DAILY
Qty: 90 TABLET | Refills: 1 | Status: SHIPPED | OUTPATIENT
Start: 2025-02-17

## 2025-02-17 NOTE — TELEPHONE ENCOUNTER
Comments:     Last Office Visit (last PCP visit):   1/14/2025    Next Visit Date:  Future Appointments   Date Time Provider Department Center   7/14/2025  2:30 PM Lindy Morrison APRN - CNP [facility] DEP       **If hasn't been seen in over a year OR hasn't followed up according to last diabetes/ADHD visit, make appointment for patient before sending refill to provider.    Rx requested:  Requested Prescriptions     Pending Prescriptions Disp Refills    aspirin 81 MG EC tablet 90 tablet 1     Sig: Take 1 tablet by mouth daily    clopidogrel (PLAVIX) 75 MG tablet 90 tablet 1     Sig: Take 1 tablet by mouth daily

## 2025-03-03 DIAGNOSIS — I10 PRIMARY HYPERTENSION: ICD-10-CM

## 2025-03-03 DIAGNOSIS — E11.42 TYPE 2 DIABETES MELLITUS WITH DIABETIC POLYNEUROPATHY, WITHOUT LONG-TERM CURRENT USE OF INSULIN (HCC): ICD-10-CM

## 2025-03-04 RX ORDER — AMLODIPINE BESYLATE 10 MG/1
10 TABLET ORAL DAILY
Qty: 30 TABLET | Refills: 5 | Status: SHIPPED | OUTPATIENT
Start: 2025-03-04

## 2025-03-04 RX ORDER — PIOGLITAZONE 30 MG/1
30 TABLET ORAL DAILY
Qty: 90 TABLET | Refills: 5 | Status: SHIPPED | OUTPATIENT
Start: 2025-03-04

## 2025-03-04 RX ORDER — GLIPIZIDE 10 MG/1
10 TABLET ORAL
Qty: 180 TABLET | Refills: 5 | Status: SHIPPED | OUTPATIENT
Start: 2025-03-04

## 2025-03-04 NOTE — TELEPHONE ENCOUNTER
Comments:     Last Office Visit (last PCP visit):   1/14/2025    Next Visit Date:  Future Appointments   Date Time Provider Department Center   7/14/2025  2:30 PM Lindy Morrison APRN - CNP [facility] DE[facility]       **If hasn't been seen in over a year OR hasn't followed up according to last diabetes/ADHD visit, make appointment for patient before sending refill to provider.    Rx requested:  Requested Prescriptions     Pending Prescriptions Disp Refills    amLODIPine (NORVASC) 10 MG tablet 30 tablet 5     Sig: Take 1 tablet by mouth daily    glipiZIDE (GLUCOTROL) 10 MG tablet 180 tablet 1     Sig: Take 1 tablet by mouth 2 times daily (before meals)    pioglitazone (ACTOS) 30 MG tablet 90 tablet 1     Sig: Take 1 tablet by mouth daily

## 2025-06-22 DIAGNOSIS — E11.42 TYPE 2 DIABETES MELLITUS WITH DIABETIC POLYNEUROPATHY, WITHOUT LONG-TERM CURRENT USE OF INSULIN (HCC): ICD-10-CM

## 2025-06-23 NOTE — TELEPHONE ENCOUNTER
Comments:     Last Office Visit (last PCP visit):   1/14/2025    Next Visit Date:  Future Appointments   Date Time Provider Department Center   7/14/2025  2:30 PM Lindy Morrison APRN - CNP [facility] DEP       **If hasn't been seen in over a year OR hasn't followed up according to last diabetes/ADHD visit, make appointment for patient before sending refill to provider.    Rx requested:  Requested Prescriptions     Pending Prescriptions Disp Refills    metFORMIN (GLUCOPHAGE) 500 MG tablet 360 tablet 0     Sig: TAKE TWO TABLETS BY MOUTH TWO TIMES A DAY WITH MEALS

## 2025-07-07 ENCOUNTER — PATIENT MESSAGE (OUTPATIENT)
Dept: FAMILY MEDICINE CLINIC | Age: 55
End: 2025-07-07

## 2025-07-07 DIAGNOSIS — E11.42 TYPE 2 DIABETES MELLITUS WITH DIABETIC POLYNEUROPATHY, WITHOUT LONG-TERM CURRENT USE OF INSULIN (HCC): Primary | ICD-10-CM

## 2025-07-07 DIAGNOSIS — I10 PRIMARY HYPERTENSION: ICD-10-CM

## 2025-07-09 DIAGNOSIS — E11.42 TYPE 2 DIABETES MELLITUS WITH DIABETIC POLYNEUROPATHY, WITHOUT LONG-TERM CURRENT USE OF INSULIN (HCC): ICD-10-CM

## 2025-07-09 DIAGNOSIS — I10 PRIMARY HYPERTENSION: ICD-10-CM

## 2025-07-09 LAB
ALBUMIN SERPL-MCNC: 4.3 G/DL (ref 3.5–4.6)
ALP SERPL-CCNC: 59 U/L (ref 35–104)
ALT SERPL-CCNC: 11 U/L (ref 0–41)
ANION GAP SERPL CALCULATED.3IONS-SCNC: 11 MEQ/L (ref 9–15)
AST SERPL-CCNC: 18 U/L (ref 0–40)
BILIRUB SERPL-MCNC: <0.2 MG/DL (ref 0.2–0.7)
BUN SERPL-MCNC: 22 MG/DL (ref 6–20)
CALCIUM SERPL-MCNC: 9.5 MG/DL (ref 8.5–9.9)
CHLORIDE SERPL-SCNC: 104 MEQ/L (ref 95–107)
CHOLEST SERPL-MCNC: 136 MG/DL (ref 0–199)
CO2 SERPL-SCNC: 25 MEQ/L (ref 20–31)
CREAT SERPL-MCNC: 0.81 MG/DL (ref 0.7–1.2)
GLOBULIN SER CALC-MCNC: 3.4 G/DL (ref 2.3–3.5)
GLUCOSE SERPL-MCNC: 152 MG/DL (ref 70–99)
HDLC SERPL-MCNC: 33 MG/DL (ref 40–59)
LDLC SERPL CALC-MCNC: 86 MG/DL (ref 0–129)
POTASSIUM SERPL-SCNC: 5 MEQ/L (ref 3.4–4.9)
PROT SERPL-MCNC: 7.7 G/DL (ref 6.3–8)
SODIUM SERPL-SCNC: 140 MEQ/L (ref 135–144)
TRIGL SERPL-MCNC: 85 MG/DL (ref 0–150)

## 2025-07-10 LAB
ESTIMATED AVERAGE GLUCOSE: 154 MG/DL
HBA1C MFR BLD: 7 % (ref 4–6)

## 2025-07-14 ENCOUNTER — OFFICE VISIT (OUTPATIENT)
Dept: FAMILY MEDICINE CLINIC | Age: 55
End: 2025-07-14

## 2025-07-14 VITALS
BODY MASS INDEX: 44.87 KG/M2 | OXYGEN SATURATION: 96 % | SYSTOLIC BLOOD PRESSURE: 138 MMHG | HEART RATE: 98 BPM | HEIGHT: 70 IN | WEIGHT: 313.4 LBS | DIASTOLIC BLOOD PRESSURE: 78 MMHG

## 2025-07-14 DIAGNOSIS — I10 ESSENTIAL HYPERTENSION: ICD-10-CM

## 2025-07-14 DIAGNOSIS — R25.2 LEG CRAMPS: ICD-10-CM

## 2025-07-14 DIAGNOSIS — M79.10 MYALGIA: Primary | ICD-10-CM

## 2025-07-14 PROCEDURE — 99213 OFFICE O/P EST LOW 20 MIN: CPT | Performed by: NURSE PRACTITIONER

## 2025-07-14 PROCEDURE — 3075F SYST BP GE 130 - 139MM HG: CPT | Performed by: NURSE PRACTITIONER

## 2025-07-14 PROCEDURE — 3078F DIAST BP <80 MM HG: CPT | Performed by: NURSE PRACTITIONER

## 2025-07-14 RX ORDER — LISINOPRIL AND HYDROCHLOROTHIAZIDE 12.5; 2 MG/1; MG/1
2 TABLET ORAL DAILY
Qty: 180 TABLET | Refills: 1 | Status: SHIPPED | OUTPATIENT
Start: 2025-07-14

## 2025-07-14 ASSESSMENT — ENCOUNTER SYMPTOMS
COUGH: 0
SHORTNESS OF BREATH: 0

## 2025-07-14 NOTE — PROGRESS NOTES
Subjective  Chief Complaint   Patient presents with    6 Month Follow-Up     Does have a medication concern    Hypertension     No Concerns    Medication Refill     Refill, Pended, would like a 90 day supply rather than a 30 day    Discuss Medications     Would like to discuss getting off the pravastatin, causing leg cramps, feeling very fatigued    Health Maintenance     Declined [facility]    History of Present Illness  The patient is a 54-year-old male who presents for evaluation of leg cramps, balance issues, and medication management.    He reports experiencing leg cramps at night, which he manages by walking. He has been off gabapentin for about 3 to 4 months and experiences constant fatigue, often falling asleep when seated. He also reports a lack of energy. He occasionally experiences leg pain during the day, which he describes as cramping rather than neuropathy. His leg pain intensifies with increased activity such as walking. He also mentions that his feet sometimes appear slightly purple. He has discontinued the use of gabapentin and Lasix. He takes CoQ10 daily as advised.    It is reported that he has been having difficulty walking recently, particularly when he does not have support to hold onto. He does not believe this is due to balance issues. He uses a cane for support during grocery shopping.    He requests a refill of his Prinzide (lisinopril/hydrochlorothiazide) prescription.    He has a history of stroke, which he believes was caused by clogged carotid arteries.    Social History:  Coffee/Tea/Caffeine-containing Drinks: The patient drinks Gatorade Zero.  Sleep: The patient reports constant fatigue and often falls asleep when seated.    Patient Active Problem List    Diagnosis Date Noted    History of CVA (cerebrovascular accident) 01/14/2025    Essential hypertension 01/14/2025    Type 2 diabetes mellitus with diabetic polyneuropathy, without long-term current use of insulin (HCC) 01/14/2025

## 2025-08-08 DIAGNOSIS — I63.9 ACUTE CVA (CEREBROVASCULAR ACCIDENT) (HCC): ICD-10-CM

## 2025-08-08 RX ORDER — CLOPIDOGREL BISULFATE 75 MG/1
75 TABLET ORAL DAILY
Qty: 90 TABLET | Refills: 1 | Status: SHIPPED | OUTPATIENT
Start: 2025-08-08

## 2025-08-08 RX ORDER — ASPIRIN 81 MG/1
81 TABLET ORAL DAILY
Qty: 90 TABLET | Refills: 1 | Status: SHIPPED | OUTPATIENT
Start: 2025-08-08

## 2025-08-28 ENCOUNTER — PATIENT MESSAGE (OUTPATIENT)
Dept: FAMILY MEDICINE CLINIC | Age: 55
End: 2025-08-28

## 2025-08-28 DIAGNOSIS — M79.89 LEG SWELLING: ICD-10-CM

## 2025-08-29 RX ORDER — FUROSEMIDE 20 MG/1
20 TABLET ORAL DAILY
Qty: 90 TABLET | Refills: 0 | Status: SHIPPED | OUTPATIENT
Start: 2025-08-29

## 2025-09-02 DIAGNOSIS — I10 PRIMARY HYPERTENSION: ICD-10-CM

## 2025-09-02 RX ORDER — AMLODIPINE BESYLATE 10 MG/1
10 TABLET ORAL DAILY
Qty: 30 TABLET | Refills: 5 | Status: SHIPPED | OUTPATIENT
Start: 2025-09-02

## (undated) DEVICE — PACK,LAPAROTOMY,NO GOWNS: Brand: MEDLINE

## (undated) DEVICE — NEEDLE ANGIO COURNAND THN 3 PART 18GAX5.1CM MAJESTIK

## (undated) DEVICE — RADIFOCUS GLIDECATH: Brand: GLIDECATH

## (undated) DEVICE — Device: Brand: MEDEX

## (undated) DEVICE — GLOVE SURG SZ 85 L12IN FNGR THK94MIL TRNSLUC YEL LTX

## (undated) DEVICE — Z DISCONTINUED PER MEDLINE DRESSING ALG W9XL10CM SIL CVR WTRPRF VIR BACT BARR ANTIMIC

## (undated) DEVICE — C-ARM: Brand: UNBRANDED

## (undated) DEVICE — INTENDED FOR TISSUE SEPARATION, AND OTHER PROCEDURES THAT REQUIRE A SHARP SURGICAL BLADE TO PUNCTURE OR CUT.: Brand: BARD-PARKER ®  SAFETY SCALPED

## (undated) DEVICE — SYRINGE MED 10ML LUERLOCK TIP W/O SFTY DISP

## (undated) DEVICE — LABEL MED MINI W/ MARKER

## (undated) DEVICE — DECANTER FLD 9IN ST BG FOR ASEP TRNSF OF FLD

## (undated) DEVICE — ANGIO-SEAL EVOLUTION VASCULAR CLOSURE DEVICE: Brand: ANGIO-SEAL

## (undated) DEVICE — CATHETER DIAG AD 5FR L100CM COR GRY POLYUR SIM2 W/O SIDE H

## (undated) DEVICE — TOWEL,OR,DSP,ST,BLUE,STD,4/PK,20PK/CS: Brand: MEDLINE

## (undated) DEVICE — NEEDLE HYPO 25GA L1.5IN BLU POLYPR HUB S STL REG BVL STR

## (undated) DEVICE — SYRINGE MED 30ML STD CLR PLAS LUERLOCK TIP N CTRL DISP

## (undated) DEVICE — MICROPUNCTURE INTRODUCER SET SILHOUETTE TRANSITIONLESS WITH NITINOL WIRE GUIDE: Brand: MICROPUNCTURE

## (undated) DEVICE — GAUZE,SPONGE,4"X4",16PLY,XRAY,STRL,LF: Brand: MEDLINE

## (undated) DEVICE — APPLICATOR MEDICATED 10.5 CC SOLUTION HI LT ORNG CHLORAPREP

## (undated) DEVICE — SHEATH INTRO 5FR L11CM HEMSTAT VLV DIL SIDE PRT RADPQ W/O

## (undated) DEVICE — CATHETER ANGIO L100CM OD5FR 0.035IN POLYUR H1 CEREB BRAID

## (undated) DEVICE — GLOW 'N TELL 30CM TAPE (50 STRIPS): Brand: VASCUTAPE RADIOPAQUE TAPE

## (undated) DEVICE — ADHESIVE SKIN CLSR 0.7ML TOP DERMBND ADV

## (undated) DEVICE — SPONGE,LAP,18"X18",DLX,XR,ST,5/PK,40/PK: Brand: MEDLINE